# Patient Record
Sex: FEMALE | Race: OTHER | NOT HISPANIC OR LATINO
[De-identification: names, ages, dates, MRNs, and addresses within clinical notes are randomized per-mention and may not be internally consistent; named-entity substitution may affect disease eponyms.]

---

## 2017-01-13 ENCOUNTER — RX RENEWAL (OUTPATIENT)
Age: 42
End: 2017-01-13

## 2017-01-18 ENCOUNTER — TRANSCRIPTION ENCOUNTER (OUTPATIENT)
Age: 42
End: 2017-01-18

## 2017-01-19 ENCOUNTER — OTHER (OUTPATIENT)
Age: 42
End: 2017-01-19

## 2017-01-20 ENCOUNTER — OTHER (OUTPATIENT)
Age: 42
End: 2017-01-20

## 2017-01-23 ENCOUNTER — RX RENEWAL (OUTPATIENT)
Age: 42
End: 2017-01-23

## 2017-01-24 ENCOUNTER — APPOINTMENT (OUTPATIENT)
Dept: MAMMOGRAPHY | Facility: CLINIC | Age: 42
End: 2017-01-24

## 2017-02-21 ENCOUNTER — OUTPATIENT (OUTPATIENT)
Dept: OUTPATIENT SERVICES | Facility: HOSPITAL | Age: 42
LOS: 1 days | End: 2017-02-21
Payer: MEDICAID

## 2017-02-21 ENCOUNTER — APPOINTMENT (OUTPATIENT)
Dept: MAMMOGRAPHY | Facility: CLINIC | Age: 42
End: 2017-02-21

## 2017-02-21 DIAGNOSIS — Z00.8 ENCOUNTER FOR OTHER GENERAL EXAMINATION: ICD-10-CM

## 2017-02-21 PROCEDURE — 77067 SCR MAMMO BI INCL CAD: CPT

## 2017-02-21 PROCEDURE — 77063 BREAST TOMOSYNTHESIS BI: CPT

## 2017-03-27 ENCOUNTER — RX RENEWAL (OUTPATIENT)
Age: 42
End: 2017-03-27

## 2017-05-01 ENCOUNTER — TRANSCRIPTION ENCOUNTER (OUTPATIENT)
Age: 42
End: 2017-05-01

## 2017-05-15 ENCOUNTER — LABORATORY RESULT (OUTPATIENT)
Age: 42
End: 2017-05-15

## 2017-05-15 ENCOUNTER — APPOINTMENT (OUTPATIENT)
Dept: INTERNAL MEDICINE | Facility: CLINIC | Age: 42
End: 2017-05-15

## 2017-05-15 ENCOUNTER — NON-APPOINTMENT (OUTPATIENT)
Age: 42
End: 2017-05-15

## 2017-05-15 VITALS
WEIGHT: 290 LBS | SYSTOLIC BLOOD PRESSURE: 140 MMHG | BODY MASS INDEX: 51.38 KG/M2 | TEMPERATURE: 97.6 F | HEIGHT: 63 IN | OXYGEN SATURATION: 96 % | HEART RATE: 84 BPM | RESPIRATION RATE: 17 BRPM | DIASTOLIC BLOOD PRESSURE: 90 MMHG

## 2017-05-16 LAB
25(OH)D3 SERPL-MCNC: 22.1 NG/ML
ALBUMIN SERPL ELPH-MCNC: 4.2 G/DL
ALP BLD-CCNC: 77 U/L
ALT SERPL-CCNC: 27 U/L
ANION GAP SERPL CALC-SCNC: 15 MMOL/L
AST SERPL-CCNC: 22 U/L
BASOPHILS # BLD AUTO: 0.07 K/UL
BASOPHILS NFR BLD AUTO: 0.9 %
BILIRUB SERPL-MCNC: <0.2 MG/DL
BUN SERPL-MCNC: 13 MG/DL
CALCIUM SERPL-MCNC: 9.5 MG/DL
CHLORIDE SERPL-SCNC: 101 MMOL/L
CHOLEST SERPL-MCNC: 151 MG/DL
CHOLEST/HDLC SERPL: 2 RATIO
CO2 SERPL-SCNC: 26 MMOL/L
CREAT SERPL-MCNC: 0.58 MG/DL
EOSINOPHIL # BLD AUTO: 0.33 K/UL
EOSINOPHIL NFR BLD AUTO: 4.5 %
FERRITIN SERPL-MCNC: 6 NG/ML
GLUCOSE SERPL-MCNC: 90 MG/DL
HBA1C MFR BLD HPLC: 5.9 %
HCT VFR BLD CALC: 38.1 %
HDLC SERPL-MCNC: 87 MG/DL
HGB BLD-MCNC: 11.3 G/DL
HIV1+2 AB SPEC QL IA.RAPID: NONREACTIVE
IRON SATN MFR SERPL: 6 %
IRON SERPL-MCNC: 28 UG/DL
LDLC SERPL CALC-MCNC: 48 MG/DL
LYMPHOCYTES # BLD AUTO: 1.62 K/UL
LYMPHOCYTES NFR BLD AUTO: 22.3 %
MAN DIFF?: NORMAL
MCHC RBC-ENTMCNC: 21.8 PG
MCHC RBC-ENTMCNC: 29.7 GM/DL
MCV RBC AUTO: 73.4 FL
MONOCYTES # BLD AUTO: 0.2 K/UL
MONOCYTES NFR BLD AUTO: 2.7 %
NEUTROPHILS # BLD AUTO: 4.41 K/UL
NEUTROPHILS NFR BLD AUTO: 60.7 %
PLATELET # BLD AUTO: 337 K/UL
POTASSIUM SERPL-SCNC: 4.8 MMOL/L
PROT SERPL-MCNC: 7.6 G/DL
RBC # BLD: 5.19 M/UL
RBC # FLD: 16.8 %
SODIUM SERPL-SCNC: 142 MMOL/L
TIBC SERPL-MCNC: 459 UG/DL
TRIGL SERPL-MCNC: 80 MG/DL
TSH SERPL-ACNC: 3.54 UIU/ML
UIBC SERPL-MCNC: 431 UG/DL
WBC # FLD AUTO: 7.27 K/UL

## 2017-05-26 ENCOUNTER — RX RENEWAL (OUTPATIENT)
Age: 42
End: 2017-05-26

## 2017-05-30 ENCOUNTER — RX RENEWAL (OUTPATIENT)
Age: 42
End: 2017-05-30

## 2017-06-02 ENCOUNTER — RX RENEWAL (OUTPATIENT)
Age: 42
End: 2017-06-02

## 2017-06-07 ENCOUNTER — APPOINTMENT (OUTPATIENT)
Dept: PEDIATRIC ALLERGY IMMUNOLOGY | Facility: CLINIC | Age: 42
End: 2017-06-07

## 2017-06-07 ENCOUNTER — OUTPATIENT (OUTPATIENT)
Dept: OUTPATIENT SERVICES | Facility: HOSPITAL | Age: 42
LOS: 1 days | End: 2017-06-07
Payer: MEDICAID

## 2017-06-07 ENCOUNTER — NON-APPOINTMENT (OUTPATIENT)
Age: 42
End: 2017-06-07

## 2017-06-07 VITALS
DIASTOLIC BLOOD PRESSURE: 88 MMHG | HEART RATE: 81 BPM | BODY MASS INDEX: 50.5 KG/M2 | OXYGEN SATURATION: 92 % | SYSTOLIC BLOOD PRESSURE: 128 MMHG | HEIGHT: 63 IN | WEIGHT: 284.99 LBS

## 2017-06-07 PROCEDURE — 94010 BREATHING CAPACITY TEST: CPT

## 2017-06-07 PROCEDURE — G0463: CPT | Mod: 25

## 2017-06-08 ENCOUNTER — OTHER (OUTPATIENT)
Age: 42
End: 2017-06-08

## 2017-06-12 DIAGNOSIS — J45.901 UNSPECIFIED ASTHMA WITH (ACUTE) EXACERBATION: ICD-10-CM

## 2017-06-12 DIAGNOSIS — H10.13 ACUTE ATOPIC CONJUNCTIVITIS, BILATERAL: ICD-10-CM

## 2017-06-12 DIAGNOSIS — J30.89 OTHER ALLERGIC RHINITIS: ICD-10-CM

## 2017-09-06 ENCOUNTER — APPOINTMENT (OUTPATIENT)
Dept: PEDIATRIC ALLERGY IMMUNOLOGY | Facility: CLINIC | Age: 42
End: 2017-09-06

## 2017-10-19 ENCOUNTER — NON-APPOINTMENT (OUTPATIENT)
Age: 42
End: 2017-10-19

## 2017-10-19 ENCOUNTER — OUTPATIENT (OUTPATIENT)
Dept: OUTPATIENT SERVICES | Facility: HOSPITAL | Age: 42
LOS: 1 days | End: 2017-10-19
Payer: MEDICAID

## 2017-10-19 ENCOUNTER — APPOINTMENT (OUTPATIENT)
Dept: PEDIATRIC ALLERGY IMMUNOLOGY | Facility: CLINIC | Age: 42
End: 2017-10-19
Payer: MEDICAID

## 2017-10-19 VITALS
DIASTOLIC BLOOD PRESSURE: 89 MMHG | HEIGHT: 63 IN | HEART RATE: 75 BPM | WEIGHT: 293 LBS | SYSTOLIC BLOOD PRESSURE: 128 MMHG | BODY MASS INDEX: 51.91 KG/M2 | OXYGEN SATURATION: 95 %

## 2017-10-19 DIAGNOSIS — R05 COUGH: ICD-10-CM

## 2017-10-19 PROCEDURE — G0463: CPT | Mod: 25

## 2017-10-19 PROCEDURE — 94010 BREATHING CAPACITY TEST: CPT

## 2017-10-19 PROCEDURE — 95004 PERQ TESTS W/ALRGNC XTRCS: CPT

## 2017-10-19 PROCEDURE — 99214 OFFICE O/P EST MOD 30 MIN: CPT

## 2017-10-19 RX ORDER — BECLOMETHASONE DIPROPIONATE 40 UG/1
40 AEROSOL, METERED RESPIRATORY (INHALATION) TWICE DAILY
Qty: 1 | Refills: 3 | Status: DISCONTINUED | COMMUNITY
Start: 2017-06-07 | End: 2017-10-19

## 2017-10-24 DIAGNOSIS — Z01.89 ENCOUNTER FOR OTHER SPECIFIED SPECIAL EXAMINATIONS: ICD-10-CM

## 2017-10-24 DIAGNOSIS — J45.31 MILD PERSISTENT ASTHMA WITH (ACUTE) EXACERBATION: ICD-10-CM

## 2017-10-24 DIAGNOSIS — J30.89 OTHER ALLERGIC RHINITIS: ICD-10-CM

## 2017-10-24 DIAGNOSIS — R05 COUGH: ICD-10-CM

## 2017-10-24 DIAGNOSIS — H10.13 ACUTE ATOPIC CONJUNCTIVITIS, BILATERAL: ICD-10-CM

## 2017-10-24 DIAGNOSIS — J30.9 ALLERGIC RHINITIS, UNSPECIFIED: ICD-10-CM

## 2017-10-24 DIAGNOSIS — J30.1 ALLERGIC RHINITIS DUE TO POLLEN: ICD-10-CM

## 2017-10-29 ENCOUNTER — MOBILE ON CALL (OUTPATIENT)
Age: 42
End: 2017-10-29

## 2017-10-29 ENCOUNTER — OTHER (OUTPATIENT)
Age: 42
End: 2017-10-29

## 2017-10-30 ENCOUNTER — OTHER (OUTPATIENT)
Age: 42
End: 2017-10-30

## 2017-11-20 ENCOUNTER — RX RENEWAL (OUTPATIENT)
Age: 42
End: 2017-11-20

## 2017-11-27 ENCOUNTER — RX RENEWAL (OUTPATIENT)
Age: 42
End: 2017-11-27

## 2018-01-11 ENCOUNTER — OTHER (OUTPATIENT)
Age: 43
End: 2018-01-11

## 2018-01-12 ENCOUNTER — MOBILE ON CALL (OUTPATIENT)
Age: 43
End: 2018-01-12

## 2018-03-20 ENCOUNTER — RX RENEWAL (OUTPATIENT)
Age: 43
End: 2018-03-20

## 2018-03-20 ENCOUNTER — OTHER (OUTPATIENT)
Age: 43
End: 2018-03-20

## 2018-03-21 ENCOUNTER — APPOINTMENT (OUTPATIENT)
Dept: PEDIATRIC ALLERGY IMMUNOLOGY | Facility: CLINIC | Age: 43
End: 2018-03-21

## 2018-03-26 ENCOUNTER — FORM ENCOUNTER (OUTPATIENT)
Age: 43
End: 2018-03-26

## 2018-03-27 ENCOUNTER — APPOINTMENT (OUTPATIENT)
Dept: MAMMOGRAPHY | Facility: CLINIC | Age: 43
End: 2018-03-27
Payer: MEDICAID

## 2018-03-27 ENCOUNTER — OUTPATIENT (OUTPATIENT)
Dept: OUTPATIENT SERVICES | Facility: HOSPITAL | Age: 43
LOS: 1 days | End: 2018-03-27
Payer: MEDICAID

## 2018-03-27 DIAGNOSIS — Z00.8 ENCOUNTER FOR OTHER GENERAL EXAMINATION: ICD-10-CM

## 2018-03-27 DIAGNOSIS — Z00.00 ENCOUNTER FOR GENERAL ADULT MEDICAL EXAMINATION WITHOUT ABNORMAL FINDINGS: ICD-10-CM

## 2018-03-27 PROCEDURE — 77067 SCR MAMMO BI INCL CAD: CPT

## 2018-03-27 PROCEDURE — 77067 SCR MAMMO BI INCL CAD: CPT | Mod: 26

## 2018-03-27 PROCEDURE — 77063 BREAST TOMOSYNTHESIS BI: CPT | Mod: 26

## 2018-03-27 PROCEDURE — 77063 BREAST TOMOSYNTHESIS BI: CPT

## 2018-03-29 ENCOUNTER — APPOINTMENT (OUTPATIENT)
Dept: PEDIATRIC ALLERGY IMMUNOLOGY | Facility: CLINIC | Age: 43
End: 2018-03-29
Payer: MEDICAID

## 2018-03-29 ENCOUNTER — OUTPATIENT (OUTPATIENT)
Dept: OUTPATIENT SERVICES | Facility: HOSPITAL | Age: 43
LOS: 1 days | End: 2018-03-29
Payer: MEDICAID

## 2018-03-29 ENCOUNTER — NON-APPOINTMENT (OUTPATIENT)
Age: 43
End: 2018-03-29

## 2018-03-29 VITALS
DIASTOLIC BLOOD PRESSURE: 86 MMHG | SYSTOLIC BLOOD PRESSURE: 142 MMHG | BODY MASS INDEX: 51.91 KG/M2 | HEART RATE: 87 BPM | OXYGEN SATURATION: 88 % | WEIGHT: 293 LBS | HEIGHT: 63 IN

## 2018-03-29 PROCEDURE — 99214 OFFICE O/P EST MOD 30 MIN: CPT

## 2018-03-29 PROCEDURE — G0463: CPT | Mod: 25

## 2018-03-29 PROCEDURE — 94010 BREATHING CAPACITY TEST: CPT

## 2018-04-03 DIAGNOSIS — J45.909 UNSPECIFIED ASTHMA, UNCOMPLICATED: ICD-10-CM

## 2018-04-03 DIAGNOSIS — J30.89 OTHER ALLERGIC RHINITIS: ICD-10-CM

## 2018-04-03 DIAGNOSIS — J30.1 ALLERGIC RHINITIS DUE TO POLLEN: ICD-10-CM

## 2018-04-11 ENCOUNTER — APPOINTMENT (OUTPATIENT)
Dept: PEDIATRIC ALLERGY IMMUNOLOGY | Facility: CLINIC | Age: 43
End: 2018-04-11

## 2018-05-01 ENCOUNTER — LABORATORY RESULT (OUTPATIENT)
Age: 43
End: 2018-05-01

## 2018-05-01 ENCOUNTER — OUTPATIENT (OUTPATIENT)
Dept: OUTPATIENT SERVICES | Facility: HOSPITAL | Age: 43
LOS: 1 days | End: 2018-05-01
Payer: MEDICAID

## 2018-05-01 ENCOUNTER — APPOINTMENT (OUTPATIENT)
Dept: OBGYN | Facility: CLINIC | Age: 43
End: 2018-05-01
Payer: MEDICAID

## 2018-05-01 VITALS — SYSTOLIC BLOOD PRESSURE: 180 MMHG | BODY MASS INDEX: 52.79 KG/M2 | DIASTOLIC BLOOD PRESSURE: 104 MMHG | WEIGHT: 293 LBS

## 2018-05-01 DIAGNOSIS — Z80.3 FAMILY HISTORY OF MALIGNANT NEOPLASM OF BREAST: ICD-10-CM

## 2018-05-01 DIAGNOSIS — N76.0 ACUTE VAGINITIS: ICD-10-CM

## 2018-05-01 PROCEDURE — 99386 PREV VISIT NEW AGE 40-64: CPT | Mod: NC

## 2018-05-01 PROCEDURE — G0463: CPT

## 2018-05-01 PROCEDURE — 87624 HPV HI-RISK TYP POOLED RSLT: CPT

## 2018-05-02 ENCOUNTER — NON-APPOINTMENT (OUTPATIENT)
Age: 43
End: 2018-05-02

## 2018-05-02 ENCOUNTER — APPOINTMENT (OUTPATIENT)
Dept: INTERNAL MEDICINE | Facility: CLINIC | Age: 43
End: 2018-05-02
Payer: MEDICAID

## 2018-05-02 VITALS
RESPIRATION RATE: 17 BRPM | HEART RATE: 88 BPM | DIASTOLIC BLOOD PRESSURE: 84 MMHG | HEIGHT: 63 IN | TEMPERATURE: 98.2 F | OXYGEN SATURATION: 97 % | SYSTOLIC BLOOD PRESSURE: 188 MMHG | WEIGHT: 293 LBS | BODY MASS INDEX: 51.91 KG/M2

## 2018-05-02 DIAGNOSIS — J30.1 ALLERGIC RHINITIS DUE TO POLLEN: ICD-10-CM

## 2018-05-02 LAB
C TRACH RRNA SPEC QL NAA+PROBE: SIGNIFICANT CHANGE UP
HPV HIGH+LOW RISK DNA PNL CVX: SIGNIFICANT CHANGE UP
N GONORRHOEA RRNA SPEC QL NAA+PROBE: SIGNIFICANT CHANGE UP
SPECIMEN SOURCE: SIGNIFICANT CHANGE UP

## 2018-05-02 PROCEDURE — 93000 ELECTROCARDIOGRAM COMPLETE: CPT

## 2018-05-02 PROCEDURE — 99214 OFFICE O/P EST MOD 30 MIN: CPT

## 2018-05-03 LAB
ALBUMIN SERPL ELPH-MCNC: 4.1 G/DL
ALP BLD-CCNC: 71 U/L
ALT SERPL-CCNC: 26 U/L
ANION GAP SERPL CALC-SCNC: 10 MMOL/L
AST SERPL-CCNC: 25 U/L
BILIRUB SERPL-MCNC: 0.2 MG/DL
BUN SERPL-MCNC: 12 MG/DL
CALCIUM SERPL-MCNC: 9.5 MG/DL
CHLORIDE SERPL-SCNC: 100 MMOL/L
CO2 SERPL-SCNC: 28 MMOL/L
CREAT SERPL-MCNC: 0.44 MG/DL
POTASSIUM SERPL-SCNC: 4.2 MMOL/L
PROT SERPL-MCNC: 7.5 G/DL
SODIUM SERPL-SCNC: 138 MMOL/L

## 2018-05-05 LAB — CYTOLOGY SPEC DOC CYTO: SIGNIFICANT CHANGE UP

## 2018-05-08 DIAGNOSIS — I10 ESSENTIAL (PRIMARY) HYPERTENSION: ICD-10-CM

## 2018-05-08 DIAGNOSIS — Z01.419 ENCOUNTER FOR GYNECOLOGICAL EXAMINATION (GENERAL) (ROUTINE) WITHOUT ABNORMAL FINDINGS: ICD-10-CM

## 2018-05-15 ENCOUNTER — RX RENEWAL (OUTPATIENT)
Age: 43
End: 2018-05-15

## 2018-05-16 ENCOUNTER — APPOINTMENT (OUTPATIENT)
Dept: INTERNAL MEDICINE | Facility: CLINIC | Age: 43
End: 2018-05-16
Payer: MEDICAID

## 2018-05-16 VITALS
TEMPERATURE: 98.4 F | HEART RATE: 92 BPM | WEIGHT: 289 LBS | OXYGEN SATURATION: 97 % | RESPIRATION RATE: 17 BRPM | HEIGHT: 63 IN | SYSTOLIC BLOOD PRESSURE: 143 MMHG | BODY MASS INDEX: 51.21 KG/M2 | DIASTOLIC BLOOD PRESSURE: 84 MMHG

## 2018-05-16 DIAGNOSIS — J45.31 MILD PERSISTENT ASTHMA WITH (ACUTE) EXACERBATION: ICD-10-CM

## 2018-05-16 DIAGNOSIS — M79.673 PAIN IN UNSPECIFIED FOOT: ICD-10-CM

## 2018-05-16 DIAGNOSIS — Z86.2 PERSONAL HISTORY OF DISEASES OF THE BLOOD AND BLOOD-FORMING ORGANS AND CERTAIN DISORDERS INVOLVING THE IMMUNE MECHANISM: ICD-10-CM

## 2018-05-16 DIAGNOSIS — H10.13 ACUTE ATOPIC CONJUNCTIVITIS, BILATERAL: ICD-10-CM

## 2018-05-16 LAB
25(OH)D3 SERPL-MCNC: 34.3 NG/ML
ALBUMIN SERPL ELPH-MCNC: 4.4 G/DL
ALP BLD-CCNC: 75 U/L
ALT SERPL-CCNC: 31 U/L
ANION GAP SERPL CALC-SCNC: 16 MMOL/L
AST SERPL-CCNC: 25 U/L
BASOPHILS # BLD AUTO: 0.03 K/UL
BASOPHILS NFR BLD AUTO: 0.4 %
BILIRUB SERPL-MCNC: <0.2 MG/DL
BUN SERPL-MCNC: 15 MG/DL
CALCIUM SERPL-MCNC: 9.9 MG/DL
CHLORIDE SERPL-SCNC: 99 MMOL/L
CHOLEST SERPL-MCNC: 157 MG/DL
CHOLEST/HDLC SERPL: 1.6 RATIO
CO2 SERPL-SCNC: 26 MMOL/L
CREAT SERPL-MCNC: 0.69 MG/DL
EOSINOPHIL # BLD AUTO: 0.18 K/UL
EOSINOPHIL NFR BLD AUTO: 2.2 %
FERRITIN SERPL-MCNC: 10 NG/ML
GLUCOSE SERPL-MCNC: 88 MG/DL
HBA1C MFR BLD HPLC: 5.8 %
HCT VFR BLD CALC: 40.8 %
HDLC SERPL-MCNC: 99 MG/DL
HGB BLD-MCNC: 11.7 G/DL
IMM GRANULOCYTES NFR BLD AUTO: 0.5 %
IRON SATN MFR SERPL: 6 %
IRON SERPL-MCNC: 31 UG/DL
LDLC SERPL CALC-MCNC: 46 MG/DL
LYMPHOCYTES # BLD AUTO: 2.57 K/UL
LYMPHOCYTES NFR BLD AUTO: 31.3 %
MAN DIFF?: NORMAL
MCHC RBC-ENTMCNC: 22 PG
MCHC RBC-ENTMCNC: 28.7 GM/DL
MCV RBC AUTO: 76.5 FL
MONOCYTES # BLD AUTO: 0.48 K/UL
MONOCYTES NFR BLD AUTO: 5.8 %
NEUTROPHILS # BLD AUTO: 4.92 K/UL
NEUTROPHILS NFR BLD AUTO: 59.8 %
PLATELET # BLD AUTO: 358 K/UL
POTASSIUM SERPL-SCNC: 4.8 MMOL/L
PROT SERPL-MCNC: 8.2 G/DL
RBC # BLD: 5.33 M/UL
RBC # FLD: 18 %
SODIUM SERPL-SCNC: 141 MMOL/L
T4 FREE SERPL-MCNC: 1.4 NG/DL
TIBC SERPL-MCNC: 495 UG/DL
TRIGL SERPL-MCNC: 59 MG/DL
TSH SERPL-ACNC: 3.66 UIU/ML
UIBC SERPL-MCNC: 464 UG/DL
WBC # FLD AUTO: 8.22 K/UL

## 2018-05-16 PROCEDURE — 99396 PREV VISIT EST AGE 40-64: CPT

## 2018-05-16 RX ORDER — AZELASTINE HYDROCHLORIDE 0.5 MG/ML
0.05 SOLUTION/ DROPS OPHTHALMIC TWICE DAILY
Qty: 1 | Refills: 1 | Status: DISCONTINUED | COMMUNITY
Start: 2017-10-23 | End: 2018-05-16

## 2018-05-16 RX ORDER — INHALER,ASSIST DEVICE,LG MASK
SPACER (EA) MISCELLANEOUS
Qty: 1 | Refills: 1 | Status: DISCONTINUED | COMMUNITY
Start: 2018-03-29 | End: 2018-05-16

## 2018-05-23 ENCOUNTER — RX RENEWAL (OUTPATIENT)
Age: 43
End: 2018-05-23

## 2018-06-13 ENCOUNTER — EMERGENCY (EMERGENCY)
Facility: HOSPITAL | Age: 43
LOS: 1 days | Discharge: HOME | End: 2018-06-13
Attending: EMERGENCY MEDICINE | Admitting: EMERGENCY MEDICINE
Payer: MEDICAID

## 2018-06-13 VITALS
SYSTOLIC BLOOD PRESSURE: 129 MMHG | RESPIRATION RATE: 20 BRPM | HEART RATE: 97 BPM | TEMPERATURE: 99 F | DIASTOLIC BLOOD PRESSURE: 89 MMHG | OXYGEN SATURATION: 97 %

## 2018-06-13 VITALS
SYSTOLIC BLOOD PRESSURE: 152 MMHG | OXYGEN SATURATION: 100 % | RESPIRATION RATE: 18 BRPM | DIASTOLIC BLOOD PRESSURE: 98 MMHG | TEMPERATURE: 100 F | HEART RATE: 86 BPM

## 2018-06-13 LAB
APPEARANCE UR: CLEAR — SIGNIFICANT CHANGE UP
BACTERIA # UR AUTO: HIGH
BILIRUB UR-MCNC: NEGATIVE — SIGNIFICANT CHANGE UP
BLOOD UR QL VISUAL: HIGH
COLOR SPEC: YELLOW — SIGNIFICANT CHANGE UP
GLUCOSE UR-MCNC: NEGATIVE — SIGNIFICANT CHANGE UP
KETONES UR-MCNC: NEGATIVE — SIGNIFICANT CHANGE UP
LEUKOCYTE ESTERASE UR-ACNC: HIGH
MUCOUS THREADS # UR AUTO: SIGNIFICANT CHANGE UP
NITRITE UR-MCNC: NEGATIVE — SIGNIFICANT CHANGE UP
PH UR: 6.5 — SIGNIFICANT CHANGE UP (ref 4.6–8)
PROT UR-MCNC: 20 MG/DL — SIGNIFICANT CHANGE UP
RBC CASTS # UR COMP ASSIST: >50 — HIGH (ref 0–?)
SP GR SPEC: 1.02 — SIGNIFICANT CHANGE UP (ref 1–1.04)
SQUAMOUS # UR AUTO: SIGNIFICANT CHANGE UP
UROBILINOGEN FLD QL: NORMAL MG/DL — SIGNIFICANT CHANGE UP
WBC UR QL: SIGNIFICANT CHANGE UP (ref 0–?)

## 2018-06-13 PROCEDURE — 99283 EMERGENCY DEPT VISIT LOW MDM: CPT

## 2018-06-13 PROCEDURE — 76830 TRANSVAGINAL US NON-OB: CPT | Mod: 26

## 2018-06-13 PROCEDURE — 99285 EMERGENCY DEPT VISIT HI MDM: CPT

## 2018-06-13 RX ORDER — CEPHALEXIN 500 MG
500 CAPSULE ORAL ONCE
Qty: 0 | Refills: 0 | Status: COMPLETED | OUTPATIENT
Start: 2018-06-13 | End: 2018-06-13

## 2018-06-13 RX ADMIN — Medication 500 MILLIGRAM(S): at 23:34

## 2018-06-13 NOTE — ED ADULT TRIAGE NOTE - CHIEF COMPLAINT QUOTE
pt reports more painful periods than normal. reports no change in bleeding however today experiencing LLQ and LUQ abd pain tender to palpation. Denies current nausea or vomiting but states when pain comes in waves she is nauseated. reports painful urination.

## 2018-06-13 NOTE — ED PROVIDER NOTE - MEDICAL DECISION MAKING DETAILS
43 y/o female with h/o htn currently having menses with c/o abd cramping llq.  Ov cyst vs rupture vs doubtful torsion as only mild sx at this time vs uti vs fibroids vs pregnancy.  Obtain tvus, ua, ucx, pt declines pain or nausea meds at this time. 41 y/o female with h/o htn currently having menses with c/o abd cramping llq.  Ov cyst vs rupture vs torsion with spontaneous detorsion vs uti vs fibroids vs pregnancy.  Obtain tvus, ua, ucx, pt declines pain or nausea meds at this time.

## 2018-06-13 NOTE — ED PROVIDER NOTE - CARE PLAN
Principal Discharge DX:	Pelvic pain in female Principal Discharge DX:	Cyst of left ovary  Secondary Diagnosis:	UTI (urinary tract infection) Principal Discharge DX:	Cyst of left ovary  Assessment and plan of treatment:	Follow up with your Primary Medical Doctor / OBGYN within 2-3days. If results or reports were given to you, show copies of your reports given to you. Take all of your medications as previously prescribed. If worsening pain, nausea, vomiting, fevers, return to the ED immediately.  Take Keflex 2x/day for 7 days.  Secondary Diagnosis:	UTI (urinary tract infection)

## 2018-06-13 NOTE — ED PROVIDER NOTE - OBJECTIVE STATEMENT
41 y/o female h/o htn with c/o left lower abd cramping.  Pt states that she is currently menstruating, day 4.  Typically has low back pain but during this episode of menstruation developed lower abd cramping, primarily at the llq.  She had severe cramping today that lasted about an hour a/w nausea, now mild pain.  Denies f/c, ha, neck stiffness, cp, sob, cough, v/d, dysuria, rash. 41 y/o female h/o htn with c/o left lower abd cramping.  Pt states that she is currently menstruating, day 4.  Typically has low back pain but during this episode of menstruation developed lower abd cramping, primarily at the llq.  She had severe cramping today that lasted about an hour a/w nausea, now mild pain.  Sensation of "fullness" when urinating.  Denies f/c, ha, neck stiffness, cp, sob, cough, v/d, rash. 43 y/o female h/o htn with c/o left lower abd cramping.  Pt states that she is currently menstruating, day 4.  Typically has low back pain but during this episode of menstruation developed lower abd cramping, primarily at the llq.  She had severe cramping today that lasted about an hour, sudden onset at llq, pain 10/10,  a/w nausea, had sudden resolution and now only mild discomfort.  Sensation of "fullness" when urinating.  Denies f/c, ha, neck stiffness, cp, sob, cough, v/d, rash.

## 2018-06-13 NOTE — ED PROVIDER NOTE - PLAN OF CARE
Follow up with your Primary Medical Doctor / OBGYN within 2-3days. If results or reports were given to you, show copies of your reports given to you. Take all of your medications as previously prescribed. If worsening pain, nausea, vomiting, fevers, return to the ED immediately.  Take Keflex 2x/day for 7 days.

## 2018-06-13 NOTE — ED PROVIDER NOTE - PROGRESS NOTE DETAILS
MD CHO:  Pt reports feeling better.  Her ob/gyn is Dr. Aida Rubin.  U/S report reads as complex left ov cyst.  Given hx, concern for intermittent ov torsion.  Will consult ob, continue to monitor. CHONG Hand: Pt signed out to me by Dr. Ordonez. Pt evaluated by ob-gyn, no acute intervention, given strict return precautions and follow up with obgyn. Pt feeling much improved. Will treat for UTI.

## 2018-06-14 RX ORDER — CEPHALEXIN 500 MG
1 CAPSULE ORAL
Qty: 14 | Refills: 0
Start: 2018-06-14 | End: 2018-06-20

## 2018-06-14 NOTE — CONSULT NOTE ADULT - SUBJECTIVE AND OBJECTIVE BOX
PGY4 Gynecology Consult Note    42y yo  (LMP 6/10/18) presenting with strong left lower quadrant pelvic cramping at 1pm.  Pain up to 8/10, lasting one hour, resolved spontaneously.  Mild abdominal cramping since, up to 210.  Patient with known history of ovarian cysts states an OB told her first two years prior.  Notes vaginal bleeding, volume consistent with her normal menses.  No new sexual partners, mutually monogamous with , no abnormal discharge, no fevers/chills.  Normal bowel movements, no dysuria.  Tolerating PO without nausea emesis.  Last ate 4pm yesterday.  Patient states she receives care with Aida Rubin (PA at [place]), however not documented to be seen in EMR.    OB/GYN HISTORY: 4x  full term uncomplicated.  1x eTOP at age 28, 8wks gestational, by D&C.  PAST MEDICAL & SURGICAL HISTORY:  Hypertension  Asthma  D&C    MEDICATIONS  (STANDING):  Amlodipine  HCTZ    Allergies  No Known Allergies      Exam  Vital Signs Last 24 Hrs  T(C): 37 (2018 22:25), Max: 37.5 (2018 19:05)  T(F): 98.6 (2018 22:25), Max: 99.5 (2018 19:05)  HR: 97 (2018 22:25) (86 - 97)  BP: 129/89 (2018 22:25) (129/89 - 152/98)  RR: 20 (2018 22:25) (18 - 20)  SpO2: 97% (2018 22:25) (97% - 100%)  Gen: NAD, A&O  Abd: soft, nontender, nondistended  Pelvic: Mobile uterus approximately 12 wks gestational age, anteverted.  Cervix closed.  No abnormal discharge or blood on glove.  Left adnexal fullness, no focal tenderness, no adnexal tenderness, no CMT. Right adnexa wnl.  Ext: nontender bilaterally    LABS:  bHCG: negative    Urinalysis Basic - ( 2018 22:34 )    Color: YELLOW / Appearance: CLEAR / S.018 / pH: 6.5  Gluc: NEGATIVE / Ketone: NEGATIVE  / Bili: NEGATIVE / Urobili: NORMAL mg/dL   Blood: LARGE / Protein: 20 mg/dL / Nitrite: NEGATIVE   Leuk Esterase: MODERATE / RBC: >50 / WBC 10-25   Sq Epi: OCC / Non Sq Epi: x / Bacteria: MANY        EXAM:  US TRANSVAGINAL        PROCEDURE DATE:  2018       INTERPRETATION:  CLINICAL INFORMATION: Left-sided pelvic pain.  LMP: 6/10/2018    COMPARISON: None    TECHNIQUE:   Transabdominal and Endovaginal pelvic sonogram. Color and Spectral   Doppler was performed.    FINDINGS:    Uterus: 13.3 x 8.5 x 9.3 cm. I uterus with multiple fibroids.  A fibroid   in the anterior uterus measures 2.8 x 2.7 x 3.5 cm.  Myometrial fibroid   in the posterior uterus measures 4 x 3.9 x 3.9 cm.    Endometrium: Poorly visualized.    Right ovary: Not visualized on transabdominal or transvaginal imaging.    Left ovary: 6.9 x 4.5 x 6.9 cm, inclusive of a complex cyst measuring 6.1   x 4.3 x 5.8 cm.  A thin rim of peripheral ovarian tissue demonstrates   vascular flow on color Doppler imaging.    Fluid: None.    IMPRESSION:  Enlarged left ovary containing a 6.1 cm complex cyst.  A thin rim of   peripheral left ovarian tissue demonstrates vascular flow on color   Doppler imaging however this does not exclude the possibility of torsion.    Partial torsion and/or torsion detorsion should be excluded clinically.    If the patient is managed conservatively a follow-up pelvic ultrasound   can be obtained in six weeks to ensure resolution.    Right ovary not visualized on transabdominal or transvaginal imaging.    Heterogeneous uterus with multiple fibroids.  The endometrium is poorly   visualized on this examination.          LUKASZ PINO M.D., RADIOLOGY RESIDENT  This document has been electronically signed.  JUSTIN LORA M.D.,ATTENDING RADIOLOGIST  This document has been electronically signed. 2018 10:57PM

## 2018-06-14 NOTE — CONSULT NOTE ADULT - ASSESSMENT
42y yo  (LMP 6/10/18) presenting with strong left lower quadrant pelvic cramping, resolved, and noted to have 6.1 cm ovarian cyst.  Pain resolved spontaneously, no pain meds given in ED, no signs of torsion on physical exam.  Low suspicion for ovarian torsion, no signs of ruptured cyst, no acute gyn intervention indicated.  - Torsion precautions given.  If pain returns, for severe adnexal or abdominal pain patient to return to ED.  - Discharge home, to follow up in clinic as outpatient    Patient seen and examined with Dr Carolee Lemus PGY3  [id number]

## 2018-06-15 LAB
BACTERIA UR CULT: SIGNIFICANT CHANGE UP
SPECIMEN SOURCE: SIGNIFICANT CHANGE UP

## 2018-06-18 ENCOUNTER — OUTPATIENT (OUTPATIENT)
Dept: OUTPATIENT SERVICES | Facility: HOSPITAL | Age: 43
LOS: 1 days | End: 2018-06-18
Payer: MEDICAID

## 2018-06-18 ENCOUNTER — APPOINTMENT (OUTPATIENT)
Dept: OBGYN | Facility: CLINIC | Age: 43
End: 2018-06-18
Payer: MEDICAID

## 2018-06-18 VITALS
DIASTOLIC BLOOD PRESSURE: 90 MMHG | WEIGHT: 286 LBS | BODY MASS INDEX: 50.68 KG/M2 | HEIGHT: 63 IN | SYSTOLIC BLOOD PRESSURE: 138 MMHG

## 2018-06-18 DIAGNOSIS — N76.0 ACUTE VAGINITIS: ICD-10-CM

## 2018-06-18 PROCEDURE — 99214 OFFICE O/P EST MOD 30 MIN: CPT | Mod: NC

## 2018-06-18 PROCEDURE — G0463: CPT

## 2018-06-26 DIAGNOSIS — N83.202 UNSPECIFIED OVARIAN CYST, LEFT SIDE: ICD-10-CM

## 2018-06-26 DIAGNOSIS — N39.0 URINARY TRACT INFECTION, SITE NOT SPECIFIED: ICD-10-CM

## 2018-06-26 DIAGNOSIS — D25.9 LEIOMYOMA OF UTERUS, UNSPECIFIED: ICD-10-CM

## 2018-07-10 ENCOUNTER — OUTPATIENT (OUTPATIENT)
Dept: OUTPATIENT SERVICES | Facility: HOSPITAL | Age: 43
LOS: 1 days | End: 2018-07-10
Payer: MEDICAID

## 2018-07-10 ENCOUNTER — APPOINTMENT (OUTPATIENT)
Dept: OBGYN | Facility: CLINIC | Age: 43
End: 2018-07-10
Payer: MEDICAID

## 2018-07-10 VITALS
SYSTOLIC BLOOD PRESSURE: 130 MMHG | HEIGHT: 63 IN | WEIGHT: 284 LBS | BODY MASS INDEX: 50.32 KG/M2 | DIASTOLIC BLOOD PRESSURE: 90 MMHG

## 2018-07-10 DIAGNOSIS — N76.0 ACUTE VAGINITIS: ICD-10-CM

## 2018-07-10 PROCEDURE — 81003 URINALYSIS AUTO W/O SCOPE: CPT

## 2018-07-10 PROCEDURE — 81003 URINALYSIS AUTO W/O SCOPE: CPT | Mod: NC,QW

## 2018-07-10 PROCEDURE — 99213 OFFICE O/P EST LOW 20 MIN: CPT | Mod: NC

## 2018-07-10 PROCEDURE — G0463: CPT

## 2018-07-12 ENCOUNTER — FORM ENCOUNTER (OUTPATIENT)
Age: 43
End: 2018-07-12

## 2018-07-13 ENCOUNTER — APPOINTMENT (OUTPATIENT)
Dept: ULTRASOUND IMAGING | Facility: CLINIC | Age: 43
End: 2018-07-13
Payer: MEDICAID

## 2018-07-13 ENCOUNTER — OUTPATIENT (OUTPATIENT)
Dept: OUTPATIENT SERVICES | Facility: HOSPITAL | Age: 43
LOS: 1 days | End: 2018-07-13
Payer: MEDICAID

## 2018-07-13 DIAGNOSIS — N39.0 URINARY TRACT INFECTION, SITE NOT SPECIFIED: ICD-10-CM

## 2018-07-13 DIAGNOSIS — Z00.8 ENCOUNTER FOR OTHER GENERAL EXAMINATION: ICD-10-CM

## 2018-07-13 DIAGNOSIS — N83.202 UNSPECIFIED OVARIAN CYST, LEFT SIDE: ICD-10-CM

## 2018-07-13 PROCEDURE — 76856 US EXAM PELVIC COMPLETE: CPT | Mod: 26

## 2018-07-13 PROCEDURE — 76856 US EXAM PELVIC COMPLETE: CPT

## 2018-07-18 ENCOUNTER — OUTPATIENT (OUTPATIENT)
Dept: OUTPATIENT SERVICES | Facility: HOSPITAL | Age: 43
LOS: 1 days | End: 2018-07-18
Payer: MEDICAID

## 2018-07-18 ENCOUNTER — APPOINTMENT (OUTPATIENT)
Dept: OBGYN | Facility: CLINIC | Age: 43
End: 2018-07-18
Payer: MEDICAID

## 2018-07-18 ENCOUNTER — LABORATORY RESULT (OUTPATIENT)
Age: 43
End: 2018-07-18

## 2018-07-18 VITALS — SYSTOLIC BLOOD PRESSURE: 142 MMHG | BODY MASS INDEX: 51.73 KG/M2 | WEIGHT: 292 LBS | DIASTOLIC BLOOD PRESSURE: 90 MMHG

## 2018-07-18 DIAGNOSIS — N76.0 ACUTE VAGINITIS: ICD-10-CM

## 2018-07-18 PROCEDURE — 36415 COLL VENOUS BLD VENIPUNCTURE: CPT

## 2018-07-18 PROCEDURE — 99213 OFFICE O/P EST LOW 20 MIN: CPT | Mod: NC

## 2018-07-18 PROCEDURE — 36415 COLL VENOUS BLD VENIPUNCTURE: CPT | Mod: NC

## 2018-07-18 PROCEDURE — 86304 IMMUNOASSAY TUMOR CA 125: CPT

## 2018-07-18 PROCEDURE — G0463: CPT

## 2018-07-19 LAB — CANCER AG125 SERPL-ACNC: 124 U/ML — HIGH

## 2018-07-24 ENCOUNTER — APPOINTMENT (OUTPATIENT)
Dept: CT IMAGING | Facility: CLINIC | Age: 43
End: 2018-07-24

## 2018-07-25 ENCOUNTER — FORM ENCOUNTER (OUTPATIENT)
Age: 43
End: 2018-07-25

## 2018-07-26 ENCOUNTER — OUTPATIENT (OUTPATIENT)
Dept: OUTPATIENT SERVICES | Facility: HOSPITAL | Age: 43
LOS: 1 days | End: 2018-07-26
Payer: MEDICAID

## 2018-07-26 ENCOUNTER — APPOINTMENT (OUTPATIENT)
Dept: MRI IMAGING | Facility: CLINIC | Age: 43
End: 2018-07-26

## 2018-07-26 DIAGNOSIS — Z00.8 ENCOUNTER FOR OTHER GENERAL EXAMINATION: ICD-10-CM

## 2018-07-26 PROCEDURE — 82565 ASSAY OF CREATININE: CPT

## 2018-07-28 DIAGNOSIS — E66.9 OBESITY, UNSPECIFIED: ICD-10-CM

## 2018-07-28 DIAGNOSIS — D25.9 LEIOMYOMA OF UTERUS, UNSPECIFIED: ICD-10-CM

## 2018-07-28 DIAGNOSIS — I10 ESSENTIAL (PRIMARY) HYPERTENSION: ICD-10-CM

## 2018-07-28 DIAGNOSIS — N83.202 UNSPECIFIED OVARIAN CYST, LEFT SIDE: ICD-10-CM

## 2018-08-02 ENCOUNTER — APPOINTMENT (OUTPATIENT)
Dept: OBGYN | Facility: CLINIC | Age: 43
End: 2018-08-02
Payer: MEDICAID

## 2018-08-02 ENCOUNTER — OUTPATIENT (OUTPATIENT)
Dept: OUTPATIENT SERVICES | Facility: HOSPITAL | Age: 43
LOS: 1 days | End: 2018-08-02
Payer: MEDICAID

## 2018-08-02 VITALS
SYSTOLIC BLOOD PRESSURE: 140 MMHG | HEIGHT: 63 IN | DIASTOLIC BLOOD PRESSURE: 90 MMHG | WEIGHT: 292 LBS | BODY MASS INDEX: 51.74 KG/M2

## 2018-08-02 DIAGNOSIS — N76.0 ACUTE VAGINITIS: ICD-10-CM

## 2018-08-02 DIAGNOSIS — N83.202 UNSPECIFIED OVARIAN CYST, LEFT SIDE: ICD-10-CM

## 2018-08-02 DIAGNOSIS — D25.9 LEIOMYOMA OF UTERUS, UNSPECIFIED: ICD-10-CM

## 2018-08-02 PROCEDURE — 99213 OFFICE O/P EST LOW 20 MIN: CPT | Mod: GE

## 2018-08-02 PROCEDURE — G0463: CPT

## 2018-08-29 ENCOUNTER — OUTPATIENT (OUTPATIENT)
Dept: OUTPATIENT SERVICES | Facility: HOSPITAL | Age: 43
LOS: 1 days | End: 2018-08-29
Payer: MEDICAID

## 2018-08-29 VITALS
TEMPERATURE: 98 F | SYSTOLIC BLOOD PRESSURE: 127 MMHG | WEIGHT: 287.92 LBS | OXYGEN SATURATION: 97 % | HEIGHT: 64 IN | HEART RATE: 97 BPM | RESPIRATION RATE: 20 BRPM | DIASTOLIC BLOOD PRESSURE: 86 MMHG

## 2018-08-29 DIAGNOSIS — Z01.818 ENCOUNTER FOR OTHER PREPROCEDURAL EXAMINATION: ICD-10-CM

## 2018-08-29 DIAGNOSIS — J45.20 MILD INTERMITTENT ASTHMA, UNCOMPLICATED: ICD-10-CM

## 2018-08-29 DIAGNOSIS — D25.9 LEIOMYOMA OF UTERUS, UNSPECIFIED: ICD-10-CM

## 2018-08-29 DIAGNOSIS — I10 ESSENTIAL (PRIMARY) HYPERTENSION: ICD-10-CM

## 2018-08-29 DIAGNOSIS — Z98.51 TUBAL LIGATION STATUS: Chronic | ICD-10-CM

## 2018-08-29 DIAGNOSIS — M17.0 BILATERAL PRIMARY OSTEOARTHRITIS OF KNEE: Chronic | ICD-10-CM

## 2018-08-29 LAB
ANION GAP SERPL CALC-SCNC: 14 MMOL/L — SIGNIFICANT CHANGE UP (ref 5–17)
BLD GP AB SCN SERPL QL: NEGATIVE — SIGNIFICANT CHANGE UP
BUN SERPL-MCNC: 15 MG/DL — SIGNIFICANT CHANGE UP (ref 7–23)
CALCIUM SERPL-MCNC: 9.4 MG/DL — SIGNIFICANT CHANGE UP (ref 8.4–10.5)
CHLORIDE SERPL-SCNC: 101 MMOL/L — SIGNIFICANT CHANGE UP (ref 96–108)
CO2 SERPL-SCNC: 24 MMOL/L — SIGNIFICANT CHANGE UP (ref 22–31)
CREAT SERPL-MCNC: 0.54 MG/DL — SIGNIFICANT CHANGE UP (ref 0.5–1.3)
GLUCOSE SERPL-MCNC: 81 MG/DL — SIGNIFICANT CHANGE UP (ref 70–99)
HBA1C BLD-MCNC: 6 % — HIGH (ref 4–5.6)
HCT VFR BLD CALC: 37.8 % — SIGNIFICANT CHANGE UP (ref 34.5–45)
HGB BLD-MCNC: 11 G/DL — LOW (ref 11.5–15.5)
MCHC RBC-ENTMCNC: 21.9 PG — LOW (ref 27–34)
MCHC RBC-ENTMCNC: 29.1 GM/DL — LOW (ref 32–36)
MCV RBC AUTO: 75.1 FL — LOW (ref 80–100)
PLATELET # BLD AUTO: 362 K/UL — SIGNIFICANT CHANGE UP (ref 150–400)
POTASSIUM SERPL-MCNC: 4 MMOL/L — SIGNIFICANT CHANGE UP (ref 3.5–5.3)
POTASSIUM SERPL-SCNC: 4 MMOL/L — SIGNIFICANT CHANGE UP (ref 3.5–5.3)
RBC # BLD: 5.03 M/UL — SIGNIFICANT CHANGE UP (ref 3.8–5.2)
RBC # FLD: 16 % — HIGH (ref 10.3–14.5)
RH IG SCN BLD-IMP: POSITIVE — SIGNIFICANT CHANGE UP
SODIUM SERPL-SCNC: 139 MMOL/L — SIGNIFICANT CHANGE UP (ref 135–145)
WBC # BLD: 8.76 K/UL — SIGNIFICANT CHANGE UP (ref 3.8–10.5)
WBC # FLD AUTO: 8.76 K/UL — SIGNIFICANT CHANGE UP (ref 3.8–10.5)

## 2018-08-29 PROCEDURE — 86900 BLOOD TYPING SEROLOGIC ABO: CPT

## 2018-08-29 PROCEDURE — 83036 HEMOGLOBIN GLYCOSYLATED A1C: CPT

## 2018-08-29 PROCEDURE — 86850 RBC ANTIBODY SCREEN: CPT

## 2018-08-29 PROCEDURE — 80048 BASIC METABOLIC PNL TOTAL CA: CPT

## 2018-08-29 PROCEDURE — 87086 URINE CULTURE/COLONY COUNT: CPT

## 2018-08-29 PROCEDURE — 86901 BLOOD TYPING SEROLOGIC RH(D): CPT

## 2018-08-29 PROCEDURE — 85027 COMPLETE CBC AUTOMATED: CPT

## 2018-08-29 PROCEDURE — G0463: CPT

## 2018-08-29 RX ORDER — CEFOTETAN DISODIUM 1 G
2 VIAL (EA) INJECTION ONCE
Qty: 0 | Refills: 0 | Status: DISCONTINUED | OUTPATIENT
Start: 2018-09-05 | End: 2018-09-06

## 2018-08-29 NOTE — H&P PST ADULT - HISTORY OF PRESENT ILLNESS
44 y/o female  with left ovarian cyst/ uterine fibroid planned for laparoscopic vaginal hysterectomy, left salpingo oophorectomy, right salpingectomy, cystoscopy, possible exploratory laparotomy, possible total laparoscopic hysterectomy on 18.

## 2018-08-29 NOTE — H&P PST ADULT - PSH
No significant past surgical history History of bilateral tubal ligation  17 years ago  Normal vaginal delivery

## 2018-08-29 NOTE — H&P PST ADULT - PROBLEM SELECTOR PLAN 1
laparoscopic vaginal hysterectomy, left salpingo oophorectomy, right salpingectomy, cystoscopy, possible exploratory laparotomy, possible total laparoscopic hysterectomy on 9/5/18.   PST labs send  preprocedure surgical scrub instructions discussed

## 2018-08-29 NOTE — H&P PST ADULT - NSANTHOSAYNRD_GEN_A_CORE
No. SASHA screening performed.  STOP BANG Legend: 0-2 = LOW Risk; 3-4 = INTERMEDIATE Risk; 5-8 = HIGH Risk/criteria

## 2018-08-29 NOTE — H&P PST ADULT - PMH
Hypertension Asthma  controlled meds denies any asthma attack  Hypertension    Morbid obesity with BMI of 45.0-49.9, adult    Osteoarthritis (arthritis due to wear and tear of joints)  knees  Uterine leiomyoma, unspecified location

## 2018-08-29 NOTE — H&P PST ADULT - NEGATIVE FEMALE-SPECIFIC SYMPTOMS
no vaginal discharge/no dysmenorrhea/no irregular menses/no amenorrhea/no spotting/not applicable/no menorrhagia/no abnormal vaginal bleeding

## 2018-08-30 ENCOUNTER — APPOINTMENT (OUTPATIENT)
Dept: INTERNAL MEDICINE | Facility: CLINIC | Age: 43
End: 2018-08-30
Payer: MEDICAID

## 2018-08-30 VITALS
HEIGHT: 63 IN | HEART RATE: 92 BPM | RESPIRATION RATE: 17 BRPM | TEMPERATURE: 98.8 F | BODY MASS INDEX: 51.21 KG/M2 | WEIGHT: 289 LBS | SYSTOLIC BLOOD PRESSURE: 140 MMHG | OXYGEN SATURATION: 97 % | DIASTOLIC BLOOD PRESSURE: 95 MMHG

## 2018-08-30 PROBLEM — J45.909 UNSPECIFIED ASTHMA, UNCOMPLICATED: Chronic | Status: ACTIVE | Noted: 2018-08-29

## 2018-08-30 LAB
CULTURE RESULTS: SIGNIFICANT CHANGE UP
SPECIMEN SOURCE: SIGNIFICANT CHANGE UP

## 2018-08-30 PROCEDURE — 99213 OFFICE O/P EST LOW 20 MIN: CPT

## 2018-08-30 RX ORDER — KETOTIFEN FUMARATE 0.25 MG/ML
0.03 SOLUTION/ DROPS OPHTHALMIC
Qty: 10 | Refills: 0 | Status: DISCONTINUED | COMMUNITY
Start: 2018-05-15 | End: 2018-08-30

## 2018-09-04 ENCOUNTER — TRANSCRIPTION ENCOUNTER (OUTPATIENT)
Age: 43
End: 2018-09-04

## 2018-09-05 ENCOUNTER — APPOINTMENT (OUTPATIENT)
Dept: OBGYN | Facility: HOSPITAL | Age: 43
End: 2018-09-05

## 2018-09-05 ENCOUNTER — RESULT REVIEW (OUTPATIENT)
Age: 43
End: 2018-09-05

## 2018-09-05 ENCOUNTER — INPATIENT (INPATIENT)
Facility: HOSPITAL | Age: 43
LOS: 0 days | Discharge: HOME | DRG: 742 | End: 2018-09-06
Attending: OBSTETRICS & GYNECOLOGY | Admitting: OBSTETRICS & GYNECOLOGY
Payer: MEDICAID

## 2018-09-05 VITALS — TEMPERATURE: 98 F

## 2018-09-05 DIAGNOSIS — Z98.51 TUBAL LIGATION STATUS: Chronic | ICD-10-CM

## 2018-09-05 DIAGNOSIS — D25.9 LEIOMYOMA OF UTERUS, UNSPECIFIED: ICD-10-CM

## 2018-09-05 DIAGNOSIS — Z01.818 ENCOUNTER FOR OTHER PREPROCEDURAL EXAMINATION: ICD-10-CM

## 2018-09-05 LAB — RH IG SCN BLD-IMP: POSITIVE — SIGNIFICANT CHANGE UP

## 2018-09-05 PROCEDURE — 88302 TISSUE EXAM BY PATHOLOGIST: CPT | Mod: 26

## 2018-09-05 PROCEDURE — 88331 PATH CONSLTJ SURG 1 BLK 1SPC: CPT | Mod: 26

## 2018-09-05 PROCEDURE — 88307 TISSUE EXAM BY PATHOLOGIST: CPT | Mod: 26

## 2018-09-05 PROCEDURE — 58554 LAPARO-VAG HYST W/T/O COMPL: CPT

## 2018-09-05 PROCEDURE — 88305 TISSUE EXAM BY PATHOLOGIST: CPT | Mod: 26

## 2018-09-05 RX ORDER — CELECOXIB 200 MG/1
200 CAPSULE ORAL ONCE
Qty: 0 | Refills: 0 | Status: COMPLETED | OUTPATIENT
Start: 2018-09-05 | End: 2018-09-05

## 2018-09-05 RX ORDER — OXYCODONE HYDROCHLORIDE 5 MG/1
5 TABLET ORAL
Qty: 0 | Refills: 0 | Status: DISCONTINUED | OUTPATIENT
Start: 2018-09-05 | End: 2018-09-06

## 2018-09-05 RX ORDER — ACETAMINOPHEN 500 MG
975 TABLET ORAL ONCE
Qty: 0 | Refills: 0 | Status: COMPLETED | OUTPATIENT
Start: 2018-09-05 | End: 2018-09-05

## 2018-09-05 RX ORDER — HEPARIN SODIUM 5000 [USP'U]/ML
5000 INJECTION INTRAVENOUS; SUBCUTANEOUS EVERY 8 HOURS
Qty: 0 | Refills: 0 | Status: DISCONTINUED | OUTPATIENT
Start: 2018-09-05 | End: 2018-09-06

## 2018-09-05 RX ORDER — FAMOTIDINE 10 MG/ML
20 INJECTION INTRAVENOUS ONCE
Qty: 0 | Refills: 0 | Status: DISCONTINUED | OUTPATIENT
Start: 2018-09-05 | End: 2018-09-05

## 2018-09-05 RX ORDER — LIDOCAINE HCL 20 MG/ML
0.2 VIAL (ML) INJECTION ONCE
Qty: 0 | Refills: 0 | Status: DISCONTINUED | OUTPATIENT
Start: 2018-09-05 | End: 2018-09-05

## 2018-09-05 RX ORDER — ONDANSETRON 8 MG/1
4 TABLET, FILM COATED ORAL EVERY 8 HOURS
Qty: 0 | Refills: 0 | Status: DISCONTINUED | OUTPATIENT
Start: 2018-09-05 | End: 2018-09-06

## 2018-09-05 RX ORDER — FAMOTIDINE 10 MG/ML
20 INJECTION INTRAVENOUS ONCE
Qty: 0 | Refills: 0 | Status: COMPLETED | OUTPATIENT
Start: 2018-09-05 | End: 2018-09-05

## 2018-09-05 RX ORDER — ACETAMINOPHEN 500 MG
650 TABLET ORAL EVERY 6 HOURS
Qty: 0 | Refills: 0 | Status: DISCONTINUED | OUTPATIENT
Start: 2018-09-05 | End: 2018-09-06

## 2018-09-05 RX ORDER — IBUPROFEN 200 MG
600 TABLET ORAL EVERY 6 HOURS
Qty: 0 | Refills: 0 | Status: DISCONTINUED | OUTPATIENT
Start: 2018-09-05 | End: 2018-09-06

## 2018-09-05 RX ORDER — FLUTICASONE PROPIONATE 50 MCG
1 SPRAY, SUSPENSION NASAL
Qty: 0 | Refills: 0 | Status: DISCONTINUED | OUTPATIENT
Start: 2018-09-05 | End: 2018-09-06

## 2018-09-05 RX ORDER — HYDROMORPHONE HYDROCHLORIDE 2 MG/ML
0.5 INJECTION INTRAMUSCULAR; INTRAVENOUS; SUBCUTANEOUS
Qty: 0 | Refills: 0 | Status: DISCONTINUED | OUTPATIENT
Start: 2018-09-05 | End: 2018-09-06

## 2018-09-05 RX ORDER — SODIUM CHLORIDE 9 MG/ML
3 INJECTION INTRAMUSCULAR; INTRAVENOUS; SUBCUTANEOUS EVERY 8 HOURS
Qty: 0 | Refills: 0 | Status: DISCONTINUED | OUTPATIENT
Start: 2018-09-05 | End: 2018-09-05

## 2018-09-05 RX ORDER — SODIUM CHLORIDE 9 MG/ML
1000 INJECTION, SOLUTION INTRAVENOUS
Qty: 0 | Refills: 0 | Status: DISCONTINUED | OUTPATIENT
Start: 2018-09-05 | End: 2018-09-06

## 2018-09-05 RX ORDER — HYDROCHLOROTHIAZIDE 25 MG
25 TABLET ORAL DAILY
Qty: 0 | Refills: 0 | Status: DISCONTINUED | OUTPATIENT
Start: 2018-09-05 | End: 2018-09-06

## 2018-09-05 RX ORDER — CELECOXIB 200 MG/1
200 CAPSULE ORAL ONCE
Qty: 0 | Refills: 0 | Status: DISCONTINUED | OUTPATIENT
Start: 2018-09-05 | End: 2018-09-05

## 2018-09-05 RX ORDER — AMLODIPINE BESYLATE 2.5 MG/1
5 TABLET ORAL DAILY
Qty: 0 | Refills: 0 | Status: DISCONTINUED | OUTPATIENT
Start: 2018-09-05 | End: 2018-09-06

## 2018-09-05 RX ORDER — ALBUTEROL 90 UG/1
2 AEROSOL, METERED ORAL EVERY 6 HOURS
Qty: 0 | Refills: 0 | Status: DISCONTINUED | OUTPATIENT
Start: 2018-09-05 | End: 2018-09-06

## 2018-09-05 RX ADMIN — FAMOTIDINE 20 MILLIGRAM(S): 10 INJECTION INTRAVENOUS at 11:41

## 2018-09-05 RX ADMIN — CELECOXIB 200 MILLIGRAM(S): 200 CAPSULE ORAL at 11:41

## 2018-09-05 RX ADMIN — HEPARIN SODIUM 5000 UNIT(S): 5000 INJECTION INTRAVENOUS; SUBCUTANEOUS at 22:17

## 2018-09-05 RX ADMIN — Medication 975 MILLIGRAM(S): at 11:40

## 2018-09-05 NOTE — BRIEF OPERATIVE NOTE - PROCEDURE
<<-----Click on this checkbox to enter Procedure Diagnostic laparoscopy by gynecology  09/05/2018    Active  ZACHARY  Left oophorectomy  09/05/2018    Active  ZACHARY  Bilateral salpingectomy  09/05/2018    Active  ZACHARY  Total vaginal hysterectomy  09/05/2018    Active  ZACHARY

## 2018-09-05 NOTE — BRIEF OPERATIVE NOTE - POST-OP DX
Endometrioma of ovary  09/05/2018    Active  Molly Lemus  Intramural leiomyoma of uterus  09/05/2018    Active  Molly Lemus

## 2018-09-05 NOTE — BRIEF OPERATIVE NOTE - OPERATION/FINDINGS
Fibroid uterus with multiple intramural fibroids large anterior 3cm  and posterior 5cm intramural . Fibroid uterus with multiple intramural fibroids large anterior 3cm  and posterior 5cm intramural .  Bilateral fallopian tubes noted to be previously ligated.  Left ovary with large endometrioma, frozen sections benign.  Right ovary grossly normal.  Excellent hemostasis of all operative sites and layers.

## 2018-09-05 NOTE — BRIEF OPERATIVE NOTE - PRE-OP DX
Intramural leiomyoma of uterus  09/05/2018    Active  Molly Lemus  Left ovarian cyst  09/05/2018    Active  Molly Lemus

## 2018-09-06 ENCOUNTER — TRANSCRIPTION ENCOUNTER (OUTPATIENT)
Age: 43
End: 2018-09-06

## 2018-09-06 VITALS
HEART RATE: 89 BPM | OXYGEN SATURATION: 94 % | SYSTOLIC BLOOD PRESSURE: 108 MMHG | RESPIRATION RATE: 16 BRPM | TEMPERATURE: 98 F | DIASTOLIC BLOOD PRESSURE: 67 MMHG

## 2018-09-06 DIAGNOSIS — Z98.890 OTHER SPECIFIED POSTPROCEDURAL STATES: ICD-10-CM

## 2018-09-06 LAB
HCT VFR BLD CALC: 31.4 % — LOW (ref 34.5–45)
HGB BLD-MCNC: 9.8 G/DL — LOW (ref 11.5–15.5)
MCHC RBC-ENTMCNC: 22.7 PG — LOW (ref 27–34)
MCHC RBC-ENTMCNC: 31.2 GM/DL — LOW (ref 32–36)
MCV RBC AUTO: 72.9 FL — LOW (ref 80–100)
PLATELET # BLD AUTO: 289 K/UL — SIGNIFICANT CHANGE UP (ref 150–400)
RBC # BLD: 4.31 M/UL — SIGNIFICANT CHANGE UP (ref 3.8–5.2)
RBC # FLD: 15 % — HIGH (ref 10.3–14.5)
WBC # BLD: 12.4 K/UL — HIGH (ref 3.8–10.5)
WBC # FLD AUTO: 12.4 K/UL — HIGH (ref 3.8–10.5)

## 2018-09-06 PROCEDURE — 88331 PATH CONSLTJ SURG 1 BLK 1SPC: CPT

## 2018-09-06 PROCEDURE — 85027 COMPLETE CBC AUTOMATED: CPT

## 2018-09-06 PROCEDURE — 94640 AIRWAY INHALATION TREATMENT: CPT

## 2018-09-06 PROCEDURE — 82962 GLUCOSE BLOOD TEST: CPT

## 2018-09-06 PROCEDURE — 88302 TISSUE EXAM BY PATHOLOGIST: CPT

## 2018-09-06 PROCEDURE — 88305 TISSUE EXAM BY PATHOLOGIST: CPT

## 2018-09-06 PROCEDURE — 86900 BLOOD TYPING SEROLOGIC ABO: CPT

## 2018-09-06 PROCEDURE — 99238 HOSP IP/OBS DSCHRG MGMT 30/<: CPT | Mod: 24

## 2018-09-06 PROCEDURE — 88307 TISSUE EXAM BY PATHOLOGIST: CPT

## 2018-09-06 PROCEDURE — 86901 BLOOD TYPING SEROLOGIC RH(D): CPT

## 2018-09-06 RX ORDER — ACETAMINOPHEN 500 MG
2 TABLET ORAL
Qty: 0 | Refills: 0 | COMMUNITY
Start: 2018-09-06

## 2018-09-06 RX ORDER — IBUPROFEN 200 MG
1 TABLET ORAL
Qty: 0 | Refills: 0 | DISCHARGE
Start: 2018-09-06

## 2018-09-06 RX ORDER — OXYCODONE HYDROCHLORIDE 5 MG/1
1 TABLET ORAL
Qty: 20 | Refills: 0 | OUTPATIENT
Start: 2018-09-06 | End: 2018-09-10

## 2018-09-06 RX ADMIN — Medication 600 MILLIGRAM(S): at 06:15

## 2018-09-06 RX ADMIN — HEPARIN SODIUM 5000 UNIT(S): 5000 INJECTION INTRAVENOUS; SUBCUTANEOUS at 06:00

## 2018-09-06 RX ADMIN — Medication 25 MILLIGRAM(S): at 11:24

## 2018-09-06 RX ADMIN — SODIUM CHLORIDE 100 MILLILITER(S): 9 INJECTION, SOLUTION INTRAVENOUS at 07:22

## 2018-09-06 RX ADMIN — Medication 600 MILLIGRAM(S): at 11:38

## 2018-09-06 RX ADMIN — AMLODIPINE BESYLATE 5 MILLIGRAM(S): 2.5 TABLET ORAL at 05:57

## 2018-09-06 RX ADMIN — Medication 1 SPRAY(S): at 11:24

## 2018-09-06 RX ADMIN — Medication 600 MILLIGRAM(S): at 06:00

## 2018-09-06 NOTE — PROGRESS NOTE ADULT - SUBJECTIVE AND OBJECTIVE BOX
Patient seen and examined at bedside, recently post-op. No acute complaints at this time. Patient has not experienced any pain. She has not yet ambulated. She has tolerated ice chips without nausea and vomiting. She denies any vaginal bleeding at this time. Denies CP, SOB, N/V, fevers, and chills.    Vital Signs Last 24 Hours  T(C): 36 (09-05-18 @ 22:00), Max: 36.9 (09-05-18 @ 11:15)  HR: 91 (09-05-18 @ 23:30) (83 - 106)  BP: 147/78 (09-05-18 @ 23:30) (136/84 - 152/84)  RR: 16 (09-05-18 @ 23:30) (16 - 16)  SpO2: 97% (09-05-18 @ 23:30) (85% - 100%)    I&O's Summary    05 Sep 2018 07:01  -  06 Sep 2018 00:20  --------------------------------------------------------  IN: 200 mL / OUT: 175 mL / NET: 25 mL        Physical Exam:  General: NAD  CV: NR, RR, S1, S2, no M/R/G  Lungs: CTA-B  Abdomen: Soft, non-tender, non-distended, hypoactive BS  Incision: umbilical port and R sided port incision, CDI, dressing in place  Ext: No pain or swelling          MEDICATIONS  (STANDING):  amLODIPine   Tablet 5 milliGRAM(s) Oral daily  cefoTEtan  IVPB 2 Gram(s) IV Intermittent once  fluticasone propionate 50 MICROgram(s)/spray Nasal Spray 1 Spray(s) Both Nostrils two times a day  heparin  Injectable 5000 Unit(s) SubCutaneous every 8 hours  hydrochlorothiazide 25 milliGRAM(s) Oral daily  ibuprofen  Tablet. 600 milliGRAM(s) Oral every 6 hours  lactated ringers. 1000 milliLiter(s) (200 mL/Hr) IV Continuous <Continuous>  lactated ringers. 1000 milliLiter(s) (100 mL/Hr) IV Continuous <Continuous>    MEDICATIONS  (PRN):  acetaminophen   Tablet .. 650 milliGRAM(s) Oral every 6 hours PRN Moderate Pain (4 - 6)  ALBUTerol    90 MICROgram(s) HFA Inhaler 2 Puff(s) Inhalation every 6 hours PRN Shortness of Breath and/or Wheezing  HYDROmorphone  Injectable 0.5 milliGRAM(s) IV Push every 10 minutes PRN Moderate Pain (4 - 6)  ondansetron Injectable 4 milliGRAM(s) IV Push every 8 hours PRN Nausea and/or Vomiting  oxyCODONE    IR 5 milliGRAM(s) Oral every 3 hours PRN Severe Pain (7 - 10)

## 2018-09-06 NOTE — DISCHARGE NOTE ADULT - CARE PROVIDER_API CALL
Ronni Alvarez), Obstetrics and Gynecology  [address] [place] [address]  Phone: [phone]  Fax: [phone]

## 2018-09-06 NOTE — DISCHARGE NOTE ADULT - ADDITIONAL INSTRUCTIONS
Follow in two weeks nothing in vagina (sex, tampons, douching) no heavy lifting (>10 lbs) for 6 weeks. Please return to ER or call your doctors office if pain is worsening, you are unable to keep down food or drink, fever >101, heavy vaginal bleeding. You are able to take a shower regularly. Patient to follow up with Dr. Alvarez in 2 weeks.

## 2018-09-06 NOTE — DISCHARGE NOTE ADULT - HOSPITAL COURSE
Patient underwent an uncomplicated LSC vaginal hysterectomy, Left Salpingo-Oophorectomy, RS. , Hct: 37.8->31.4. Patient’s postoperative course was unremarkable and she remained hemodynamically stable and afebrile throughout. Upon discharge on POD1, the patient is ambulating and voiding spontaneously, tolerating oral intake, pain was well controlled with oral medication, and vital signs were stable. Patient underwent an uncomplicated LSC vaginal hysterectomy, Left Salpingo-Oophorectomy, RS. See operative note for details of procedure. , Hct: 37.8->31.4. Patient’s postoperative course was unremarkable and she remained hemodynamically stable and afebrile throughout. Upon discharge on POD1, the patient is ambulating and voiding spontaneously, tolerating oral intake, pain was well controlled with oral medication, and vital signs were stable.

## 2018-09-06 NOTE — DISCHARGE NOTE ADULT - MEDICATION SUMMARY - MEDICATIONS TO TAKE
I will START or STAY ON the medications listed below when I get home from the hospital:    acetaminophen 325 mg oral tablet  -- 2 tab(s) by mouth every 6 hours, As needed, Moderate Pain (4 - 6)  -- Indication: For Pain control    ibuprofen 600 mg oral tablet  -- 1 tab(s) by mouth every 6 hours  -- Indication: For Pain control    Oxaydo 5 mg oral tablet  -- 1 tab(s) by mouth every 6 hours MDD:4 tabs  -- Indication: For Pain control    Ventolin HFA 90 mcg/inh inhalation aerosol  -- 2 puff(s) inhaled 4 times a day, As Needed  -- Indication: For continue    amLODIPine 5 mg oral tablet  -- 1 tab(s) by mouth once a day  -- Indication: For continue    triamterene  -- orally once a day  -- Indication: For continue    Flovent Diskus 250 mcg/inh inhalation powder  -- 1 puff(s) inhaled 2 times a day  -- Indication: For continue I will START or STAY ON the medications listed below when I get home from the hospital:    ibuprofen 600 mg oral tablet  -- 1 tab(s) by mouth every 6 hours  -- Indication: For Pain control    Ventolin HFA 90 mcg/inh inhalation aerosol  -- 2 puff(s) inhaled 4 times a day, As Needed  -- Indication: For continue    amLODIPine 5 mg oral tablet  -- 1 tab(s) by mouth once a day  -- Indication: For continue    triamterene  -- orally once a day  -- Indication: For continue    Flovent Diskus 250 mcg/inh inhalation powder  -- 1 puff(s) inhaled 2 times a day  -- Indication: For continue

## 2018-09-06 NOTE — DISCHARGE NOTE ADULT - PATIENT PORTAL LINK FT
You can access the [facility], offered by [facility], by registering with the following website: [email or web address]

## 2018-09-06 NOTE — ASU DISCHARGE PLAN (ADULT/PEDIATRIC). - NOTIFY
Pain not relieved by Medications/Persistent Nausea and Vomiting/Fever greater than 101/Bleeding that does not stop/Unable to Urinate

## 2018-09-06 NOTE — DISCHARGE NOTE ADULT - CONTRAINDICATIONS & PRECAUTIONS (SELECT ALL THAT APPLY)
Patient/surrogate refused vaccine.../Moderate to severe acute illness with or without fever. Delay administration of vaccination until patient has been afebrile or illness resolved for 24hrs

## 2018-09-06 NOTE — PROGRESS NOTE ADULT - SUBJECTIVE AND OBJECTIVE BOX
Subjective  Patient evaluated at bedside. No events overnight.  Pain well controlled.  Tolerating clears, has not attempted solids, no nausea or emesis.  Not out of bed.  Calvo in place.  No flatus.  No vaginal spotting.  Denies fevers/chills/chest pain/sob.    acetaminophen   Tablet .. 650 milliGRAM(s) Oral every 6 hours PRN  ALBUTerol    90 MICROgram(s) HFA Inhaler 2 Puff(s) Inhalation every 6 hours PRN  amLODIPine   Tablet 5 milliGRAM(s) Oral daily  cefoTEtan  IVPB 2 Gram(s) IV Intermittent once  fluticasone propionate 50 MICROgram(s)/spray Nasal Spray 1 Spray(s) Both Nostrils two times a day  heparin  Injectable 5000 Unit(s) SubCutaneous every 8 hours  hydrochlorothiazide 25 milliGRAM(s) Oral daily  HYDROmorphone  Injectable 0.5 milliGRAM(s) IV Push every 10 minutes PRN  ibuprofen  Tablet. 600 milliGRAM(s) Oral every 6 hours  lactated ringers. 1000 milliLiter(s) IV Continuous <Continuous>  lactated ringers. 1000 milliLiter(s) IV Continuous <Continuous>  ondansetron Injectable 4 milliGRAM(s) IV Push every 8 hours PRN  oxyCODONE    IR 5 milliGRAM(s) Oral every 3 hours PRN      Objective  Physical exam  VS: T(C): 36.5 (09-06-18 @ 00:00), Max: 36.5 (09-06-18 @ 00:00)  HR: 70 (09-06-18 @ 04:00) (70 - 106)  BP: 145/78 (09-06-18 @ 04:00) (136/84 - 159/80)  RR: 16 (09-06-18 @ 04:00) (16 - 16)  SpO2: 100% (09-06-18 @ 04:00) (85% - 100%)  Gen: No acute distress, alert and oriented  CV: Regular rate and rhythm  Pulm: Clear to ascultation bilaterally  Abd: soft, corpulent, nontender non distended.  Umbilical port site and right lateral site with tegaderm and telfa c/d/i dressing in place.  Ext: nontender bilaterally, SCDs in place      09-05 @ 07:01  -  09-06 @ 06:52  --------------------------------------------------------  IN: 1400 mL / OUT: 1200 mL / NET: 200 mL                            9.8    12.4  )-----------( 289      ( 06 Sep 2018 05:38 )             31.4                         11.0   8.76  )-----------( 362      ( 29 Aug 2018 14:46 )             37.8         A/P  43yF POD#1 s/p Laparoscopic assisted total vaginal hysterectomy, bilateral salpingectomy, left oophorectomy and cystoscopy. EBL 750cc, no complications  Neuro: Pain controlled with PO meds, last received motrin this am with pain relief.  CV: Hemodynamically stable, am cbc appropriate for EBL.  BPs hypertensive (136/84 - 159/80), known cHTN, will continue patients home antihypertensives.   Pulm: Oxygenating well on room air, continue IS.  Patient oxygenating well on 2L [place] overnight, currently weaned to room air and oxygenating 99% during my exam on room air.  Continue home asthma medication.  GI: Tolerating clears, advance to regular diet.  : Adequate UOP, continue to monitor I's/O's.  Calvo catheter discontinued, due to void by 2 pm.  ID: afebrile, WBC appropriate.  Heme: DVT prophylaxis with HSQ, early ambulation and SCDs.  Dispo: will consider discharging home today after void pending patient condition and attending approval.      Molly Gao PGY4  [id number]

## 2018-09-06 NOTE — ASU DISCHARGE PLAN (ADULT/PEDIATRIC). - MEDICATION SUMMARY - MEDICATIONS TO TAKE
I will START or STAY ON the medications listed below when I get home from the hospital:    ibuprofen 600 mg oral tablet  -- 1 tab(s) by mouth every 6 hours  -- Indication: For Pain    Ventolin HFA 90 mcg/inh inhalation aerosol  -- 2 puff(s) inhaled 4 times a day, As Needed  -- Indication: For asthma    amLODIPine 5 mg oral tablet  -- 1 tab(s) by mouth once a day  -- Indication: For HTN    triamterene  -- orally once a day  -- Indication: For HTN    Flovent Diskus 250 mcg/inh inhalation powder  -- 1 puff(s) inhaled 2 times a day  -- Indication: For asthma

## 2018-09-06 NOTE — PROGRESS NOTE ADULT - PROBLEM SELECTOR PLAN 1
Neuro: tylenol and motrin PRN for pain management  CV: Hemodynamically stable, AM CBC  Pulm: Saturating well on room air, encourage incentive spirometry  GI: regular diet  : mccormack in situ  Heme: c/w HSQ and SCDs for DVT ppx  Dispo: Continue routine post-op care    BRIAN Fernandez pgy2

## 2018-09-06 NOTE — DISCHARGE NOTE ADULT - PLAN OF CARE
recovery gynrecov .d/c Patient underwent an uncomplicated LSC vaginal hysterectomy, Left Salpingo-Oophorectomy, RS. POD#1, advanced to a regular diet, mccormack was discontinued and patient voided spontaneously.  Patient was discharged on POD#1 in stable condition with adequate pain control, ambulating, tolerating PO and voiding spontaneously. Patient to follow up with Dr. Alvarez in 2 weeks.

## 2018-09-06 NOTE — PACU DISCHARGE NOTE - COMMENTS
Stable for D/C from PACU to floor with 2L O2 nasal cannula and continuous pulse ox monitoring. O2 sats % on nasal cannula O2.

## 2018-09-06 NOTE — DISCHARGE NOTE ADULT - CARE PLAN
Principal Discharge DX:	Postoperative state  Goal:	recovery  Assessment and plan of treatment:	jorge .d/c Principal Discharge DX:	Postoperative state  Goal:	recovery  Assessment and plan of treatment:	Patient underwent an uncomplicated LSC vaginal hysterectomy, Left Salpingo-Oophorectomy, RS. POD#1, advanced to a regular diet, mccormack was discontinued and patient voided spontaneously.  Patient was discharged on POD#1 in stable condition with adequate pain control, ambulating, tolerating PO and voiding spontaneously. Patient to follow up with Dr. Alvarez in 2 weeks.

## 2018-09-10 PROBLEM — M19.90 UNSPECIFIED OSTEOARTHRITIS, UNSPECIFIED SITE: Chronic | Status: ACTIVE | Noted: 2018-08-29

## 2018-09-19 ENCOUNTER — APPOINTMENT (OUTPATIENT)
Dept: OBGYN | Facility: CLINIC | Age: 43
End: 2018-09-19

## 2018-09-27 ENCOUNTER — OUTPATIENT (OUTPATIENT)
Dept: OUTPATIENT SERVICES | Facility: HOSPITAL | Age: 43
LOS: 1 days | End: 2018-09-27
Payer: MEDICAID

## 2018-09-27 ENCOUNTER — APPOINTMENT (OUTPATIENT)
Dept: OBGYN | Facility: CLINIC | Age: 43
End: 2018-09-27
Payer: MEDICAID

## 2018-09-27 VITALS — DIASTOLIC BLOOD PRESSURE: 100 MMHG | BODY MASS INDEX: 50.49 KG/M2 | WEIGHT: 285 LBS | SYSTOLIC BLOOD PRESSURE: 152 MMHG

## 2018-09-27 DIAGNOSIS — N76.0 ACUTE VAGINITIS: ICD-10-CM

## 2018-09-27 DIAGNOSIS — Z98.51 TUBAL LIGATION STATUS: Chronic | ICD-10-CM

## 2018-09-27 DIAGNOSIS — Z01.818 ENCOUNTER FOR OTHER PREPROCEDURAL EXAMINATION: ICD-10-CM

## 2018-09-27 DIAGNOSIS — Z98.890 OTHER SPECIFIED POSTPROCEDURAL STATES: ICD-10-CM

## 2018-09-27 DIAGNOSIS — Z86.018 PERSONAL HISTORY OF OTHER BENIGN NEOPLASM: ICD-10-CM

## 2018-09-27 DIAGNOSIS — N83.202 UNSPECIFIED OVARIAN CYST, LEFT SIDE: ICD-10-CM

## 2018-09-27 PROCEDURE — 99214 OFFICE O/P EST MOD 30 MIN: CPT | Mod: NC

## 2018-09-27 PROCEDURE — G0463: CPT

## 2018-10-12 ENCOUNTER — TRANSCRIPTION ENCOUNTER (OUTPATIENT)
Age: 43
End: 2018-10-12

## 2018-10-12 ENCOUNTER — OTHER (OUTPATIENT)
Age: 43
End: 2018-10-12

## 2018-12-11 NOTE — PACU DISCHARGE NOTE - NSCLINEINSERTRD_GEN_ALL_CORE
No Helical Rim Advancement Flap Text: The defect edges were debeveled with a #15 blade scalpel.  Given the location of the defect and the proximity to free margins (helical rim) a double helical rim advancement flap was deemed most appropriate.  Using a sterile surgical marker, the appropriate advancement flaps were drawn incorporating the defect and placing the expected incisions between the helical rim and antihelix where possible.  The area thus outlined was incised through and through with a #15 scalpel blade.  With a skin hook and iris scissors, the flaps were gently and sharply undermined and freed up.

## 2018-12-28 ENCOUNTER — RX CHANGE (OUTPATIENT)
Age: 43
End: 2018-12-28

## 2019-02-20 ENCOUNTER — OTHER (OUTPATIENT)
Age: 44
End: 2019-02-20

## 2019-03-14 ENCOUNTER — TRANSCRIPTION ENCOUNTER (OUTPATIENT)
Age: 44
End: 2019-03-14

## 2019-03-25 ENCOUNTER — RX RENEWAL (OUTPATIENT)
Age: 44
End: 2019-03-25

## 2019-06-07 ENCOUNTER — APPOINTMENT (OUTPATIENT)
Dept: INTERNAL MEDICINE | Facility: CLINIC | Age: 44
End: 2019-06-07
Payer: MEDICAID

## 2019-06-07 ENCOUNTER — NON-APPOINTMENT (OUTPATIENT)
Age: 44
End: 2019-06-07

## 2019-06-07 VITALS
OXYGEN SATURATION: 96 % | TEMPERATURE: 98.6 F | WEIGHT: 276 LBS | RESPIRATION RATE: 17 BRPM | SYSTOLIC BLOOD PRESSURE: 141 MMHG | BODY MASS INDEX: 48.9 KG/M2 | DIASTOLIC BLOOD PRESSURE: 87 MMHG | HEART RATE: 81 BPM | HEIGHT: 63 IN

## 2019-06-07 DIAGNOSIS — M72.2 PLANTAR FASCIAL FIBROMATOSIS: ICD-10-CM

## 2019-06-07 DIAGNOSIS — N94.6 DYSMENORRHEA, UNSPECIFIED: ICD-10-CM

## 2019-06-07 DIAGNOSIS — Z98.890 OTHER SPECIFIED POSTPROCEDURAL STATES: ICD-10-CM

## 2019-06-07 DIAGNOSIS — Z87.09 PERSONAL HISTORY OF OTHER DISEASES OF THE RESPIRATORY SYSTEM: ICD-10-CM

## 2019-06-07 DIAGNOSIS — Z87.440 PERSONAL HISTORY OF URINARY (TRACT) INFECTIONS: ICD-10-CM

## 2019-06-07 PROCEDURE — 93000 ELECTROCARDIOGRAM COMPLETE: CPT

## 2019-06-07 PROCEDURE — 99396 PREV VISIT EST AGE 40-64: CPT | Mod: 25

## 2019-06-07 RX ORDER — TRIAMCINOLONE ACETONIDE 55 UG/1
55 SPRAY, METERED NASAL
Qty: 1 | Refills: 3 | Status: DISCONTINUED | COMMUNITY
Start: 2018-12-28 | End: 2019-06-07

## 2019-06-07 RX ORDER — KETOTIFEN FUMARATE 0.25 MG/ML
0.03 SOLUTION OPHTHALMIC
Qty: 1 | Refills: 1 | Status: DISCONTINUED | COMMUNITY
Start: 2017-01-13 | End: 2019-06-07

## 2019-06-11 LAB
25(OH)D3 SERPL-MCNC: 43.4 NG/ML
ALBUMIN SERPL ELPH-MCNC: 4.4 G/DL
ALP BLD-CCNC: 71 U/L
ALT SERPL-CCNC: 22 U/L
ANION GAP SERPL CALC-SCNC: 10 MMOL/L
AST SERPL-CCNC: 21 U/L
BASOPHILS # BLD AUTO: 0.07 K/UL
BASOPHILS NFR BLD AUTO: 0.8 %
BILIRUB SERPL-MCNC: 0.2 MG/DL
BUN SERPL-MCNC: 14 MG/DL
CALCIUM SERPL-MCNC: 9.8 MG/DL
CHLORIDE SERPL-SCNC: 101 MMOL/L
CHOLEST SERPL-MCNC: 149 MG/DL
CHOLEST/HDLC SERPL: 1.8 RATIO
CO2 SERPL-SCNC: 28 MMOL/L
CREAT SERPL-MCNC: 0.54 MG/DL
CREAT SPEC-SCNC: 85 MG/DL
EOSINOPHIL # BLD AUTO: 0.17 K/UL
EOSINOPHIL NFR BLD AUTO: 2.1 %
ESTIMATED AVERAGE GLUCOSE: 123 MG/DL
GLUCOSE SERPL-MCNC: 92 MG/DL
HAV IGM SER QL: NONREACTIVE
HBA1C MFR BLD HPLC: 5.9 %
HBV CORE IGM SER QL: NONREACTIVE
HBV SURFACE AG SER QL: NONREACTIVE
HCT VFR BLD CALC: 44.7 %
HCV AB SER QL: NONREACTIVE
HCV S/CO RATIO: 0.11 S/CO
HDLC SERPL-MCNC: 84 MG/DL
HGB BLD-MCNC: 13.4 G/DL
IMM GRANULOCYTES NFR BLD AUTO: 0.4 %
LDLC SERPL CALC-MCNC: 52 MG/DL
LYMPHOCYTES # BLD AUTO: 2.67 K/UL
LYMPHOCYTES NFR BLD AUTO: 32.2 %
MAN DIFF?: NORMAL
MCHC RBC-ENTMCNC: 23.8 PG
MCHC RBC-ENTMCNC: 30 GM/DL
MCV RBC AUTO: 79.4 FL
MEV IGG FLD QL IA: 103 AU/ML
MEV IGG+IGM SER-IMP: POSITIVE
MICROALBUMIN 24H UR DL<=1MG/L-MCNC: <1.2 MG/DL
MICROALBUMIN/CREAT 24H UR-RTO: NORMAL MG/G
MONOCYTES # BLD AUTO: 0.47 K/UL
MONOCYTES NFR BLD AUTO: 5.7 %
MUV AB SER-ACNC: NEGATIVE
MUV IGG SER QL IA: <5 AU/ML
NEUTROPHILS # BLD AUTO: 4.88 K/UL
NEUTROPHILS NFR BLD AUTO: 58.8 %
PLATELET # BLD AUTO: 293 K/UL
POTASSIUM SERPL-SCNC: 4.6 MMOL/L
PROT SERPL-MCNC: 7.8 G/DL
RBC # BLD: 5.63 M/UL
RBC # FLD: 15.3 %
RUBV IGG FLD-ACNC: 1.2 INDEX
RUBV IGG SER-IMP: POSITIVE
SODIUM SERPL-SCNC: 139 MMOL/L
T4 FREE SERPL-MCNC: 1.4 NG/DL
TRIGL SERPL-MCNC: 66 MG/DL
TSH SERPL-ACNC: 2.55 UIU/ML
WBC # FLD AUTO: 8.29 K/UL

## 2019-06-18 ENCOUNTER — APPOINTMENT (OUTPATIENT)
Dept: INTERNAL MEDICINE | Facility: CLINIC | Age: 44
End: 2019-06-18

## 2019-06-24 ENCOUNTER — FORM ENCOUNTER (OUTPATIENT)
Age: 44
End: 2019-06-24

## 2019-06-25 ENCOUNTER — OUTPATIENT (OUTPATIENT)
Dept: OUTPATIENT SERVICES | Facility: HOSPITAL | Age: 44
LOS: 1 days | End: 2019-06-25
Payer: MEDICAID

## 2019-06-25 ENCOUNTER — APPOINTMENT (OUTPATIENT)
Dept: ULTRASOUND IMAGING | Facility: CLINIC | Age: 44
End: 2019-06-25
Payer: MEDICAID

## 2019-06-25 ENCOUNTER — APPOINTMENT (OUTPATIENT)
Dept: MAMMOGRAPHY | Facility: CLINIC | Age: 44
End: 2019-06-25
Payer: MEDICAID

## 2019-06-25 DIAGNOSIS — Z00.8 ENCOUNTER FOR OTHER GENERAL EXAMINATION: ICD-10-CM

## 2019-06-25 DIAGNOSIS — Z98.51 TUBAL LIGATION STATUS: Chronic | ICD-10-CM

## 2019-06-25 PROCEDURE — 76641 ULTRASOUND BREAST COMPLETE: CPT | Mod: 26,50

## 2019-06-25 PROCEDURE — 77063 BREAST TOMOSYNTHESIS BI: CPT | Mod: 26

## 2019-06-25 PROCEDURE — 77063 BREAST TOMOSYNTHESIS BI: CPT

## 2019-06-25 PROCEDURE — 77067 SCR MAMMO BI INCL CAD: CPT | Mod: 26

## 2019-06-25 PROCEDURE — 76641 ULTRASOUND BREAST COMPLETE: CPT

## 2019-06-25 PROCEDURE — 77067 SCR MAMMO BI INCL CAD: CPT

## 2019-06-28 ENCOUNTER — TRANSCRIPTION ENCOUNTER (OUTPATIENT)
Age: 44
End: 2019-06-28

## 2019-06-28 ENCOUNTER — MESSAGE (OUTPATIENT)
Age: 44
End: 2019-06-28

## 2019-07-05 ENCOUNTER — APPOINTMENT (OUTPATIENT)
Dept: INTERNAL MEDICINE | Facility: CLINIC | Age: 44
End: 2019-07-05
Payer: MEDICAID

## 2019-07-05 PROCEDURE — 90471 IMMUNIZATION ADMIN: CPT

## 2019-07-05 PROCEDURE — 90707 MMR VACCINE SC: CPT

## 2019-08-06 ENCOUNTER — OUTPATIENT (OUTPATIENT)
Dept: OUTPATIENT SERVICES | Facility: HOSPITAL | Age: 44
LOS: 1 days | End: 2019-08-06
Payer: MEDICAID

## 2019-08-06 ENCOUNTER — APPOINTMENT (OUTPATIENT)
Dept: OBGYN | Facility: CLINIC | Age: 44
End: 2019-08-06
Payer: MEDICAID

## 2019-08-06 VITALS
BODY MASS INDEX: 50.57 KG/M2 | WEIGHT: 285.5 LBS | DIASTOLIC BLOOD PRESSURE: 100 MMHG | SYSTOLIC BLOOD PRESSURE: 142 MMHG

## 2019-08-06 DIAGNOSIS — N76.0 ACUTE VAGINITIS: ICD-10-CM

## 2019-08-06 DIAGNOSIS — Z98.51 TUBAL LIGATION STATUS: Chronic | ICD-10-CM

## 2019-08-06 DIAGNOSIS — Z01.419 ENCOUNTER FOR GYNECOLOGICAL EXAMINATION (GENERAL) (ROUTINE) W/OUT ABNORMAL FINDINGS: ICD-10-CM

## 2019-08-06 PROCEDURE — 99214 OFFICE O/P EST MOD 30 MIN: CPT | Mod: NC

## 2019-08-06 PROCEDURE — G0463: CPT

## 2019-08-06 NOTE — PHYSICAL EXAM
[Awake] : awake [Alert] : alert [Goiter] : goiter [Soft] : soft [Oriented x3] : oriented to person, place, and time [No Lesions] : no genitalia lesions [Labia Majora] : labia major [Labia Minora] : labia minora [Normal] : clitoris [No Bleeding] : there was no active vaginal bleeding [Absent] : absent [Uterine Adnexae] : were not tender and not enlarged [No Tenderness] : no rectal tenderness [Nl Sphincter Tone] : normal sphincter tone [RRR, No Murmurs] : RRR, no murmurs [CTAB] : CTAB [Acute Distress] : no acute distress [LAD] : no lymphadenopathy [Mass] : no breast mass [Nipple Discharge] : no nipple discharge [Axillary LAD] : no axillary lymphadenopathy [Tender] : non tender [Tenderness] : nontender [Adnexa Tenderness] : were not tender

## 2019-08-06 NOTE — HISTORY OF PRESENT ILLNESS
[Sexually Active] : is sexually active [Monogamous] : is monogamous [Male ___] : [unfilled] male [Regular Cycle Intervals] : periods have been irregular

## 2019-08-12 DIAGNOSIS — I10 ESSENTIAL (PRIMARY) HYPERTENSION: ICD-10-CM

## 2019-08-12 DIAGNOSIS — E04.9 NONTOXIC GOITER, UNSPECIFIED: ICD-10-CM

## 2019-08-12 DIAGNOSIS — E66.01 MORBID (SEVERE) OBESITY DUE TO EXCESS CALORIES: ICD-10-CM

## 2019-08-21 ENCOUNTER — APPOINTMENT (OUTPATIENT)
Dept: ULTRASOUND IMAGING | Facility: CLINIC | Age: 44
End: 2019-08-21

## 2019-10-02 ENCOUNTER — OUTPATIENT (OUTPATIENT)
Dept: OUTPATIENT SERVICES | Facility: HOSPITAL | Age: 44
LOS: 1 days | End: 2019-10-02
Payer: MEDICAID

## 2019-10-02 ENCOUNTER — APPOINTMENT (OUTPATIENT)
Dept: PEDIATRIC ALLERGY IMMUNOLOGY | Facility: CLINIC | Age: 44
End: 2019-10-02
Payer: MEDICAID

## 2019-10-02 ENCOUNTER — NON-APPOINTMENT (OUTPATIENT)
Age: 44
End: 2019-10-02

## 2019-10-02 VITALS
HEIGHT: 64 IN | BODY MASS INDEX: 47.8 KG/M2 | OXYGEN SATURATION: 97 % | HEART RATE: 77 BPM | WEIGHT: 280 LBS | SYSTOLIC BLOOD PRESSURE: 148 MMHG | DIASTOLIC BLOOD PRESSURE: 94 MMHG

## 2019-10-02 DIAGNOSIS — Z98.51 TUBAL LIGATION STATUS: Chronic | ICD-10-CM

## 2019-10-02 PROCEDURE — G0463: CPT | Mod: 25

## 2019-10-02 PROCEDURE — 99213 OFFICE O/P EST LOW 20 MIN: CPT

## 2019-10-02 PROCEDURE — 94010 BREATHING CAPACITY TEST: CPT

## 2019-10-06 NOTE — IMPRESSION
[Spirometry] : Spirometry [Normal Spirometry] : spirometry normal [FreeTextEntry1] : FVC 88%, FEV1 92%, FEV/% today in office.

## 2019-10-06 NOTE — PHYSICAL EXAM
[Alert] : alert [Well Nourished] : well nourished [No Acute Distress] : no acute distress [Normal Pupil & Iris Size/Symmetry] : normal pupil and iris size and symmetry [Sclera Not Icteric] : sclera not icteric [Normal Nasal Mucosa] : the nasal mucosa was normal [No Neck Mass] : no neck mass was observed [No LAD] : no lymphadenopathy [Normal Rate and Effort] : normal respiratory rhythm and effort [Bilateral Audible Breath Sounds] : bilateral audible breath sounds [Normal Rate] : heart rate was normal  [Normal S1, S2] : normal S1 and S2 [Regular Rhythm] : with a regular rhythm [Soft] : abdomen soft [Normal Bowel Sounds] : normal bowel sounds [Not Distended] : not distended [Skin Intact] : skin intact  [Normal Outer Ear/Nose] : the ears and nose were normal in appearance [No Thrush] : no thrush [Pharyngeal erythema] : no pharyngeal erythema [Posterior Pharyngeal Cobblestoning] : no posterior pharyngeal cobblestoning [Clear Rhinorrhea] : no clear rhinorrhea was seen [Wheezing] : no wheezing was heard [No clubbing] : no clubbing [No Edema] : no edema [Normal Mood] : mood was normal [Normal Affect] : affect was normal [Alert, Awake, Oriented as Age-Appropriate] : alert, awake, oriented as age appropriate

## 2019-10-06 NOTE — REVIEW OF SYSTEMS
[Eye Itching] : itchy eyes [Rhinorrhea] : rhinorrhea [Post Nasal Drip] : post nasal drip [Nl] : Integumentary [Nasal Congestion] : nasal congestion [Sore Throat] : no sore throat [Joint Pains] : no arthralgias

## 2019-10-07 DIAGNOSIS — J30.89 OTHER ALLERGIC RHINITIS: ICD-10-CM

## 2019-10-07 DIAGNOSIS — J30.2 OTHER SEASONAL ALLERGIC RHINITIS: ICD-10-CM

## 2019-10-07 DIAGNOSIS — J45.909 UNSPECIFIED ASTHMA, UNCOMPLICATED: ICD-10-CM

## 2019-10-08 ENCOUNTER — TRANSCRIPTION ENCOUNTER (OUTPATIENT)
Age: 44
End: 2019-10-08

## 2019-10-08 ENCOUNTER — MESSAGE (OUTPATIENT)
Age: 44
End: 2019-10-08

## 2019-10-11 ENCOUNTER — OTHER (OUTPATIENT)
Age: 44
End: 2019-10-11

## 2019-10-14 ENCOUNTER — APPOINTMENT (OUTPATIENT)
Dept: ULTRASOUND IMAGING | Facility: CLINIC | Age: 44
End: 2019-10-14

## 2019-11-14 ENCOUNTER — APPOINTMENT (OUTPATIENT)
Dept: INTERNAL MEDICINE | Facility: CLINIC | Age: 44
End: 2019-11-14
Payer: MEDICAID

## 2019-11-14 VITALS
BODY MASS INDEX: 49.51 KG/M2 | DIASTOLIC BLOOD PRESSURE: 84 MMHG | OXYGEN SATURATION: 97 % | WEIGHT: 290 LBS | SYSTOLIC BLOOD PRESSURE: 131 MMHG | HEART RATE: 84 BPM | TEMPERATURE: 98.1 F | RESPIRATION RATE: 17 BRPM | HEIGHT: 64 IN

## 2019-11-14 VITALS — DIASTOLIC BLOOD PRESSURE: 82 MMHG | SYSTOLIC BLOOD PRESSURE: 130 MMHG

## 2019-11-14 DIAGNOSIS — R79.89 OTHER SPECIFIED ABNORMAL FINDINGS OF BLOOD CHEMISTRY: ICD-10-CM

## 2019-11-14 PROCEDURE — 99214 OFFICE O/P EST MOD 30 MIN: CPT

## 2019-11-19 LAB
ESTIMATED AVERAGE GLUCOSE: 120 MG/DL
HBA1C MFR BLD HPLC: 5.8 %

## 2019-11-20 ENCOUNTER — FORM ENCOUNTER (OUTPATIENT)
Age: 44
End: 2019-11-20

## 2019-11-21 ENCOUNTER — APPOINTMENT (OUTPATIENT)
Dept: ULTRASOUND IMAGING | Facility: CLINIC | Age: 44
End: 2019-11-21
Payer: MEDICAID

## 2019-11-21 ENCOUNTER — OUTPATIENT (OUTPATIENT)
Dept: OUTPATIENT SERVICES | Facility: HOSPITAL | Age: 44
LOS: 1 days | End: 2019-11-21
Payer: MEDICAID

## 2019-11-21 DIAGNOSIS — Z00.8 ENCOUNTER FOR OTHER GENERAL EXAMINATION: ICD-10-CM

## 2019-11-21 DIAGNOSIS — Z98.51 TUBAL LIGATION STATUS: Chronic | ICD-10-CM

## 2019-11-21 PROCEDURE — 76536 US EXAM OF HEAD AND NECK: CPT | Mod: 26

## 2019-11-21 PROCEDURE — 76536 US EXAM OF HEAD AND NECK: CPT

## 2020-02-18 ENCOUNTER — TRANSCRIPTION ENCOUNTER (OUTPATIENT)
Age: 45
End: 2020-02-18

## 2020-02-20 RX ORDER — FLUTICASONE PROPIONATE 110 UG/1
110 AEROSOL, METERED RESPIRATORY (INHALATION)
Qty: 1 | Refills: 2 | Status: DISCONTINUED | COMMUNITY
Start: 2017-06-08 | End: 2020-02-20

## 2020-02-21 ENCOUNTER — TRANSCRIPTION ENCOUNTER (OUTPATIENT)
Age: 45
End: 2020-02-21

## 2020-02-27 ENCOUNTER — APPOINTMENT (OUTPATIENT)
Dept: ENDOCRINOLOGY | Facility: HOSPITAL | Age: 45
End: 2020-02-27
Payer: MEDICAID

## 2020-02-27 ENCOUNTER — OUTPATIENT (OUTPATIENT)
Dept: OUTPATIENT SERVICES | Facility: HOSPITAL | Age: 45
LOS: 1 days | End: 2020-02-27
Payer: MEDICAID

## 2020-02-27 VITALS
WEIGHT: 285 LBS | HEIGHT: 64 IN | BODY MASS INDEX: 48.65 KG/M2 | DIASTOLIC BLOOD PRESSURE: 93 MMHG | SYSTOLIC BLOOD PRESSURE: 148 MMHG | HEART RATE: 88 BPM

## 2020-02-27 DIAGNOSIS — Z98.51 TUBAL LIGATION STATUS: Chronic | ICD-10-CM

## 2020-02-27 DIAGNOSIS — E34.9 ENDOCRINE DISORDER, UNSPECIFIED: ICD-10-CM

## 2020-02-27 LAB
T3 SERPL-MCNC: 136 NG/DL — SIGNIFICANT CHANGE UP (ref 80–200)
T4 FREE SERPL-MCNC: 1.6 NG/DL — SIGNIFICANT CHANGE UP (ref 0.9–1.8)
TSH SERPL-MCNC: 2.5 UIU/ML — SIGNIFICANT CHANGE UP (ref 0.27–4.2)

## 2020-02-27 PROCEDURE — 99204 OFFICE O/P NEW MOD 45 MIN: CPT | Mod: GC

## 2020-02-27 PROCEDURE — 84480 ASSAY TRIIODOTHYRONINE (T3): CPT

## 2020-02-27 PROCEDURE — G0463: CPT

## 2020-02-27 PROCEDURE — 84443 ASSAY THYROID STIM HORMONE: CPT

## 2020-02-27 PROCEDURE — 84439 ASSAY OF FREE THYROXINE: CPT

## 2020-02-28 DIAGNOSIS — E04.9 NONTOXIC GOITER, UNSPECIFIED: ICD-10-CM

## 2020-02-28 DIAGNOSIS — E66.01 MORBID (SEVERE) OBESITY DUE TO EXCESS CALORIES: ICD-10-CM

## 2020-02-28 DIAGNOSIS — I10 ESSENTIAL (PRIMARY) HYPERTENSION: ICD-10-CM

## 2020-02-28 DIAGNOSIS — E04.2 NONTOXIC MULTINODULAR GOITER: ICD-10-CM

## 2020-02-28 DIAGNOSIS — R73.09 OTHER ABNORMAL GLUCOSE: ICD-10-CM

## 2020-03-04 ENCOUNTER — RESULT REVIEW (OUTPATIENT)
Age: 45
End: 2020-03-04

## 2020-03-04 ENCOUNTER — OUTPATIENT (OUTPATIENT)
Dept: OUTPATIENT SERVICES | Facility: HOSPITAL | Age: 45
LOS: 1 days | End: 2020-03-04
Payer: MEDICAID

## 2020-03-04 ENCOUNTER — APPOINTMENT (OUTPATIENT)
Dept: ULTRASOUND IMAGING | Facility: IMAGING CENTER | Age: 45
End: 2020-03-04
Payer: MEDICAID

## 2020-03-04 DIAGNOSIS — Z98.51 TUBAL LIGATION STATUS: Chronic | ICD-10-CM

## 2020-03-04 DIAGNOSIS — E04.2 NONTOXIC MULTINODULAR GOITER: ICD-10-CM

## 2020-03-04 PROCEDURE — 10005 FNA BX W/US GDN 1ST LES: CPT | Mod: RT

## 2020-03-04 PROCEDURE — 88173 CYTOPATH EVAL FNA REPORT: CPT | Mod: 26

## 2020-03-04 PROCEDURE — 10006 FNA BX W/US GDN EA ADDL: CPT | Mod: LT

## 2020-03-04 PROCEDURE — 88172 CYTP DX EVAL FNA 1ST EA SITE: CPT

## 2020-03-04 PROCEDURE — 10005 FNA BX W/US GDN 1ST LES: CPT

## 2020-03-04 PROCEDURE — 88173 CYTOPATH EVAL FNA REPORT: CPT

## 2020-03-26 ENCOUNTER — APPOINTMENT (OUTPATIENT)
Dept: SURGERY | Facility: CLINIC | Age: 45
End: 2020-03-26
Payer: MEDICAID

## 2020-03-26 VITALS
DIASTOLIC BLOOD PRESSURE: 92 MMHG | HEIGHT: 64 IN | SYSTOLIC BLOOD PRESSURE: 138 MMHG | BODY MASS INDEX: 47.8 KG/M2 | HEART RATE: 100 BPM | WEIGHT: 280 LBS

## 2020-03-26 PROCEDURE — 99204 OFFICE O/P NEW MOD 45 MIN: CPT

## 2020-03-26 NOTE — REASON FOR VISIT
[Initial Consultation] : an initial consultation for [Family Member] : family member [FreeTextEntry2] : Thyroid carcinoma

## 2020-03-26 NOTE — CONSULT LETTER
[Dear  ___] : Dear  [unfilled], [Consult Letter:] : I had the pleasure of evaluating your patient, [unfilled]. [Please see my note below.] : Please see my note below. [Consult Closing:] : Thank you very much for allowing me to participate in the care of this patient.  If you have any questions, please do not hesitate to contact me. [Sincerely,] : Sincerely, [DrMarcia  ___] : Dr. BAILEY [FreeTextEntry2] : Dr. Estephania Scott, Dr. Neida England [FreeTextEntry3] : Matt Swanson MD, FACS\par System Director, Endocrine Surgery\par [facility]\par

## 2020-03-26 NOTE — ASSESSMENT
[FreeTextEntry1] : lengthy discussion regarding options for management including active surveillance within a formal study vs thyroid lobectomy with paratracheal node dissection, with or without frozen section vs total thyroidectomy with paratracheal node dissection.  risks, benefits and alternatives discussed at length.  I have discussed with the patient the anatomy of the area, the pathophysiology of the disease process and the rationale for surgery.  The attendant risks, possible complications and expected postoperative course have been discussed in detail.  I have given the patient the opportunity to ask questions, and all questions have been answered to the patient's satisfaction, and she wishes to proceed with total thyroidectomy. to be scheduled at [facility]  when suspension of elective surgeries due to Covid 19 has been rescinded.  to apply warm compresses to thyroid for treatment of tenderness, probably related to recent FNA\par \par

## 2020-03-26 NOTE — HISTORY OF PRESENT ILLNESS
[de-identified] : Pt c/o Thyroid nodules found on routine exam and some discomfort.  denies dysphagia, hoarseness, SOB or RT exposure\par sonogram:  Right 1.3 cm nodule, FNA positive for malignancy, left 3.3 cm nodule, FNA  benign, 1.2 cm and 1.1 cm nodules\par normal TFTs

## 2020-03-26 NOTE — PHYSICAL EXAM
[Laryngoscopy Performed] : laryngoscopy was performed, see procedure section for findings [Midline] : located in midline position [Normal] : orientation to person, place, and time: normal [de-identified] : 1.5 cm tender right thyroid nodule, left 3 cm nodule, well circumscribed and mobile [de-identified] : fiberoptic laryngoscopy shows normal vocal cord mobility bilaterally with no lesions noted

## 2020-05-21 ENCOUNTER — RX RENEWAL (OUTPATIENT)
Age: 45
End: 2020-05-21

## 2020-05-21 ENCOUNTER — TRANSCRIPTION ENCOUNTER (OUTPATIENT)
Age: 45
End: 2020-05-21

## 2020-05-22 ENCOUNTER — TRANSCRIPTION ENCOUNTER (OUTPATIENT)
Age: 45
End: 2020-05-22

## 2020-05-25 ENCOUNTER — TRANSCRIPTION ENCOUNTER (OUTPATIENT)
Age: 45
End: 2020-05-25

## 2020-06-30 ENCOUNTER — APPOINTMENT (OUTPATIENT)
Dept: MAMMOGRAPHY | Facility: CLINIC | Age: 45
End: 2020-06-30

## 2020-07-09 ENCOUNTER — NON-APPOINTMENT (OUTPATIENT)
Age: 45
End: 2020-07-09

## 2020-07-09 ENCOUNTER — APPOINTMENT (OUTPATIENT)
Dept: INTERNAL MEDICINE | Facility: CLINIC | Age: 45
End: 2020-07-09
Payer: COMMERCIAL

## 2020-07-09 VITALS
WEIGHT: 293 LBS | TEMPERATURE: 97.8 F | OXYGEN SATURATION: 95 % | SYSTOLIC BLOOD PRESSURE: 131 MMHG | RESPIRATION RATE: 16 BRPM | DIASTOLIC BLOOD PRESSURE: 87 MMHG | HEIGHT: 64 IN | HEART RATE: 73 BPM | BODY MASS INDEX: 50.02 KG/M2

## 2020-07-09 DIAGNOSIS — J30.2 OTHER SEASONAL ALLERGIC RHINITIS: ICD-10-CM

## 2020-07-09 DIAGNOSIS — Z11.59 ENCOUNTER FOR SCREENING FOR OTHER VIRAL DISEASES: ICD-10-CM

## 2020-07-09 DIAGNOSIS — Z01.419 ENCOUNTER FOR GYNECOLOGICAL EXAMINATION (GENERAL) (ROUTINE) W/OUT ABNORMAL FINDINGS: ICD-10-CM

## 2020-07-09 DIAGNOSIS — Z92.29 PERSONAL HISTORY OF OTHER DRUG THERAPY: ICD-10-CM

## 2020-07-09 PROCEDURE — 99396 PREV VISIT EST AGE 40-64: CPT | Mod: 25

## 2020-07-09 PROCEDURE — 93000 ELECTROCARDIOGRAM COMPLETE: CPT

## 2020-07-16 LAB
ALBUMIN SERPL ELPH-MCNC: 4.7 G/DL
ALP BLD-CCNC: 72 U/L
ALT SERPL-CCNC: 23 U/L
ANION GAP SERPL CALC-SCNC: 14 MMOL/L
AST SERPL-CCNC: 19 U/L
BASOPHILS # BLD AUTO: 0.06 K/UL
BASOPHILS NFR BLD AUTO: 0.8 %
BILIRUB SERPL-MCNC: 0.2 MG/DL
BUN SERPL-MCNC: 13 MG/DL
CALCIUM SERPL-MCNC: 9.7 MG/DL
CHLORIDE SERPL-SCNC: 97 MMOL/L
CHOLEST SERPL-MCNC: 165 MG/DL
CHOLEST/HDLC SERPL: 1.8 RATIO
CO2 SERPL-SCNC: 30 MMOL/L
CREAT SERPL-MCNC: 0.5 MG/DL
EOSINOPHIL # BLD AUTO: 0.11 K/UL
EOSINOPHIL NFR BLD AUTO: 1.5 %
ESTIMATED AVERAGE GLUCOSE: 120 MG/DL
GLUCOSE SERPL-MCNC: 84 MG/DL
HBA1C MFR BLD HPLC: 5.8 %
HCT VFR BLD CALC: 49.1 %
HCV AB SER QL: NONREACTIVE
HCV S/CO RATIO: 0.14 S/CO
HDLC SERPL-MCNC: 94 MG/DL
HGB BLD-MCNC: 14.6 G/DL
HIV1+2 AB SPEC QL IA.RAPID: NONREACTIVE
IMM GRANULOCYTES NFR BLD AUTO: 0.4 %
LDLC SERPL CALC-MCNC: 56 MG/DL
LYMPHOCYTES # BLD AUTO: 2.11 K/UL
LYMPHOCYTES NFR BLD AUTO: 28.1 %
MAN DIFF?: NORMAL
MCHC RBC-ENTMCNC: 25.9 PG
MCHC RBC-ENTMCNC: 29.7 GM/DL
MCV RBC AUTO: 87.2 FL
MONOCYTES # BLD AUTO: 0.37 K/UL
MONOCYTES NFR BLD AUTO: 4.9 %
NEUTROPHILS # BLD AUTO: 4.83 K/UL
NEUTROPHILS NFR BLD AUTO: 64.3 %
PLATELET # BLD AUTO: 260 K/UL
POTASSIUM SERPL-SCNC: 4.4 MMOL/L
PROT SERPL-MCNC: 7.5 G/DL
RBC # BLD: 5.63 M/UL
RBC # FLD: 14.2 %
SODIUM SERPL-SCNC: 141 MMOL/L
TRIGL SERPL-MCNC: 78 MG/DL
WBC # FLD AUTO: 7.51 K/UL

## 2020-07-29 ENCOUNTER — OUTPATIENT (OUTPATIENT)
Dept: OUTPATIENT SERVICES | Facility: HOSPITAL | Age: 45
LOS: 1 days | End: 2020-07-29

## 2020-07-29 VITALS
SYSTOLIC BLOOD PRESSURE: 146 MMHG | TEMPERATURE: 98 F | OXYGEN SATURATION: 97 % | RESPIRATION RATE: 14 BRPM | WEIGHT: 293 LBS | DIASTOLIC BLOOD PRESSURE: 68 MMHG | HEIGHT: 64 IN | HEART RATE: 74 BPM

## 2020-07-29 DIAGNOSIS — Z90.710 ACQUIRED ABSENCE OF BOTH CERVIX AND UTERUS: Chronic | ICD-10-CM

## 2020-07-29 DIAGNOSIS — Z98.51 TUBAL LIGATION STATUS: Chronic | ICD-10-CM

## 2020-07-29 DIAGNOSIS — C73 MALIGNANT NEOPLASM OF THYROID GLAND: ICD-10-CM

## 2020-07-29 NOTE — H&P PST ADULT - NEGATIVE FEMALE-SPECIFIC SYMPTOMS
no dysmenorrhea/no menorrhagia/no abnormal vaginal bleeding/no amenorrhea/no spotting/not applicable/no irregular menses/no vaginal discharge

## 2020-07-29 NOTE — H&P PST ADULT - ASSESSMENT
Problem: malignant neoplasm of thyroid gland     Assessment and Plan: Patient scheduled for surgery on 8/12/2020  Patient provided with verbal and written presurgical instructions; verbalized understanding  with teach back.    Patient provided with famotidine for GI prophylaxis; verbalized understanding.    Patient provided with Chlorhexidine wash, verbal and written instructions reviewed. Patient demonstrated understanding with teach back.   Patient provided with COVID reminder and instructions    STOP BANG score met, OR booking notified    Medical  evaluation requested by PST for elevated BP, in chart     Problem: Hypertension  Assessment and Plan: Patient instructed to take amlodipine on day of procedure, verbalized understanding.  Patient instructed to hold triamterene/ HTCZ on DOS, verbalized understanding

## 2020-07-29 NOTE — H&P PST ADULT - NSANTHOSAYNRD_GEN_A_CORE
criteria/No. SASHA screening performed.  STOP BANG Legend: 0-2 = LOW Risk; 3-4 = INTERMEDIATE Risk; 5-8 = HIGH Risk

## 2020-07-29 NOTE — H&P PST ADULT - NSICDXPASTSURGICALHX_GEN_ALL_CORE_FT
PAST SURGICAL HISTORY:  History of bilateral tubal ligation 17 years ago    Normal vaginal delivery     S/P hysterectomy 2018

## 2020-07-29 NOTE — H&P PST ADULT - NSICDXPASTMEDICALHX_GEN_ALL_CORE_FT
PAST MEDICAL HISTORY:  Asthma controlled meds denies any asthma attack    Hypertension     Morbid obesity with BMI of 45.0-49.9, adult     Osteoarthritis (arthritis due to wear and tear of joints) knees    Thyroid cancer     Uterine leiomyoma, unspecified location s/p hysterectomy

## 2020-07-29 NOTE — H&P PST ADULT - HISTORY OF PRESENT ILLNESS
44 y/o female with PMH HTN, asthma presents to PST with dx malignant neoplasm of thyroid gland now schedule for total thyroidectomy, paratracheal node dissection on 8/12/2020, patient reports nodules noted on routine PE, denies dysphagia or hoarseness, FNA right nodule indicated malignancy

## 2020-08-09 ENCOUNTER — APPOINTMENT (OUTPATIENT)
Dept: DISASTER EMERGENCY | Facility: CLINIC | Age: 45
End: 2020-08-09

## 2020-08-10 LAB — SARS-COV-2 N GENE NPH QL NAA+PROBE: NOT DETECTED

## 2020-08-11 ENCOUNTER — TRANSCRIPTION ENCOUNTER (OUTPATIENT)
Age: 45
End: 2020-08-11

## 2020-08-12 ENCOUNTER — APPOINTMENT (OUTPATIENT)
Dept: SURGERY | Facility: HOSPITAL | Age: 45
End: 2020-08-12

## 2020-08-12 ENCOUNTER — INPATIENT (INPATIENT)
Facility: HOSPITAL | Age: 45
LOS: 0 days | Discharge: HOME | End: 2020-08-13
Attending: SPECIALIST | Admitting: SPECIALIST
Payer: MEDICAID

## 2020-08-12 ENCOUNTER — RESULT REVIEW (OUTPATIENT)
Age: 45
End: 2020-08-12

## 2020-08-12 ENCOUNTER — TRANSCRIPTION ENCOUNTER (OUTPATIENT)
Age: 45
End: 2020-08-12

## 2020-08-12 VITALS
HEIGHT: 64 IN | WEIGHT: 293 LBS | SYSTOLIC BLOOD PRESSURE: 120 MMHG | TEMPERATURE: 98 F | HEART RATE: 88 BPM | DIASTOLIC BLOOD PRESSURE: 93 MMHG | RESPIRATION RATE: 17 BRPM | OXYGEN SATURATION: 96 %

## 2020-08-12 DIAGNOSIS — Z98.51 TUBAL LIGATION STATUS: Chronic | ICD-10-CM

## 2020-08-12 DIAGNOSIS — Z90.710 ACQUIRED ABSENCE OF BOTH CERVIX AND UTERUS: Chronic | ICD-10-CM

## 2020-08-12 PROBLEM — D25.9 LEIOMYOMA OF UTERUS, UNSPECIFIED: Chronic | Status: ACTIVE | Noted: 2018-08-29

## 2020-08-12 LAB
CALCIUM SERPL-MCNC: 9.8 MG/DL — SIGNIFICANT CHANGE UP (ref 8.4–10.5)
HCG UR QL: NEGATIVE — SIGNIFICANT CHANGE UP

## 2020-08-12 PROCEDURE — 88305 TISSUE EXAM BY PATHOLOGIST: CPT | Mod: 26

## 2020-08-12 PROCEDURE — 88307 TISSUE EXAM BY PATHOLOGIST: CPT | Mod: 26

## 2020-08-12 RX ORDER — CALCIUM CARBONATE 500(1250)
1 TABLET ORAL EVERY 6 HOURS
Refills: 0 | Status: DISCONTINUED | OUTPATIENT
Start: 2020-08-12 | End: 2020-08-13

## 2020-08-12 RX ORDER — OXYCODONE AND ACETAMINOPHEN 5; 325 MG/1; MG/1
1 TABLET ORAL EVERY 6 HOURS
Refills: 0 | Status: DISCONTINUED | OUTPATIENT
Start: 2020-08-12 | End: 2020-08-13

## 2020-08-12 RX ORDER — LORATADINE 10 MG/1
10 TABLET ORAL DAILY
Refills: 0 | Status: DISCONTINUED | OUTPATIENT
Start: 2020-08-12 | End: 2020-08-13

## 2020-08-12 RX ORDER — BENZOCAINE AND MENTHOL 5; 1 G/100ML; G/100ML
1 LIQUID ORAL
Refills: 0 | Status: DISCONTINUED | OUTPATIENT
Start: 2020-08-12 | End: 2020-08-13

## 2020-08-12 RX ORDER — FLUTICASONE PROPIONATE 50 MCG
1 SPRAY, SUSPENSION NASAL
Refills: 0 | Status: DISCONTINUED | OUTPATIENT
Start: 2020-08-12 | End: 2020-08-13

## 2020-08-12 RX ORDER — CALCIUM CARBONATE 500(1250)
1 TABLET ORAL
Qty: 0 | Refills: 0 | DISCHARGE
Start: 2020-08-12

## 2020-08-12 RX ORDER — FENTANYL CITRATE 50 UG/ML
50 INJECTION INTRAVENOUS
Refills: 0 | Status: DISCONTINUED | OUTPATIENT
Start: 2020-08-12 | End: 2020-08-12

## 2020-08-12 RX ORDER — CALCIUM GLUCONATE 100 MG/ML
1 VIAL (ML) INTRAVENOUS ONCE
Refills: 0 | Status: COMPLETED | OUTPATIENT
Start: 2020-08-12 | End: 2020-08-12

## 2020-08-12 RX ORDER — ALBUTEROL 90 UG/1
2 AEROSOL, METERED ORAL EVERY 6 HOURS
Refills: 0 | Status: DISCONTINUED | OUTPATIENT
Start: 2020-08-12 | End: 2020-08-13

## 2020-08-12 RX ORDER — SODIUM CHLORIDE 9 MG/ML
1000 INJECTION, SOLUTION INTRAVENOUS
Refills: 0 | Status: DISCONTINUED | OUTPATIENT
Start: 2020-08-12 | End: 2020-08-13

## 2020-08-12 RX ORDER — LEVOTHYROXINE SODIUM 125 MCG
112 TABLET ORAL DAILY
Refills: 0 | Status: DISCONTINUED | OUTPATIENT
Start: 2020-08-12 | End: 2020-08-13

## 2020-08-12 RX ORDER — AMLODIPINE BESYLATE 2.5 MG/1
5 TABLET ORAL DAILY
Refills: 0 | Status: DISCONTINUED | OUTPATIENT
Start: 2020-08-12 | End: 2020-08-13

## 2020-08-12 RX ORDER — FENTANYL CITRATE 50 UG/ML
25 INJECTION INTRAVENOUS
Refills: 0 | Status: DISCONTINUED | OUTPATIENT
Start: 2020-08-12 | End: 2020-08-12

## 2020-08-12 RX ORDER — ACETAMINOPHEN 500 MG
650 TABLET ORAL EVERY 6 HOURS
Refills: 0 | Status: DISCONTINUED | OUTPATIENT
Start: 2020-08-12 | End: 2020-08-13

## 2020-08-12 RX ORDER — TRIAMTERENE/HYDROCHLOROTHIAZID 75 MG-50MG
1 TABLET ORAL DAILY
Refills: 0 | Status: DISCONTINUED | OUTPATIENT
Start: 2020-08-12 | End: 2020-08-13

## 2020-08-12 RX ORDER — ACETAMINOPHEN 500 MG
2 TABLET ORAL
Qty: 0 | Refills: 0 | DISCHARGE
Start: 2020-08-12

## 2020-08-12 RX ORDER — HYDROMORPHONE HYDROCHLORIDE 2 MG/ML
0.5 INJECTION INTRAMUSCULAR; INTRAVENOUS; SUBCUTANEOUS
Refills: 0 | Status: DISCONTINUED | OUTPATIENT
Start: 2020-08-12 | End: 2020-08-13

## 2020-08-12 RX ORDER — KETOTIFEN FUMARATE 0.34 MG/ML
1 SOLUTION OPHTHALMIC EVERY 8 HOURS
Refills: 0 | Status: DISCONTINUED | OUTPATIENT
Start: 2020-08-12 | End: 2020-08-13

## 2020-08-12 RX ORDER — HYDROMORPHONE HYDROCHLORIDE 2 MG/ML
1 INJECTION INTRAMUSCULAR; INTRAVENOUS; SUBCUTANEOUS
Refills: 0 | Status: DISCONTINUED | OUTPATIENT
Start: 2020-08-12 | End: 2020-08-13

## 2020-08-12 RX ORDER — OXYCODONE HYDROCHLORIDE 5 MG/1
5 TABLET ORAL ONCE
Refills: 0 | Status: DISCONTINUED | OUTPATIENT
Start: 2020-08-12 | End: 2020-08-12

## 2020-08-12 RX ORDER — LEVOTHYROXINE SODIUM 125 MCG
1 TABLET ORAL
Qty: 30 | Refills: 2
Start: 2020-08-12 | End: 2020-11-09

## 2020-08-12 RX ORDER — ONDANSETRON 8 MG/1
4 TABLET, FILM COATED ORAL ONCE
Refills: 0 | Status: DISCONTINUED | OUTPATIENT
Start: 2020-08-12 | End: 2020-08-12

## 2020-08-12 RX ADMIN — FENTANYL CITRATE 50 MICROGRAM(S): 50 INJECTION INTRAVENOUS at 19:10

## 2020-08-12 RX ADMIN — Medication 100 GRAM(S): at 18:43

## 2020-08-12 RX ADMIN — HYDROMORPHONE HYDROCHLORIDE 1 MILLIGRAM(S): 2 INJECTION INTRAMUSCULAR; INTRAVENOUS; SUBCUTANEOUS at 21:30

## 2020-08-12 RX ADMIN — FENTANYL CITRATE 50 MICROGRAM(S): 50 INJECTION INTRAVENOUS at 19:44

## 2020-08-12 RX ADMIN — FENTANYL CITRATE 50 MICROGRAM(S): 50 INJECTION INTRAVENOUS at 19:00

## 2020-08-12 RX ADMIN — HYDROMORPHONE HYDROCHLORIDE 1 MILLIGRAM(S): 2 INJECTION INTRAMUSCULAR; INTRAVENOUS; SUBCUTANEOUS at 21:45

## 2020-08-12 RX ADMIN — FENTANYL CITRATE 50 MICROGRAM(S): 50 INJECTION INTRAVENOUS at 20:00

## 2020-08-12 RX ADMIN — SODIUM CHLORIDE 50 MILLILITER(S): 9 INJECTION, SOLUTION INTRAVENOUS at 20:15

## 2020-08-12 RX ADMIN — Medication 1 TABLET(S): at 23:41

## 2020-08-12 NOTE — DISCHARGE NOTE PROVIDER - NSDCCPCAREPLAN_GEN_ALL_CORE_FT
PRINCIPAL DISCHARGE DIAGNOSIS  Diagnosis: Thyroid neoplasm malignant  Assessment and Plan of Treatment:

## 2020-08-12 NOTE — BRIEF OPERATIVE NOTE - NSICDXBRIEFPROCEDURE_GEN_ALL_CORE_FT
PROCEDURES:  Excision, lymph nodes, paratracheal 12-Aug-2020 18:28:16  Jean Marie Nguyen  Total thyroidectomy 12-Aug-2020 18:28:02  Jean Marie Nguyen

## 2020-08-12 NOTE — CHART NOTE - NSCHARTNOTEFT_GEN_A_CORE
POST-OPERATIVE NOTE    Subjective: Pt is resting well in the PACU. Pt denies fever/chills, CP, SOB, nausea, and vomiting. Pt states she does not have trouble swallowing and only has mild incisional pain. Pt states she has not urinated yet but this is mainly because she has not had anything to drink.  Patient is s/p total thyroidectomy with paratracheal lymph node dissection and excision. Recovering appropriately.     Vital Signs Last 24 Hrs  T(C): 36.4 (12 Aug 2020 20:15), Max: 36.8 (12 Aug 2020 14:38)  T(F): 97.5 (12 Aug 2020 20:15), Max: 98.2 (12 Aug 2020 14:38)  HR: 100 (12 Aug 2020 21:00) (86 - 100)  BP: 156/96 (12 Aug 2020 21:00) (120/93 - 157/97)  BP(mean): 110 (12 Aug 2020 21:00) (92 - 112)  RR: 20 (12 Aug 2020 21:00) (16 - 20)  SpO2: 95% (12 Aug 2020 21:00) (95% - 97%)  I&O's Detail    12 Aug 2020 07:01  -  12 Aug 2020 22:04  --------------------------------------------------------  IN:    lactated ringers.: 100 mL    Oral Fluid: 200 mL  Total IN: 300 mL    OUT:  Total OUT: 0 mL    Total NET: 300 mL        amLODIPine   Tablet 5  triamterene 37.5 mG/hydrochlorothiazide 25 mG Tablet 1    PAST MEDICAL & SURGICAL HISTORY:  Thyroid cancer  Osteoarthritis (arthritis due to wear and tear of joints): knees  Uterine leiomyoma, unspecified location: s/p hysterectomy  Asthma: controlled meds denies any asthma attack  Morbid obesity with BMI of 45.0-49.9, adult  Hypertension  S/P hysterectomy: 2018  History of bilateral tubal ligation: 17 years ago  Normal vaginal delivery        Physical Exam:  General: NAD, resting comfortably in bed  Pulmonary: Nonlabored breathing, no respiratory distress  Cardiovascular: NSR  Abdominal: soft, NT/ND  HEENT: Dressings C/D/I. No hematoma present.      LABS:      Ca    9.8      12 Aug 2020 19:25        CAPILLARY BLOOD GLUCOSE          Radiology and Additional Studies:    Assessment:  The patient is a 45y Female who is now several hours post-op from a total thyroidectomy with paratracheal lymph node dissection and excision    Plan:  - Pain control as needed  - DVT ppx  - OOB and ambulating as tolerated  - Monitor Calcium levels    Vascular Surgery  [id number]

## 2020-08-12 NOTE — DISCHARGE NOTE PROVIDER - NSDCFUSCHEDAPPT_GEN_ALL_CORE_FT
ENEDINA GOETZ ; 08/25/2020 ; NPP [place] [facility]  ENEDINA GOETZ ; 08/27/2020 ; NPP Endocrin [address] ENEDINA Gaitan ; 09/18/2020 ; NPP [facility]  ENEDINA Huang ; 09/18/2020 ; NP Rad US  Adelfo Bailey ENEDINA GOETZ ; 08/25/2020 ; NPP [place] [address] [facility]  ENEDINA GOETZ ; 08/27/2020 ; NPP Endocrin [address] ENEDINA Gaitan ; 09/18/2020 ; NPP Brst Imag  ENEDINA Huang ; 09/18/2020 ; NP Rad US  Adelfo Bailey

## 2020-08-12 NOTE — DISCHARGE NOTE PROVIDER - CARE PROVIDER_API CALL
Matt Swanson  PLASTIC SURGERY  [address] [place] [address]  Phone: [phone]  Fax: [phone]  Follow Up Time:

## 2020-08-12 NOTE — PATIENT PROFILE ADULT - SURGICAL SITE DESCRIPTION
transverse neck Protocol For Photochemotherapy: Triamcinolone Ointment And Nbuvb: The patient received Photochemotherapy: Triamcinolone and NBUVB (triamcinolone ointment applied to all lesions prior to phototherapy).

## 2020-08-12 NOTE — DISCHARGE NOTE PROVIDER - NSDCCPTREATMENT_GEN_ALL_CORE_FT
PRINCIPAL PROCEDURE  Procedure: Total thyroidectomy with limited neck dissection  Findings and Treatment:

## 2020-08-12 NOTE — DISCHARGE NOTE PROVIDER - NSDCMRMEDTOKEN_GEN_ALL_CORE_FT
acetaminophen 325 mg oral tablet: 2 tab(s) orally every 6 hours, As needed, Moderate Pain (4 - 6)  amLODIPine 5 mg oral tablet: 1 tab(s) orally once a day  calcium carbonate 500 mg (200 mg elemental calcium) oral tablet, chewable: 1 tab(s) orally every 6 hours  Flonase 50 mcg/inh nasal spray: 1 spray(s) nasal once a day  ibuprofen 600 mg oral tablet: 1 tab(s) orally every 6 hours  ketotifen 0.025% ophthalmic solution: 1 drop(s) to each affected eye every 8 hours, As Needed  levothyroxine 112 mcg (0.112 mg) oral tablet: 1 tab(s) orally once a day   Multiple Vitamins oral capsule: 1 cap(s) orally once a day, last dose 8/5/2020  Omega-3 oral capsule: 1 tab(s) orally once a day LD 8/5/2020  Qvar Redihaler 80 mcg/inh inhalation aerosol: 1 puff(s) inhaled 2 times a day  triamterene-hydrochlorothiazide 37.5 mg-25 mg oral capsule: 1 cap(s) orally once a day  Ventolin HFA 90 mcg/inh inhalation aerosol: 2 puff(s) inhaled every 6 hours, As Needed  Vitamin C: 1 tab(s) orally once a day  Xyzal 5 mg oral tablet: 1 tab(s) orally once a day (in the evening)

## 2020-08-13 ENCOUNTER — TRANSCRIPTION ENCOUNTER (OUTPATIENT)
Age: 45
End: 2020-08-13

## 2020-08-13 VITALS
DIASTOLIC BLOOD PRESSURE: 91 MMHG | RESPIRATION RATE: 17 BRPM | SYSTOLIC BLOOD PRESSURE: 137 MMHG | OXYGEN SATURATION: 94 % | HEART RATE: 86 BPM | TEMPERATURE: 98 F

## 2020-08-13 DIAGNOSIS — R07.9 CHEST PAIN, UNSPECIFIED: ICD-10-CM

## 2020-08-13 LAB — CALCIUM SERPL-MCNC: 9.6 MG/DL — SIGNIFICANT CHANGE UP (ref 8.4–10.5)

## 2020-08-13 RX ADMIN — OXYCODONE AND ACETAMINOPHEN 1 TABLET(S): 5; 325 TABLET ORAL at 01:46

## 2020-08-13 RX ADMIN — Medication 1 TABLET(S): at 06:30

## 2020-08-13 RX ADMIN — Medication 112 MICROGRAM(S): at 05:17

## 2020-08-13 RX ADMIN — OXYCODONE AND ACETAMINOPHEN 1 TABLET(S): 5; 325 TABLET ORAL at 08:06

## 2020-08-13 RX ADMIN — SODIUM CHLORIDE 50 MILLILITER(S): 9 INJECTION, SOLUTION INTRAVENOUS at 06:30

## 2020-08-13 RX ADMIN — AMLODIPINE BESYLATE 5 MILLIGRAM(S): 2.5 TABLET ORAL at 06:29

## 2020-08-13 RX ADMIN — OXYCODONE AND ACETAMINOPHEN 1 TABLET(S): 5; 325 TABLET ORAL at 02:16

## 2020-08-13 NOTE — CHART NOTE - NSCHARTNOTEFT_GEN_A_CORE
CAPRINI SCORE [CLOT]    AGE RELATED RISK FACTORS                                                       MOBILITY RELATED FACTORS  [ x] Age 41-60 years                                            (1 Point)                  [ ] Bed rest                                                        (1 Point)  [ ] Age: 61-74 years                                           (2 Points)                 [ ] Plaster cast                                                   (2 Points)  [ ] Age= 75 years                                              (3 Points)                 [ ] Bed bound for more than 72 hours                 (2 Points)    DISEASE RELATED RISK FACTORS                                               GENDER SPECIFIC FACTORS  [ ] Edema in the lower extremities                       (1 Point)                  [ ] Pregnancy                                                     (1 Point)  [ ] Varicose veins                                               (1 Point)                  [ ] Post-partum < 6 weeks                                   (1 Point)             [ x] BMI > 25 Kg/m2                                            (1 Point)                  [ ] Hormonal therapy  or oral contraception          (1 Point)                 [ ] Sepsis (in the previous month)                        (1 Point)                  [ ] History of pregnancy complications                 (1 point)  [ ] Pneumonia or serious lung disease                                               [ ] Unexplained or recurrent                     (1 Point)           (in the previous month)                               (1 Point)  [ ] Abnormal pulmonary function test                     (1 Point)                 SURGERY RELATED RISK FACTORS  [ ] Acute myocardial infarction                              (1 Point)                 [ ]  Section                                             (1 Point)  [ ] Congestive heart failure (in the previous month)  (1 Point)               [ ] Minor surgery                                                  (1 Point)   [ ] Inflammatory bowel disease                             (1 Point)                 [ ] Arthroscopic surgery                                        (2 Points)  [ ] Central venous access                                      (2 Points)                [ ] General surgery lasting more than 45 minutes   (2 Points)       [ ] Stroke (in the previous month)                          (5 Points)               [ ] Elective arthroplasty                                         (5 Points)                                                                                                                                               HEMATOLOGY RELATED FACTORS                                                 TRAUMA RELATED RISK FACTORS  [ ] Prior episodes of VTE                                     (3 Points)                [ ] Fracture of the hip, pelvis, or leg                       (5 Points)  [ ] Positive family history for VTE                         (3 Points)                 [ ] Acute spinal cord injury (in the previous month)  (5 Points)  [ ] Prothrombin 07802 A                                     (3 Points)                 [ ] Paralysis  (less than 1 month)                             (5 Points)  [ ] Factor V Leiden                                             (3 Points)                  [ ] Multiple Trauma within 1 month                        (5 Points)  [ ] Lupus anticoagulants                                     (3 Points)                                                           [ ] Anticardiolipin antibodies                               (3 Points)                                                       [ ] High homocysteine in the blood                      (3 Points)                                             [ ] Other congenital or acquired thrombophilia      (3 Points)                                                [ ] Heparin induced thrombocytopenia                  (3 Points)                                      (x ) malignancy                                                    (2 points)    Total Score [    4      ]    Caprini Score 0 - 2:  Low Risk, No VTE Prophylaxis required for most patients, encourage ambulation  Caprini Score 3 - 6:  At Risk, pharmacologic VTE prophylaxis is indicated for most patients (in the absence of a contraindication)  Caprini Score Greater than or = 7:  High Risk, pharmacologic VTE prophylaxis is indicated for most patients (in the absence of a contraindication)

## 2020-08-13 NOTE — PROGRESS NOTE ADULT - SUBJECTIVE AND OBJECTIVE BOX
1 day s/p total thyroidectomy.  afebrile. no collections.  calcium level stable. negative chvostek's sign. now taking adequate po. NOAM removed. discharge home on synthroid and calcium.  f/u in office

## 2020-08-13 NOTE — DISCHARGE NOTE NURSING/CASE MANAGEMENT/SOCIAL WORK - PATIENT PORTAL LINK FT
You can access the FollowMyHealth Patient Portal offered by [facility] by registering at the following website: [email or web address]. By joining [employer]’s FollowMyHealth portal, you will also be able to view your health information using other applications (apps) compatible with our system.

## 2020-08-19 LAB — SURGICAL PATHOLOGY STUDY: SIGNIFICANT CHANGE UP

## 2020-08-25 ENCOUNTER — APPOINTMENT (OUTPATIENT)
Dept: SURGERY | Facility: CLINIC | Age: 45
End: 2020-08-25
Payer: MEDICAID

## 2020-08-25 PROCEDURE — 99024 POSTOP FOLLOW-UP VISIT: CPT

## 2020-08-25 NOTE — PHYSICAL EXAM
[Midline] : located in midline position [Normal] : orientation to person, place, and time: normal [de-identified] : well healed incision

## 2020-08-25 NOTE — ASSESSMENT
[FreeTextEntry1] : s/p Total thyroidectomy\par daily care\par Path reviewed\par bloods and KAHN per DR Scott\par f/u 6 weeks

## 2020-09-17 ENCOUNTER — APPOINTMENT (OUTPATIENT)
Dept: ENDOCRINOLOGY | Facility: HOSPITAL | Age: 45
End: 2020-09-17

## 2020-09-17 DIAGNOSIS — E66.9 OBESITY, UNSPECIFIED: ICD-10-CM

## 2020-09-18 ENCOUNTER — LABORATORY RESULT (OUTPATIENT)
Age: 45
End: 2020-09-18

## 2020-09-18 ENCOUNTER — APPOINTMENT (OUTPATIENT)
Dept: ULTRASOUND IMAGING | Facility: CLINIC | Age: 45
End: 2020-09-18
Payer: MEDICAID

## 2020-09-18 ENCOUNTER — RESULT REVIEW (OUTPATIENT)
Age: 45
End: 2020-09-18

## 2020-09-18 ENCOUNTER — OUTPATIENT (OUTPATIENT)
Dept: OUTPATIENT SERVICES | Facility: HOSPITAL | Age: 45
LOS: 1 days | End: 2020-09-18
Payer: MEDICAID

## 2020-09-18 ENCOUNTER — APPOINTMENT (OUTPATIENT)
Dept: MAMMOGRAPHY | Facility: CLINIC | Age: 45
End: 2020-09-18
Payer: MEDICAID

## 2020-09-18 DIAGNOSIS — Z98.51 TUBAL LIGATION STATUS: Chronic | ICD-10-CM

## 2020-09-18 DIAGNOSIS — Z90.710 ACQUIRED ABSENCE OF BOTH CERVIX AND UTERUS: Chronic | ICD-10-CM

## 2020-09-18 PROCEDURE — 77067 SCR MAMMO BI INCL CAD: CPT | Mod: 26

## 2020-09-18 PROCEDURE — 76641 ULTRASOUND BREAST COMPLETE: CPT

## 2020-09-18 PROCEDURE — 77067 SCR MAMMO BI INCL CAD: CPT

## 2020-09-18 PROCEDURE — 76641 ULTRASOUND BREAST COMPLETE: CPT | Mod: 26,50

## 2020-09-18 PROCEDURE — 77063 BREAST TOMOSYNTHESIS BI: CPT | Mod: 26

## 2020-09-18 PROCEDURE — 77063 BREAST TOMOSYNTHESIS BI: CPT

## 2020-09-28 NOTE — END OF VISIT
[] : Fellow [FreeTextEntry3] : Discussed case with Dr. Jimenes. Agree with plan as outlined above. Check TFTs and adjust dose of LT4 as needed. Will also proceed with KAHN.

## 2020-09-28 NOTE — DATA REVIEWED
[FreeTextEntry1] : EXAM:  US THYROID PARATHYROID   PROCEDURE DATE:  11/21/2019  \par FINDINGS:\par The thyroid gland is enlarged and diffusely heterogeneous. Multiple \par nodules are present.\par \par Right Lobe: 5.5 x 2.6 x 2.2 cm. There is a hypoechoic lobulated upper \par pole nodule measuring 1.3 x 0.8 x 0.8 cm. This nodule demonstrates an \par irregular contour and bulges the capsular surface. There is an adjacent \par solid midpole nodule measuring 0.6 x 0.6 x 0.5 cm.Occasional \par calcifications are present. Histologic sampling of these nodules is \par suggested to assure benignity\par \par Left Lobe: 5.5 x 2.5 x 3 cm. There is a well-defined homogeneous solid \par midpole nodule measuring 1.2 x 1.2 x 1.1 cm. There is a well-defined \par solid slightly hypoechoic midpole nodule measuring 1.1 x 0.9 x 0.6 cm. \par There is a dominant solid vascular lower pole nodule measuring 3.3 x 2.9 \par x 3.1 cm.\par \par Isthmus: 9 mm. There is a well-defined solid hypoechoic isthmic nodule \par measuring 1.4 x 0.8 x 1.1 cm.\par \par Cervical Lymph Nodes: No enlarged or abnormal morphology cervical nodes.\par There is a small nodular focus posterior to the upper pole of the right \par lobe of the thyroid gland measuring approximately 0.8 cm. Please \par correlate with serum calcium as an origin of parathyroid origin is not \par excluded.\par \par IMPRESSION: \par \par The thyroid gland is enlarged, heterogeneous and multinodular.\par There is a lobular heterogeneous nodule in the upper pole of the right \par lobe of thyroid gland with an adjacent additional solid component with \par occasional calcification. Histologic sampling is suggested to assure \par benignity.\par TI-RAD 4: Moderately suspicious (FNA if > 1.5 cm, Follow if > 1 cm)\par \par There is a dominant solid nodule of the left lobe with other nodular foci \par demonstrated.\par Suggest 6 month ultrasound follow-up of the thyroid gland.\par \par

## 2020-09-28 NOTE — REVIEW OF SYSTEMS
[All other systems negative] : All other systems negative [Fatigue] : no fatigue [Fever] : no fever [Chills] : no chills [Blurred Vision] : no blurred vision [Dysphagia] : no dysphagia [Neck Pain] : no neck pain [Dysphonia] : no dysphonia [Palpitations] : no palpitations [Nausea] : no nausea [Constipation] : no constipation [Vomiting] : no vomiting [Diarrhea] : no diarrhea

## 2020-09-28 NOTE — ASSESSMENT
[FreeTextEntry1] : 44 year old F with papillary thyroid cancer s/p total thyroidectomy \par \par Papillary thyroid cancer in setting of multinodular goiter:\par - Patient is clinically and biochemically eutthyroid\par - FNA of RUP nodule showed PTC classical variant (2.5cm) s/p total thyroidectomy 8/12/2020 ( Dr Swanson)\par - Patient currently on Synthroid 112mcg qam  started 8/12/2020\par - Patient at intermediate risk, TSH goal 0.1-0.5\par - Recommend obtain TSH, Free T4, TT3, TG, TGAb\par - Given N1a, will refer patient for KAHN treatment ( Discussed with Dr Gordon, treatment dose of 100mCi recommended)\par \par Prediabetes:\par - HbA1c 5.8% Nov 2019, continue with lifestyle changes. \par \par Obesity:\par - Counseled about diet, weight loss and exercise \par \par Essential HTN:\par - BP above goal <140/80, on anti-HTN meds, follow up with PCP\par \par RTC in 6 weeks \par Please obtain KAHN prior to next visit \par \par Discussed with Dr MORENO \par \par Jonathan Jimenes MD\par Endocrine Fellow

## 2020-09-28 NOTE — HISTORY OF PRESENT ILLNESS
[Home] : at home, [unfilled] , at the time of the visit. [Medical Office: ([facility])___] : at the medical office located in  [Verbal consent obtained from patient] : the patient, [unfilled] [FreeTextEntry1] : 45 year old woman called for follow up visit for papillary thyroid cancer. \par \par Patient was noted to have enlarged thyroid on routine examination at GYN office, was referred for thyroid US which showed multinodular goiter as below: \par Thyroid sono:\par RUP nodule: Hypoechoic lobulated measuring 1.3 x 0.8 x 0.8 cm, with irregular borders and bulges the capsular surface. RMP nodule solid 0.6 x 0.6 x 0.5 cm. Occasional calcifications are present. \par LMP solid nodule measuring 1.2 x 1.2 x 1.1 cm with smooth border. LMP solid slightly hypoechoic nodule measuring 1.1 x 0.9 x 0.6 cm. There is a dominant solid vascular lower pole nodule measuring 3.3 x 2.9 x 3.1 cm.\par Well-defined solid hypoechoic isthmic nodule measuring 1.4 x 0.8 x 1.1 cm.\par \par Patient with no prior history of any thyroid disorders. Does endorse mother with some thyroid disorder but not sure what type. No family history of thyroid cancer. No history of radiation to neck. No past use of amiodarone or lithium. No recent IV contrast studies. \par \par Since last visit in 2/2020, patient had FNA of left lower pole which was benign (category II) and RUP nodule which was positive for PTC.\par Patient underwent total thyroidectomy with Dr Swanson (8/12/2020). Report below.\par Patient currently on Synthroid 112mcg qam which she takes first this in the morning without other medication \par  \par Patient does not report dyspnea, dysphagia or dysphonia. Currently does not report any heat/ cold intolerance, no palpitation/ tremor, no constipation/ diarrhea, no hair loss / dry skin. She underwent hysterectomy 2 years back, prior to that menses were regular. Patient reports since starting Synthroid she feels more energetic. \par \par 2/2020 TSH 2.5, Free T4 1.6, TT3 136\par \par \par \par 1. Lymph node, paratracheal, excision\par - 1 lymph node positive for metastatic carcinoma with focal\par extranodal extension\par \par 2. Thyroid, left and right lobes, total thyroidectomy\par - Papillary thyroid carcinoma in right lobe and isthmus, see\par synoptic summary\par - Papillary thyroid microcarcinoma (0.3 cm) in isthmus\par - 1 lymph node positive for metastatic carcinoma with focal\par extranodal extension\par - Follicular adenoma (4.5 cm) in left lobe\par - Adenomatoid nodules\par  \par Procedure:  Total thyroidectomy\par Tumor Focality: Multifocal\par Tumor Site: Right lobe and isthmus\par Tumor Size: 2.5 x 1 x 0.5 cm\par Histologic Type:  Papillary thyroid carcinoma, classical variant\par Margins:  Uninvolved by carcinoma\par Distance of tumor from closest margin (millimeters): 0.5 mm from\par circumferential margin\par \par Angioinvasion (Vascular Invasion): Not identified\par Lymphatic Invasion:  Not identified\par Perineural Invasion:  Not identified\par Extrathyroidal Extension: Not identified\par \par Regional Lymph Nodes:\par Number of Lymph Nodes Involved: 2\par Level VI - pretracheal, paratracheal and prelaryngeal/[place],\par perithyroidal (central compartment dissection)\par Number of Lymph Nodes Examined: 2\par Level VI - pretracheal, paratracheal and prelaryngeal/[place]ph[place],\par perithyroidal (central compartment dissection)\par Size of Largest Metastatic Deposit (centimeters): 0.9 cm\par \par Extranodal Extension (NYA): Focally present\par \par Pathologic Staging (pTNM):\par TNM Descriptors: m\par Primary Tumor (pT): pT2\par Regional Lymph Nodes (pN): pN1a\par Distant Metastasis (pM): pMx\par Additional Pathologic Findings:  Papillary thyroid microcarcinoma\par (0.3 cm) in isthmus\par

## 2020-10-02 NOTE — H&P PST ADULT - SPO2 (%)
DIABETES        Tele-visit during coronavirus pandemic, patient unable to do video visit  Patient is at home and I am working out of my home  :[id number]  Reason for Visit:    AURELIANO SOLER is a(n) established patient here today for Diabetes, Dyslipidemia and Hypertension 72 y/o female with 29year hx of type 1 DM,on basal bolus insulin,orig see during admission for bronchitis in3/14 with RSV requiring steroids. Was getting vag yeast infections before. Never on oral agents.One month on diet alone.Min retin per dr Neves/ better.no neuropathy.Is on losartan and amlopidine.Simvastatin. No CAD.No FH of DM.Prob with low bg readings at various timesNo recent LOC. or needing help  History of Present Illness:      She reported: Frequent, small amounts of urine 1x. No polyuria.  History of diabetic hypoglycemia not severe.in a long time  A recent examination by an ophthalmologist Dr Neves- BDR andcataracts 12/19 Surgery  on hold,Bilat cataracts   home blood sugarr  Dropping slowly overnite- 147 avg 66% in range (>70-)GMI6.8 spikong pp L and dinner  urine protein was checked microalb/creat 13.6(<30).-5/19 and 25.9-8/20  Not feeling tired (fatigue) and no recent weight changes   blurry vision-cataracts  . No polydipsia.   No dizziness, no fainting, no tingling, no burning sensation in the distal extremities, and no numbness.  Cramps with walking at times,doesnt needto  stop -resolved  Cc with manfred on phone  Pt monitoring 6 times per day with finger stick glucose-omly occ with  semsor    BG readings:   Hemoglobin A1C (%)   Date Value   08/04/2020 7.4 (H)     Creatinine (mg/dL)   Date Value   08/04/2020 1.11 (H)     No results found for: MICALB  CHOLESTEROL (mg/dL)   Date Value   05/23/2019 111     HDL (mg/dL)   Date Value   05/23/2019 49 (L)     TRIGLYCERIDE (mg/dL)   Date Value   05/23/2019 83     CALCULATED LDL (mg/dL)   Date Value   05/23/2019 45     TSH (mcUnits/mL)   Date Value   02/18/2020 0.942     No components  found for: CMP  No components found for: BMP    Current medications:    Current Outpatient Medications   Medication Sig Last Dose   • losartan (COZAAR) 100 MG tablet TAKE 1 TABLET BY MOUTH DAILY    • amLODIPine (NORVASC) 5 MG tablet TAKE 1 TABLET BY MOUTH DAILY    • SPIRIVA RESPIMAT 2.5 MCG/ACT inhaler     • COMBIVENT RESPIMAT  MCG/ACT inhaler INHALE 1 PUFF BY MOUTH FOUR TIMES DAILY. MAXIMUM OF 6 PUFFS IN 24 HOURS    • FEROSUL 325 (65 Fe) MG tablet TAKE 1 TABLET BY MOUTH EVERY OTHER DAY WITH A SMALL GLASS OF ORANGE JUICE BEFORE BREAKFAST    • BHAVNA CONTOUR NEXT TEST test strip Test blood sugar 3 times daily as directed. E10.65 ON INSULIN    • insulin lispro (HUMALOG) 100 UNIT/ML injectable solution FILL PUMP  UNITS EVERY 3 DAYS    • busPIRone (BUSPAR) 10 MG tablet TAKE 1 TABLET BY MOUTH EVERY 12 HOURS    • atorvastatin (LIPITOR) 40 MG tablet TAKE 1 TABLET BY MOUTH AT BEDTIME    • DUREZOL 0.05 % ophthalmic emulsion INT 1 GTT KEITH BID. START 1 DAY BEFORE SURGERY    • moxifloxacin (VIGAMOX) 0.5 % ophthalmic solution START ONE DAY BEFORE PLACE 1 DROP INTO SURGICAL EYE TWICE DAILY    • ILEVRO 0.3 % ophthalmic suspension INT 1 GTT KEITH BID. START 1 DAY BEFORE SURGERY    • Continuous Blood Gluc  (DEXCOM G6 ) Device 1 Device continuous.    • Continuous Blood Gluc Sensor (DEXCOM G6 SENSOR) Misc 1 Device continuous. 1 new one every 3 months.    • Continuous Blood Gluc Transmit (DEXCOM G6 TRANSMITTER) Misc 1 each continuous.    • alendronate (FOSAMAX) 70 MG tablet Take 1 tablet by mouth every 7 days.    • montelukast (SINGULAIR) 10 MG tablet TAKE 1 TABLET BY MOUTH EVERY NIGHT    • traZODone (DESYREL) 50 MG tablet TAKE 1 TABLET BY MOUTH EVERY NIGHT AT BEDTIME    • Calcium Ascorbate 500 MG Tab     • fluticasone (FLONASE) 50 MCG/ACT nasal spray SW AND SPRAY 2 SPRAYS IEN QD    • albuterol-ipratropium 2.5 mg/0.5 mg (DUONEB) 0.5-2.5 (3) MG/3ML nebulizer solution U 3 ML VIA NEB Q 4 H PRF WHZ    • Vitamin D,  Ergocalciferol, 2000 units Cap     • aspirin 81 MG tablet Take 81 mg by mouth.      No current facility-administered medications for this visit.          Past Medical History:   Diagnosis Date   • Allergic rhinitis    • Anxiety    • COPD (chronic obstructive pulmonary disease) (CMS/[facility])    • Emphysema of lung (CMS/[facility])    • HLD (hyperlipidemia)    • HTN (hypertension)    • Insomnia    • Insulin dependent diabetes mellitus type IA (CMS/[facility])    • Lung nodules    • Vitamin D insufficiency        Past Surgical History:   Procedure Laterality Date   • Appendectomy     • Cath lab case     • Closed rx prox humerus fracture Right    • Tonsillectomy     • Tubal ligation     • [place] tooth extraction         No family history on file.    Social History     Tobacco Use   • Smoking status: Current Every Day Smoker     Packs/day: 0.50     Years: 50.00     Pack years: 25.00     Types: Cigarettes     Last attempt to quit: 2019     Years since quittin.2   • Smokeless tobacco: Never Used   • Tobacco comment: restarted 2020   Substance Use Topics   • Alcohol use: No     Frequency: Never           ALLERGIES:  Codeine and Sulfa antibiotics  No physical exam performed at this time  Review of Systems     Const: not feeling tired, no recent weight gain, no recent weight loss-  Allergy & Immunology: Normal.   Eyes:  blurred vision -occassionly- / cataracts  ENT: Normal.   CV: no chest pain, no lower extremity edema, no dyspnea on exertion   Resp: not expressed as feeling short of breath   Breast: Normal.   GI: no abdominal pain, no diarrhea, no constipation   : no change in urinary frequency   Endo: no polyuria, no polydipsia    Heme/Lymph: Normal.   Musc: no joint pain and Normal.   Neuro: no numbness, no tremors, not  feeling weak   Psych: Normal. -nodepression  Skin: Normal.     Vitals:   There were no vitals taken for this visit.   No physical examination performed at this time   Physical Exam      Constitutional: alert and in no acute distress.   Neck: no thyroid nodules and thyroid not enlarged.   Pulmonary: normal respiratory rate and effort. breath sounds clear to auscultation bilaterally.   Cardiovascular: normal rate, no murmurs were heard and regular rhythm. no right carotid bruit right dorsalis pedis 2+ no left carotid bruit left dorsalis pedis 2+ edema was not present in the lower extremities.   Abdomen: soft and nontender. no hepatomegaly and no splenomegaly.   Neurologic: Deep tendon reflexes: 0 right ankle jerk and 0 left ankle jerk.   Psychiatric: mood/affect were appropriate.   Skin, Hair, Nails: no foot ulcers and no skin ulcer was seen. no clubbing or cyanosis of the fingernails.   Diabetic Foot Exam:   Decreased sensation of both feet.   No ulcers observed.   Skin integrity intact.   No Calluses.   No Charcot.   No Onychomycosis           Diagnoses and all orders for this visit:    Essential hypertension    Vitamin D insufficiency    Hyperlipidemia, unspecified hyperlipidemia type    Type I (juvenile type) diabetes mellitus with renal manifestations, not stated as uncontrolled(250.41) (CMS/[facility])      No orders of the defined types were placed in this encounter.          Management Plan:           Last  A1c is 9.2 and 8.9-8.7 8.5 7.4 and 7.7, 7.5 and 7.4, and 7.1 and 7.3- 10/19 and 7.4 in 8/20  HDL 56 LDL 33-and HDL 49/LDL 45 -5/19continue atorvastatin 40 mgfollowup lipids  Microalbumin/creatinine ratio 2 2.9 now 13.6 (less than 30)-5/19 and microalb/cr]eat 25.9(<30)-8/20  BUN 20/creatinine 0.9,bun18/creat .94,bun35/creat 1.23.bun18/creat1.01 and BUN28/ Creat 1.17 and BUN24/ Creat 0.88 and WBC59Xhwqw6.11-8/20  Cc  7am dec to 1/7.5 ,and Dinner 5pm dec to 7.o  Vitamin D level high at 87-5/19 - to reduce dose of  VIT d to 2000 daily frpm 01801 onc e per week -follow-up vitamin D levels  TSH 0.9.2-2/20  Iron   on oral iron  Patient is using theDexcom G6 continuous glucose monitoring system  and 630-G insulin pump- pt was not aware sensor could not down load to pump  appt  3 months and 6 months  Per medicare  Pt has used  new pump insertion sites every  2 days  since starting insulin pump therapy with diminished insulin absorption by day # 3  Dec basal  overnite prior to cataract surgery            Steven Bettencourt MD     97

## 2020-10-06 ENCOUNTER — TRANSCRIPTION ENCOUNTER (OUTPATIENT)
Age: 45
End: 2020-10-06

## 2020-10-08 ENCOUNTER — APPOINTMENT (OUTPATIENT)
Dept: SURGERY | Facility: CLINIC | Age: 45
End: 2020-10-08
Payer: MEDICAID

## 2020-10-08 PROCEDURE — 99024 POSTOP FOLLOW-UP VISIT: CPT

## 2020-10-08 NOTE — ASSESSMENT
[FreeTextEntry1] : s/p Total thyroidectomy\par daily care\par blood work and scans per Endo\par f/u 4 months

## 2020-10-08 NOTE — HISTORY OF PRESENT ILLNESS
[de-identified] : Pt 2 months s/p Total thyroidectomy for malignancy doing well having KAHN next week feels well on Synthroid 125 mcg

## 2020-10-08 NOTE — PHYSICAL EXAM
[de-identified] : well healed scar [Midline] : located in midline position [Normal] : orientation to person, place, and time: normal

## 2020-10-19 ENCOUNTER — OUTPATIENT (OUTPATIENT)
Dept: OUTPATIENT SERVICES | Facility: HOSPITAL | Age: 45
LOS: 1 days | End: 2020-10-19
Payer: MEDICAID

## 2020-10-19 ENCOUNTER — APPOINTMENT (OUTPATIENT)
Dept: NUCLEAR MEDICINE | Facility: HOSPITAL | Age: 45
End: 2020-10-19
Payer: MEDICAID

## 2020-10-19 DIAGNOSIS — Z90.710 ACQUIRED ABSENCE OF BOTH CERVIX AND UTERUS: Chronic | ICD-10-CM

## 2020-10-19 DIAGNOSIS — E04.2 NONTOXIC MULTINODULAR GOITER: ICD-10-CM

## 2020-10-19 DIAGNOSIS — E66.01 MORBID (SEVERE) OBESITY DUE TO EXCESS CALORIES: ICD-10-CM

## 2020-10-19 DIAGNOSIS — Z98.51 TUBAL LIGATION STATUS: Chronic | ICD-10-CM

## 2020-10-19 DIAGNOSIS — E04.9 NONTOXIC GOITER, UNSPECIFIED: ICD-10-CM

## 2020-10-19 DIAGNOSIS — I10 ESSENTIAL (PRIMARY) HYPERTENSION: ICD-10-CM

## 2020-10-19 DIAGNOSIS — C73 MALIGNANT NEOPLASM OF THYROID GLAND: ICD-10-CM

## 2020-10-19 DIAGNOSIS — R73.09 OTHER ABNORMAL GLUCOSE: ICD-10-CM

## 2020-10-20 ENCOUNTER — APPOINTMENT (OUTPATIENT)
Dept: NUCLEAR MEDICINE | Facility: HOSPITAL | Age: 45
End: 2020-10-20

## 2020-10-21 ENCOUNTER — APPOINTMENT (OUTPATIENT)
Dept: NUCLEAR MEDICINE | Facility: HOSPITAL | Age: 45
End: 2020-10-21

## 2020-10-21 ENCOUNTER — RESULT REVIEW (OUTPATIENT)
Age: 45
End: 2020-10-21

## 2020-10-21 PROCEDURE — 79005 NUCLEAR RX ORAL ADMIN: CPT | Mod: 26

## 2020-10-27 ENCOUNTER — APPOINTMENT (OUTPATIENT)
Dept: NUCLEAR MEDICINE | Facility: HOSPITAL | Age: 45
End: 2020-10-27

## 2020-10-27 ENCOUNTER — RESULT REVIEW (OUTPATIENT)
Age: 45
End: 2020-10-27

## 2020-10-27 PROCEDURE — 78018 THYROID MET IMAGING BODY: CPT

## 2020-10-27 PROCEDURE — 78018 THYROID MET IMAGING BODY: CPT | Mod: 26

## 2020-10-27 PROCEDURE — 79005 NUCLEAR RX ORAL ADMIN: CPT

## 2020-10-27 PROCEDURE — 96372 THER/PROPH/DIAG INJ SC/IM: CPT

## 2020-10-27 PROCEDURE — 84702 CHORIONIC GONADOTROPIN TEST: CPT

## 2020-10-27 PROCEDURE — A9517: CPT

## 2020-11-25 ENCOUNTER — APPOINTMENT (OUTPATIENT)
Dept: PEDIATRIC ALLERGY IMMUNOLOGY | Facility: CLINIC | Age: 45
End: 2020-11-25
Payer: MEDICAID

## 2020-11-25 VITALS
BODY MASS INDEX: 49.51 KG/M2 | OXYGEN SATURATION: 97 % | SYSTOLIC BLOOD PRESSURE: 120 MMHG | DIASTOLIC BLOOD PRESSURE: 68 MMHG | WEIGHT: 290 LBS | HEART RATE: 77 BPM | TEMPERATURE: 36.3 F | HEIGHT: 64 IN

## 2020-11-25 PROCEDURE — 99072 ADDL SUPL MATRL&STAF TM PHE: CPT

## 2020-11-25 PROCEDURE — 99213 OFFICE O/P EST LOW 20 MIN: CPT

## 2020-11-30 NOTE — HISTORY OF PRESENT ILLNESS
[de-identified] : 45 year old female with asthma and allergic rhinitis who comes in for follow up.  The patient had been having to take Ventolin several times a day for the last three weeks, until the last week or so when it has been better. Now, for the last over two weeks, she has only used the rescue inhaler once a week or less.  This type of process seems to occur for Ms. Christianson in the spring and fall seasons for a couple of weeks, and then stop.  Winter is usually not a problem with respiratory symptoms. \par The patient was recently diagnosed with "thyroid cancer" had a resection and is now being treated with radioactive iodine, under the care of endocrinology. \par The patient does take allergy medications with no recent known symptoms except ocular pruritus.

## 2020-11-30 NOTE — PHYSICAL EXAM
[Alert] : alert [Well Nourished] : well nourished [Healthy Appearance] : healthy appearance [No Acute Distress] : no acute distress [Well Developed] : well developed [No Discharge] : no discharge [No Photophobia] : no photophobia [Sclera Not Icteric] : sclera not icteric [Normal Lips/Tongue] : the lips and tongue were normal [Normal Outer Ear/Nose] : the ears and nose were normal in appearance [Normal Tonsils] : normal tonsils [No Thrush] : no thrush [Boggy Nasal Turbinates] : boggy and/or pale nasal turbinates [Supple] : the neck was supple [Normal Rate and Effort] : normal respiratory rhythm and effort [No Crackles] : no crackles [No Retractions] : no retractions [Bilateral Audible Breath Sounds] : bilateral audible breath sounds [Normal Rate] : heart rate was normal  [Normal S1, S2] : normal S1 and S2 [No murmur] : no murmur [Regular Rhythm] : with a regular rhythm [Skin Intact] : skin intact  [No Rash] : no rash [No Skin Lesions] : no skin lesions [No clubbing] : no clubbing [No Edema] : no edema [No Cyanosis] : no cyanosis [Normal Mood] : mood was normal [Normal Affect] : affect was normal [Alert, Awake, Oriented as Age-Appropriate] : alert, awake, oriented as age appropriate [Wheezing] : no wheezing was heard [Eczematous Patches] : no eczematous patches [de-identified] : Obese [de-identified] : obese

## 2020-11-30 NOTE — REVIEW OF SYSTEMS
[Nl] : Psychiatric [Urticaria] : no urticaria [Swelling] : no swelling [FreeTextEntry3] : see HPI [FreeTextEntry4] : see HPI [FreeTextEntry6] : see HPI [de-identified] : see HPI

## 2020-12-03 ENCOUNTER — APPOINTMENT (OUTPATIENT)
Dept: ENDOCRINOLOGY | Facility: HOSPITAL | Age: 45
End: 2020-12-03
Payer: MEDICAID

## 2020-12-03 ENCOUNTER — OUTPATIENT (OUTPATIENT)
Dept: OUTPATIENT SERVICES | Facility: HOSPITAL | Age: 45
LOS: 1 days | End: 2020-12-03
Payer: MEDICAID

## 2020-12-03 VITALS
SYSTOLIC BLOOD PRESSURE: 135 MMHG | DIASTOLIC BLOOD PRESSURE: 80 MMHG | WEIGHT: 290 LBS | BODY MASS INDEX: 49.51 KG/M2 | HEART RATE: 93 BPM | TEMPERATURE: 96.9 F | HEIGHT: 64 IN

## 2020-12-03 DIAGNOSIS — Z90.710 ACQUIRED ABSENCE OF BOTH CERVIX AND UTERUS: Chronic | ICD-10-CM

## 2020-12-03 DIAGNOSIS — E06.9 THYROIDITIS, UNSPECIFIED: ICD-10-CM

## 2020-12-03 DIAGNOSIS — Z98.51 TUBAL LIGATION STATUS: Chronic | ICD-10-CM

## 2020-12-03 LAB
T4 FREE SERPL-MCNC: 1.2 NG/DL — SIGNIFICANT CHANGE UP (ref 0.9–1.8)
THYROGLOB AB FLD IA-ACNC: 4363 IU/ML — HIGH
THYROGLOB AB SERPL-ACNC: 4363 IU/ML — HIGH
THYROGLOB SERPL-MCNC: <0.2 NG/ML — LOW (ref 1.6–59.9)
TSH SERPL-MCNC: 42.8 UIU/ML — HIGH (ref 0.27–4.2)

## 2020-12-03 PROCEDURE — 84443 ASSAY THYROID STIM HORMONE: CPT

## 2020-12-03 PROCEDURE — 86800 THYROGLOBULIN ANTIBODY: CPT

## 2020-12-03 PROCEDURE — 84439 ASSAY OF FREE THYROXINE: CPT

## 2020-12-03 PROCEDURE — G0463: CPT

## 2020-12-03 PROCEDURE — 99214 OFFICE O/P EST MOD 30 MIN: CPT | Mod: GC

## 2020-12-03 PROCEDURE — 84432 ASSAY OF THYROGLOBULIN: CPT

## 2020-12-03 NOTE — REVIEW OF SYSTEMS
[All other systems negative] : All other systems negative [Lower Ext Edema] : lower extremity edema [Fatigue] : no fatigue [Fever] : no fever [Chills] : no chills [Blurred Vision] : no blurred vision [Dysphagia] : no dysphagia [Neck Pain] : no neck pain [Dysphonia] : no dysphonia [Chest Pain] : no chest pain [Palpitations] : no palpitations [Shortness Of Breath] : no shortness of breath [Nausea] : no nausea [Constipation] : no constipation [Vomiting] : no vomiting [Diarrhea] : no diarrhea [Tremors] : no tremors

## 2020-12-03 NOTE — PHYSICAL EXAM
[Alert] : alert [Healthy Appearance] : healthy appearance [Obese] : obese [EOMI] : extra ocular movement intact [Normal Hearing] : hearing was normal [No Neck Mass] : no neck mass was observed [Thyroid Not Enlarged] : the thyroid was not enlarged [Well Healed Scar] : well healed scar [No Respiratory Distress] : no respiratory distress [Normal S1, S2] : normal S1 and S2 [Regular Rhythm] : with a regular rhythm [No Edema] : no peripheral edema [Not Tender] : non-tender [Not Distended] : not distended [Soft] : abdomen soft [Normal Gait] : normal gait [No Motor Deficits] : the motor exam was normal [Normal Reflexes] : deep tendon reflexes were 2+ and symmetric [No Tremors] : no tremors [Oriented x3] : oriented to person, place, and time [Normal Affect] : the affect was normal [Normal Mood] : the mood was normal [de-identified] : nontender

## 2020-12-03 NOTE — HISTORY OF PRESENT ILLNESS
[Home] : at home, [unfilled] , at the time of the visit. [Medical Office: ([facility])___] : at the medical office located in  [Verbal consent obtained from patient] : the patient, [unfilled] [FreeTextEntry1] : 44 yo F presented for follow up visit for papillary thyroid cancer. \par \par Patient was noted to have enlarged thyroid on routine examination at GYN office, was referred for thyroid US which showed multinodular goiter as below: \par Thyroid sono:\par RUP nodule: Hypoechoic lobulated measuring 1.3 x 0.8 x 0.8 cm, with irregular borders and bulges the capsular surface. RMP nodule solid 0.6 x 0.6 x 0.5 cm. Occasional calcifications are present. \par LMP solid nodule measuring 1.2 x 1.2 x 1.1 cm with smooth border. LMP solid slightly hypoechoic nodule measuring 1.1 x 0.9 x 0.6 cm. There is a dominant solid vascular lower pole nodule measuring 3.3 x 2.9 x 3.1 cm.\par Well-defined solid hypoechoic isthmic nodule measuring 1.4 x 0.8 x 1.1 cm.\par \par Patient with no prior history of any thyroid disorders. Does endorse mother with some thyroid disorder but not sure what type. No family history of thyroid cancer. No history of radiation to neck. No past use of amiodarone or lithium. No recent IV contrast studies. \par \par 2/2020 TSH 2.5, Free T4 1.6, TT3 136\par 3/2020, FNA of left lower pole which was benign ([place] II) and RUP nodule which was positive for PTC ([place] VI).\par Patient underwent total thyroidectomy with Dr Swanson (8/12/2020). Pathology showed 2.5cm right PTC classical variant with 0.3cm microcarcinoma in isthmus, no ETE with 2 LN positive both LV VI, largest measuring 0.9cm. Report below.\par Patient was started on LT4 112mcg qam.\par 9/2020 TSH 32.9, Free T4 1.1, TT3 69. TG 0.2, TGAB 4598 Synthroid increased to 125mcg qam. \par She underwent KAHN on 10/20. Stimulated TSH 84.3, TG <0.2 and TG AB 5552\par  \par Patient does not report dyspnea, dysphagia or dysphonia. Currently does not report any heat/ cold intolerance, no palpitation/ tremor, no constipation/ diarrhea, no hair loss / dry skin. She underwent hysterectomy 2 years back, prior to that menses were regular. \par \par \par \par \par \par 1. Lymph node, paratracheal, excision\par - 1 lymph node positive for metastatic carcinoma with focal\par extranodal extension\par \par 2. Thyroid, left and right lobes, total thyroidectomy\par - Papillary thyroid carcinoma in right lobe and isthmus, see\par synoptic summary\par - Papillary thyroid microcarcinoma (0.3 cm) in isthmus\par - 1 lymph node positive for metastatic carcinoma with focal\par extranodal extension\par - Follicular adenoma (4.5 cm) in left lobe\par - Adenomatoid nodules\par  \par Procedure:  Total thyroidectomy\par Tumor Focality: Multifocal\par Tumor Site: Right lobe and isthmus\par Tumor Size: 2.5 x 1 x 0.5 cm\par Histologic Type:  Papillary thyroid carcinoma, classical variant\par Margins:  Uninvolved by carcinoma\par Distance of tumor from closest margin (millimeters): 0.5 mm from\par circumferential margin\par \par Angioinvasion (Vascular Invasion): Not identified\par Lymphatic Invasion:  Not identified\par Perineural Invasion:  Not identified\par Extrathyroidal Extension: Not identified\par \par Regional Lymph Nodes:\par Number of Lymph Nodes Involved: 2\par Level VI - pretracheal, paratracheal and prelaryngeal/Delphian,\par perithyroidal (central compartment dissection)\par Number of Lymph Nodes Examined: 2\par Level VI - pretracheal, paratracheal and prelaryngeal/[place]phian,\par perithyroidal (central compartment dissection)\par Size of Largest Metastatic Deposit (centimeters): 0.9 cm\par \par Extranodal Extension (NYA): Focally present\par \par Pathologic Staging (pTNM):\par TNM Descriptors: m\par Primary Tumor (pT): pT2\par Regional Lymph Nodes (pN): pN1a\par Distant Metastasis (pM): pMx\par Additional Pathologic Findings:  Papillary thyroid microcarcinoma\par (0.3 cm) in isthmus\par

## 2020-12-03 NOTE — ASSESSMENT
[FreeTextEntry1] : 45 year old F presented for followup of papillary thyroid cancer s/p total thyroidectomy \par \par Papillary thyroid cancer in setting of multinodular goiter:\par - Patient is clinically and biochemically euthyroid\par - s/p total thyroidectomy 8/12/2020 ([facility]). Pathology showed 2.5cm right PTC classical variant with 0.3cm microcarcinoma in isthmus, no ETE with 2 LN positive both LV VI, largest measuring 0.9cm.\par - s/p KAHN 10/20 showed undectable TG in presence of elevated TG AB\par - Patient currently on Synthroid 125mcg qam  \par - Patient at intermediate risk, TSH goal 0.1-0.5\par - Recommend obtain TSH, Free T4, TG, TGAb, and TG mass spec given severely elevated TGAB to assess for falsely lower TG\par - Please obtain thyroid sono \par \par Prediabetes:\par - HbA1c 5.8% 7/2020 , continue with lifestyle changes. \par \par Obesity:\par - Counseled about diet, weight loss and exercise \par \par Essential HTN:\par - BP above goal <140/80, on anti-HTN meds, follow up with PCP\par \par RTC in 3months \par Discussed with Dr Miles\par Patient will follow up in [address] [place] [address] Office, information given to patient to schedule appt  \par \par Jonathan Jimenes MD\par Endocrine Fellow

## 2020-12-03 NOTE — END OF VISIT
[] : Fellow [FreeTextEntry3] : Patient is a 45-year-old female, here to follow-up for papillary thyroid cancer.  Patient underwent total thyroidectomy with Dr. Swanson in 8/12/2020.  Pathology showing 2.5 cm right PTC, calcific ovarian with a 0.3 cm microcarcinoma in the isthmus.  No extrathyroidal extension with 2 lymph node positive.  Largest measuring lymph node was 0.9 cm.  Patient underwent radioactive iodine ablation in October 2020, stimulated TSH of 84.3, thyroglobulin <0.2 and thyroglobulin antibody 5552.\par Today we will repeat thyroglobulin level, thyroglobulin mass spectrometry, as well as TSH level.\par TSH goal of 0.1-0.5.  Secondary to intermediate risk.  We will adjust TSH goal level based on biochemical and structural response to treatment.\par To continue follow-up with primary care doctor regarding prediabetes and obesity.  Diet and exercise as discussed.

## 2020-12-04 DIAGNOSIS — C73 MALIGNANT NEOPLASM OF THYROID GLAND: ICD-10-CM

## 2020-12-04 DIAGNOSIS — R73.03 PREDIABETES: ICD-10-CM

## 2020-12-04 DIAGNOSIS — R73.09 OTHER ABNORMAL GLUCOSE: ICD-10-CM

## 2020-12-10 ENCOUNTER — RESULT REVIEW (OUTPATIENT)
Age: 45
End: 2020-12-10

## 2020-12-10 ENCOUNTER — APPOINTMENT (OUTPATIENT)
Dept: ULTRASOUND IMAGING | Facility: CLINIC | Age: 45
End: 2020-12-10
Payer: MEDICAID

## 2020-12-10 ENCOUNTER — OUTPATIENT (OUTPATIENT)
Dept: OUTPATIENT SERVICES | Facility: HOSPITAL | Age: 45
LOS: 1 days | End: 2020-12-10
Payer: MEDICAID

## 2020-12-10 DIAGNOSIS — Z98.51 TUBAL LIGATION STATUS: Chronic | ICD-10-CM

## 2020-12-10 DIAGNOSIS — C73 MALIGNANT NEOPLASM OF THYROID GLAND: ICD-10-CM

## 2020-12-10 DIAGNOSIS — Z90.710 ACQUIRED ABSENCE OF BOTH CERVIX AND UTERUS: Chronic | ICD-10-CM

## 2020-12-10 LAB
THYROGLOBULIN, MASS SPEC, SERUM INTERPRETATION: SIGNIFICANT CHANGE UP
THYROGLOBULIN, MASS SPEC, SERUM RESULT: 0.6 NG/ML — HIGH

## 2020-12-10 PROCEDURE — 76536 US EXAM OF HEAD AND NECK: CPT | Mod: 26

## 2020-12-10 PROCEDURE — 76536 US EXAM OF HEAD AND NECK: CPT

## 2020-12-11 ENCOUNTER — NON-APPOINTMENT (OUTPATIENT)
Age: 45
End: 2020-12-11

## 2020-12-21 ENCOUNTER — RX RENEWAL (OUTPATIENT)
Age: 45
End: 2020-12-21

## 2020-12-21 PROBLEM — Z01.419 ENCOUNTER FOR CERVICAL PAP SMEAR WITH PELVIC EXAM: Status: RESOLVED | Noted: 2018-05-01 | Resolved: 2020-12-21

## 2021-01-28 ENCOUNTER — OUTPATIENT (OUTPATIENT)
Dept: OUTPATIENT SERVICES | Facility: HOSPITAL | Age: 46
LOS: 1 days | End: 2021-01-28
Payer: MEDICAID

## 2021-01-28 ENCOUNTER — APPOINTMENT (OUTPATIENT)
Dept: OBGYN | Facility: CLINIC | Age: 46
End: 2021-01-28
Payer: MEDICAID

## 2021-01-28 VITALS — TEMPERATURE: 97.1 F

## 2021-01-28 VITALS — WEIGHT: 293 LBS | BODY MASS INDEX: 53.21 KG/M2 | SYSTOLIC BLOOD PRESSURE: 144 MMHG | DIASTOLIC BLOOD PRESSURE: 100 MMHG

## 2021-01-28 DIAGNOSIS — Z90.710 ACQUIRED ABSENCE OF BOTH CERVIX AND UTERUS: Chronic | ICD-10-CM

## 2021-01-28 DIAGNOSIS — Z01.419 ENCOUNTER FOR GYNECOLOGICAL EXAMINATION (GENERAL) (ROUTINE) WITHOUT ABNORMAL FINDINGS: ICD-10-CM

## 2021-01-28 DIAGNOSIS — Z98.51 TUBAL LIGATION STATUS: Chronic | ICD-10-CM

## 2021-01-28 DIAGNOSIS — N76.0 ACUTE VAGINITIS: ICD-10-CM

## 2021-01-28 PROCEDURE — 99396 PREV VISIT EST AGE 40-64: CPT | Mod: NC

## 2021-01-28 PROCEDURE — G0463: CPT

## 2021-01-29 NOTE — DISCUSSION/SUMMARY
[FreeTextEntry1] : 44 y/o  s/p total hysterectomy, here for annual well visit. \par \par #Annual\par - no pap needed, benign disease hysterectomy\par -mammo ordered  \par - GI referral for colonoscopy \par -counseled patient on importance of weight loss, considering bariatric surgery options \par -pt will f/u with endo based on TSH results \par \par MARIELA Perkins\par Pt seen with JEANETTE Farmer

## 2021-01-29 NOTE — PHYSICAL EXAM
[Alert] : alert [No Acute Distress] : no acute distress [Regular Rate Rhythm] : regular rate rhythm [Clear to Auscultation B/L] : clear to auscultation bilaterally [Soft] : soft [Non-tender] : non-tender [No Mass] : no mass [Oriented x3] : oriented x3 [Examination Of The Breasts] : a normal appearance [No Discharge] : no discharge [No Masses] : no breast masses were palpable [Labia Majora] : normal [Labia Minora] : normal [Normal] : normal [Pink Rugae] : pink rugae [Discharge] : a  ~M vaginal discharge was present [No Bleeding] : There was no active vaginal bleeding [Absent] : absent [No Tenderness] : no tenderness [Nl Sphincter Tone] : normal sphincter tone [FreeTextEntry3] : s/p total thyroidectomy

## 2021-02-23 ENCOUNTER — APPOINTMENT (OUTPATIENT)
Dept: SURGERY | Facility: CLINIC | Age: 46
End: 2021-02-23

## 2021-04-05 ENCOUNTER — APPOINTMENT (OUTPATIENT)
Dept: DISASTER EMERGENCY | Facility: OTHER | Age: 46
End: 2021-04-05
Payer: MEDICAID

## 2021-04-05 PROCEDURE — 0001A: CPT

## 2021-04-06 ENCOUNTER — APPOINTMENT (OUTPATIENT)
Dept: ENDOCRINOLOGY | Facility: CLINIC | Age: 46
End: 2021-04-06
Payer: MEDICAID

## 2021-04-06 VITALS
BODY MASS INDEX: 53.73 KG/M2 | HEART RATE: 82 BPM | WEIGHT: 293 LBS | DIASTOLIC BLOOD PRESSURE: 90 MMHG | TEMPERATURE: 97.9 F | SYSTOLIC BLOOD PRESSURE: 130 MMHG | OXYGEN SATURATION: 95 %

## 2021-04-06 PROCEDURE — 99204 OFFICE O/P NEW MOD 45 MIN: CPT

## 2021-04-06 PROCEDURE — 99072 ADDL SUPL MATRL&STAF TM PHE: CPT

## 2021-04-06 NOTE — ASSESSMENT
[FreeTextEntry1] : \par 45 year old F presented for followup of papillary thyroid cancer s/p total thyroidectomy \par \par 1. Papillary thyroid cancer in setting of multinodular goiter:\par - Patient is clinically and biochemically euthyroid\par - s/p total thyroidectomy 8/12/2020 ([facility]). Pathology showed 2.5cm right PTC classical variant with 0.3cm microcarcinoma in isthmus, no ETE with 2 LN positive both LV VI, largest measuring 0.9cm.\par - s/p KAHN 10/20 showed un-dectable TG in presence of elevated TG AB\par - TSH was 9.56 in Jan 28th, 2021, free T4 1.6\par - Patient at intermediate risk, TSH goal 0.1-0.5\par - Recommend obtain TSH, Free T4, TG, TGAb, and TG mass spec given severely elevated TGAB to assess for falsely lower TG\par - US in Dec 2020 showing: \par Multiple abnormal lymph nodes are seen involving the right neck. Representative lymph node right level 3 position measures 1.5 x 1.7 x 1.0 cm in size with an adjacent lymph node. Small echogenic foci seen suggesting microcalcifications. \par On left neck there is a small 1.0cm lymph node with questionable significance.  \par -We will obtain thyroglobulin mass back, TSH level.  Consider fine-needle aspiration of the right level 3 lymph node that is measuring 1.5 x 1.7 x 1.0 with small echogenic foci.\par  \par 2. Prediabetes:\par - HbA1c 5.8% 7/2020 , continue with lifestyle changes. \par - Will continue to work on diet and exercise.\par \par 3. Obesity:\par - Counseled about diet, weight loss and exercise \par -Patient is considering bariatric surgical intervention.  We will continue this discussion.\par \par 4. Essential HTN:\par - BP today 130/90\par - Continue with current regimen \par - Continue with Triamterene -HCTZ 37.5-25\par - Amlodipine 5mg once daily.

## 2021-04-06 NOTE — DATA REVIEWED
[FreeTextEntry1] :  EXAM:  US THYROID ONLY\par \par PROCEDURE DATE:  12/10/2020\par \par INTERPRETATION:  CLINICAL INFORMATION: History of total thyroidectomy with recent nuclear medicine scan demonstrating avid tissue uptake anterior neck\par \par COMPARISON: Nuclear medicine exam dated 10/20\par \par TECHNIQUE: Sonography of the thyroid.\par \par FINDINGS:\par Pre and post thyroidectomy beds are intact.\par \par Cervical Lymph Nodes: Multiple abnormal lymph nodes are seen involving the right neck. Representative lymph node right level 3 position measures 1.5 x 1.7 x 1.0 cm in size with an adjacent lymph node. Small echogenic foci seen suggesting microcalcifications.\par \par The left neck and post thyroidectomy shows a small 1.0 cm lymph node questionable significance\par \par IMPRESSION:\par \par Abnormal right level 3 lymph nodes . If needed this is amenable to ultrasound-guided biopsy

## 2021-04-06 NOTE — HISTORY OF PRESENT ILLNESS
[FreeTextEntry1] : Patient is a 45-year-old woman here to establish care for papillary thyroid cancer, obesity, prediabetes.\par \par Patient was noted to have enlarged thyroid on routine examination at GYN office, was referred for thyroid US which showed multinodular goiter as below: \par Thyroid sono:\par RUP nodule: Hypoechoic lobulated measuring 1.3 x 0.8 x 0.8 cm, with irregular borders and bulges the capsular surface. RMP nodule solid 0.6 x 0.6 x 0.5 cm. Occasional calcifications are present. \par LMP solid nodule measuring 1.2 x 1.2 x 1.1 cm with smooth border. LMP solid slightly hypoechoic nodule measuring 1.1 x 0.9 x 0.6 cm. There is a dominant solid vascular lower pole nodule measuring 3.3 x 2.9 x 3.1 cm.\par Well-defined solid hypoechoic isthmic nodule measuring 1.4 x 0.8 x 1.1 cm.\par \par Patient with no prior history of any thyroid disorders. Does endorse mother with some thyroid disorder but not sure what type. No family history of thyroid cancer. No history of radiation to neck. No past use of amiodarone or lithium. No recent IV contrast studies. \par \par 2/2020 TSH 2.5, Free T4 1.6, TT3 136\par \par 3/2020, FNA of left lower pole which was benign ([place] II) and RUP nodule which was positive for PTC ([place] VI).\par \par Patient underwent total thyroidectomy with Dr Swanson (8/12/2020). Pathology showed 2.5cm right PTC classical variant with 0.3cm microcarcinoma in isthmus, no ETE with 2 LN positive both LV VI, largest measuring 0.9cm.\par 9/2020 TSH 32.9, Free T4 1.1, TT3 69. TG 0.2, TGAB 4598\par Patient underwent radioactive iodine treatment on October 2020, stimulated thyroglobulin <0.2, with positive TG antibody.  TSH was 84.3.\par \par She had a thyroid ultrasound in December 10, 2020.\par It was noted that there were multiple abnormal lymph nodes are seen involving the right neck.  Representative lymph node right level 3 position measuring 1.5 x 1.7 x 1.0 cm in size with an adjacent lymph node.  Small echogenic foci seen suggesting microcalcifications.  On the left neck, there is a small 1.0 cm lymph node with questionable significance.\par \par December 2020: Patient was advised to increase her levothyroxine dosage to 175 mcg once daily\par She had repeat thyroid function done in January 2021,TSH was found to be 9.56, free T4 1.6.\par \par Regarding obesity, patient states that she has been working on diet.  She is a mother of 4 children.  Oldest is 27 years old.\par \par Regarding prediabetes, on July 16, 2020, A1c was 5.8%, he has been averaging between 5.8% to 6.3% since February 2015.  Counseled patient on diet and exercise extensively.\par \par

## 2021-04-06 NOTE — PHYSICAL EXAM
[Alert] : alert [Well Nourished] : well nourished [No Acute Distress] : no acute distress [Well Developed] : well developed [Normal Sclera/Conjunctiva] : normal sclera/conjunctiva [EOMI] : extra ocular movement intact [No Proptosis] : no proptosis [Normal Oropharynx] : the oropharynx was normal [Thyroid Not Enlarged] : the thyroid was not enlarged [No Thyroid Nodules] : no palpable thyroid nodules [No Respiratory Distress] : no respiratory distress [No Accessory Muscle Use] : no accessory muscle use [Clear to Auscultation] : lungs were clear to auscultation bilaterally [Normal S1, S2] : normal S1 and S2 [Normal Rate] : heart rate was normal [Regular Rhythm] : with a regular rhythm [No Edema] : no peripheral edema [Pedal Pulses Normal] : the pedal pulses are present [Normal Bowel Sounds] : normal bowel sounds [Not Tender] : non-tender [Not Distended] : not distended [Soft] : abdomen soft [Normal Anterior Cervical Nodes] : no anterior cervical lymphadenopathy [Normal Posterior Cervical Nodes] : no posterior cervical lymphadenopathy [No Spinal Tenderness] : no spinal tenderness [Spine Straight] : spine straight [No Stigmata of Cushings Syndrome] : no stigmata of Cushings Syndrome [Normal Gait] : normal gait [Normal Strength/Tone] : muscle strength and tone were normal [No Rash] : no rash [Acanthosis Nigricans] : no acanthosis nigricans [Normal Reflexes] : deep tendon reflexes were 2+ and symmetric [No Tremors] : no tremors [Oriented x3] : oriented to person, place, and time [de-identified] : Obese woman, with BMI of 53.73

## 2021-04-09 LAB
ESTIMATED AVERAGE GLUCOSE: 126 MG/DL
HBA1C MFR BLD HPLC: 6 %
T4 FREE SERPL-MCNC: 1.6 NG/DL
THYROGLOB AB SERPL-ACNC: 2758 IU/ML
THYROGLOB SERPL-MCNC: <0.2 NG/ML
TSH SERPL-ACNC: 8.16 UIU/ML

## 2021-04-12 LAB
CLINICAL BIOCHEMIST REVIEW: NORMAL
THYROGLOB SERPL-MCNC: <0.2 NG/ML

## 2021-04-16 ENCOUNTER — TRANSCRIPTION ENCOUNTER (OUTPATIENT)
Age: 46
End: 2021-04-16

## 2021-04-22 ENCOUNTER — RX RENEWAL (OUTPATIENT)
Age: 46
End: 2021-04-22

## 2021-04-26 ENCOUNTER — APPOINTMENT (OUTPATIENT)
Dept: DISASTER EMERGENCY | Facility: OTHER | Age: 46
End: 2021-04-26
Payer: MEDICAID

## 2021-04-26 ENCOUNTER — NON-APPOINTMENT (OUTPATIENT)
Age: 46
End: 2021-04-26

## 2021-04-26 PROCEDURE — 0002A: CPT

## 2021-04-28 ENCOUNTER — APPOINTMENT (OUTPATIENT)
Dept: INTERNAL MEDICINE | Facility: CLINIC | Age: 46
End: 2021-04-28
Payer: MEDICAID

## 2021-04-28 VITALS
BODY MASS INDEX: 50.02 KG/M2 | DIASTOLIC BLOOD PRESSURE: 97 MMHG | WEIGHT: 293 LBS | RESPIRATION RATE: 16 BRPM | SYSTOLIC BLOOD PRESSURE: 160 MMHG | HEIGHT: 64 IN | HEART RATE: 81 BPM | TEMPERATURE: 97.9 F | OXYGEN SATURATION: 95 %

## 2021-04-28 PROCEDURE — 99072 ADDL SUPL MATRL&STAF TM PHE: CPT

## 2021-04-28 PROCEDURE — 99214 OFFICE O/P EST MOD 30 MIN: CPT

## 2021-04-28 RX ORDER — KETOTIFEN FUMARATE 0.25 MG/ML
0.03 SOLUTION OPHTHALMIC
Qty: 1 | Refills: 1 | Status: DISCONTINUED | COMMUNITY
Start: 2019-06-28 | End: 2021-04-28

## 2021-05-10 ENCOUNTER — RX RENEWAL (OUTPATIENT)
Age: 46
End: 2021-05-10

## 2021-05-18 ENCOUNTER — OUTPATIENT (OUTPATIENT)
Dept: OUTPATIENT SERVICES | Facility: HOSPITAL | Age: 46
LOS: 1 days | End: 2021-05-18
Payer: MEDICAID

## 2021-05-18 ENCOUNTER — APPOINTMENT (OUTPATIENT)
Dept: ULTRASOUND IMAGING | Facility: CLINIC | Age: 46
End: 2021-05-18
Payer: MEDICAID

## 2021-05-18 DIAGNOSIS — Z90.710 ACQUIRED ABSENCE OF BOTH CERVIX AND UTERUS: Chronic | ICD-10-CM

## 2021-05-18 DIAGNOSIS — C73 MALIGNANT NEOPLASM OF THYROID GLAND: ICD-10-CM

## 2021-05-18 DIAGNOSIS — Z98.51 TUBAL LIGATION STATUS: Chronic | ICD-10-CM

## 2021-05-18 PROCEDURE — 76536 US EXAM OF HEAD AND NECK: CPT | Mod: 26

## 2021-05-18 PROCEDURE — 76536 US EXAM OF HEAD AND NECK: CPT

## 2021-05-26 ENCOUNTER — NON-APPOINTMENT (OUTPATIENT)
Age: 46
End: 2021-05-26

## 2021-05-27 ENCOUNTER — NON-APPOINTMENT (OUTPATIENT)
Age: 46
End: 2021-05-27

## 2021-05-27 ENCOUNTER — APPOINTMENT (OUTPATIENT)
Dept: VASCULAR SURGERY | Facility: CLINIC | Age: 46
End: 2021-05-27
Payer: MEDICAID

## 2021-05-27 VITALS
WEIGHT: 293 LBS | SYSTOLIC BLOOD PRESSURE: 119 MMHG | HEART RATE: 103 BPM | DIASTOLIC BLOOD PRESSURE: 82 MMHG | BODY MASS INDEX: 50.02 KG/M2 | HEIGHT: 64 IN

## 2021-05-27 DIAGNOSIS — I87.2 VENOUS INSUFFICIENCY (CHRONIC) (PERIPHERAL): ICD-10-CM

## 2021-05-27 PROCEDURE — 93970 EXTREMITY STUDY: CPT

## 2021-05-27 PROCEDURE — 99203 OFFICE O/P NEW LOW 30 MIN: CPT

## 2021-05-27 NOTE — REVIEW OF SYSTEMS
[As Noted in HPI] : as noted in HPI [Joint Swelling] : joint swelling [Limb Swelling] : limb swelling [Negative] : Psychiatric

## 2021-05-27 NOTE — ASSESSMENT
[Other: _____] : [unfilled] [FreeTextEntry1] : ENEDINA GOETZ is a 45 year old female presents for evaluation of bilateral lower extremity swelling.\par \par > Venous insufficiency, CEAP C3\par –Reviewed results of duplex with patient.  There is evidence of bilateral lower extremity venous insufficiency.\par –Recommend compression stockings (20 to 30 mmHg), leg elevation, and continued ambulation.  If patient has no symptom resolution, will discuss operative management.\par –Follow-up in 3 months with repeat duplex.

## 2021-05-27 NOTE — CONSULT LETTER
[Dear  ___] : Dear  [unfilled], [Consult Letter:] : I had the pleasure of evaluating your patient, [unfilled]. [Please see my note below.] : Please see my note below. [Consult Closing:] : Thank you very much for allowing me to participate in the care of this patient.  If you have any questions, please do not hesitate to contact me. [Sincerely,] : Sincerely, [FreeTextEntry3] : \par \par \par \par Emma Shaw MD, RPVI\par Division of Vascular & Endovascular Surgery\par [facility], Department of Surgery  [FreeTextEntry1] : She presented to the office for evaluation of bilateral lower extremity swelling.  On exam, she had bilateral lower extremity swelling, slightly worse on the left side.  I performed a venous duplex in the office.  This was negative for DVT or SVT bilaterally.  It was positive for bilateral GSV reflux.  At this time, I am recommending conservative therapy with the use of compression stockings, leg elevation, and ambulation.  I plan to see her again in 3 months.

## 2021-05-27 NOTE — HISTORY OF PRESENT ILLNESS
[FreeTextEntry1] : ENEDINA GOETZ is a 45 year old female who presents for evaluation of bilateral lower extremity swelling\par \par Referred by Dr Neida England.\par \par Patient states since the beginning of April she noticed bilateral lower extremity swelling. It is not affected by activity or time of day. She wakes up with it in the morning. She denies any trauma or injury. Denies any recent travel, long car/plane rides. She saw her PCP and as per pt has a thyroid ultrasound and Synthroid was increased and was started on HCTZ which she did not notice any improvement.  No prior use of compression stockings.  Today her left foot is more swollen than the right. Denies history of DVT.  She also reports increased weight gain of approximately 15 to 20 pounds in the past few months.\par \par + PMH: Hypothyroid, HTN, Asthma, Obesity\par + PSH: Thyroidectomy, partial hysterectomy\par + FH: Mom-HTN, Dad-DM\par + SH: Never smoker, no alcohol use\par + Aller: NKDA\par \par PCP is Dr. Neida England\par

## 2021-05-27 NOTE — PHYSICAL EXAM
[2+] : right 2+ [Ankle Swelling (On Exam)] : present [Ankle Swelling Bilaterally] : bilaterally  [Alert] : alert [Oriented to Person] : oriented to person [Oriented to Place] : oriented to place [Oriented to Time] : oriented to time [Calm] : calm [Normal Breath Sounds] : Normal breath sounds [Respiratory Effort] : normal respiratory effort [Normal Heart Sounds] : normal heart sounds [Normal Rate and Rhythm] : normal rate and rhythm [de-identified] : well  [FreeTextEntry1] : Difficult to appreciate distal pulses d/t edema. Bilateral lower extremities w/ warm intact skin. No open wounds. Scattered spider veins. Left foot edema worse than right.  [de-identified] : obese  [de-identified] : LYDIAL  [de-identified] : Cooperative

## 2021-06-25 ENCOUNTER — APPOINTMENT (OUTPATIENT)
Dept: CARDIOLOGY | Facility: CLINIC | Age: 46
End: 2021-06-25

## 2021-08-04 ENCOUNTER — NON-APPOINTMENT (OUTPATIENT)
Age: 46
End: 2021-08-04

## 2021-08-04 ENCOUNTER — APPOINTMENT (OUTPATIENT)
Dept: INTERNAL MEDICINE | Facility: CLINIC | Age: 46
End: 2021-08-04
Payer: MEDICAID

## 2021-08-04 VITALS
OXYGEN SATURATION: 91 % | RESPIRATION RATE: 16 BRPM | DIASTOLIC BLOOD PRESSURE: 87 MMHG | SYSTOLIC BLOOD PRESSURE: 145 MMHG | WEIGHT: 293 LBS | TEMPERATURE: 98 F | HEIGHT: 64 IN | HEART RATE: 72 BPM | BODY MASS INDEX: 50.02 KG/M2

## 2021-08-04 DIAGNOSIS — Z12.39 ENCOUNTER FOR OTHER SCREENING FOR MALIGNANT NEOPLASM OF BREAST: ICD-10-CM

## 2021-08-04 PROCEDURE — 93000 ELECTROCARDIOGRAM COMPLETE: CPT

## 2021-08-04 PROCEDURE — 99396 PREV VISIT EST AGE 40-64: CPT | Mod: 25

## 2021-08-04 NOTE — PHYSICAL EXAM
[No Acute Distress] : no acute distress [Well Nourished] : well nourished [Well Developed] : well developed [Well-Appearing] : well-appearing [Normal Sclera/Conjunctiva] : normal sclera/conjunctiva [PERRL] : pupils equal round and reactive to light [EOMI] : extraocular movements intact [Normal Outer Ear/Nose] : the outer ears and nose were normal in appearance [No JVD] : no jugular venous distention [Supple] : supple [No Respiratory Distress] : no respiratory distress  [Clear to Auscultation] : lungs were clear to auscultation bilaterally [Normal Rate] : normal rate  [Regular Rhythm] : with a regular rhythm [No Edema] : there was no peripheral edema [Declined Breast Exam] : declined breast exam  [Soft] : abdomen soft [Non Tender] : non-tender [Non-distended] : non-distended [Normal Bowel Sounds] : normal bowel sounds [Normal Posterior Cervical Nodes] : no posterior cervical lymphadenopathy [Normal Anterior Cervical Nodes] : no anterior cervical lymphadenopathy [No CVA Tenderness] : no CVA  tenderness [No Spinal Tenderness] : no spinal tenderness [No Joint Swelling] : no joint swelling [Grossly Normal Strength/Tone] : grossly normal strength/tone [No Rash] : no rash [Coordination Grossly Intact] : coordination grossly intact [Normal Gait] : normal gait [Normal Affect] : the affect was normal [Normal Insight/Judgement] : insight and judgment were intact [de-identified] : Obese  [de-identified] : Mild edema noted laterly right and calf tenderness noted

## 2021-08-04 NOTE — PLAN
[FreeTextEntry1] : 45 y/o F presents for CPE\par HTN uncontrolled today but she did not take her medication\par EKG reviewed especially as she had continued Edema despite diuretic \par Will refer her to cardiologist for consultation and risk stratification\par High Hb A1c\par Continue aggressive lifestyle modifications  \par \par HCM: \par Referral for MMG-GYN given\par Continue with aggressive health and lifestyle modifications.\par Patient will continue active healthy lifestyle including diet exercise weight control, seatbelt safety, carbon monoxide detector use, accident prevention, skin cancer prevention, fall precautions.\par Patient verbalizes understanding and is in agreement with the plan

## 2021-08-04 NOTE — HEALTH RISK ASSESSMENT
[Good] : ~his/her~  mood as  good [No] : In the past 12 months have you used drugs other than those required for medical reasons? No [No falls in past year] : Patient reported no falls in the past year [HIV test declined] : HIV test declined [Hepatitis C test declined] : Hepatitis C test declined [None] : None [Fully functional (bathing, dressing, toileting, transferring, walking, feeding)] : Fully functional (bathing, dressing, toileting, transferring, walking, feeding) [Fully functional (using the telephone, shopping, preparing meals, housekeeping, doing laundry, using] : Fully functional and needs no help or supervision to perform IADLs (using the telephone, shopping, preparing meals, housekeeping, doing laundry, using transportation, managing medications and managing finances) [] : No [Change in mental status noted] : No change in mental status noted

## 2021-08-04 NOTE — ADDENDUM
[FreeTextEntry1] : Documented by Madalyn Dupont acting as a scribe for Dr. Neida England.  08/04/2021 \par \par All medical record entries made by the Scribe were at my, Dr. Neida England, direction and personally dictated by me on 08/04/2021 .  I have reviewed the chart and agree that the record accurately reflects my personal performance of the history, physical exam, assessment and plan.  I have also personally directed, reviewed, and agreed with the chart.

## 2021-08-04 NOTE — HISTORY OF PRESENT ILLNESS
[de-identified] : 45 y/o F presents for fasting CPE. \par Since last visit  she did take the water pill w/o resolution of the swelling of her legs\par She states she is trying to watch a healthy diet and not exercise \par No chest pain, pressure, sometimes winded as walking\par Did receive COVID-19 vaccine \par Referral needed for GYN-MMG\par Otherwise feeling well and offers no complaints \par

## 2021-08-06 LAB
25(OH)D3 SERPL-MCNC: 43 NG/ML
ALBUMIN SERPL ELPH-MCNC: 4.6 G/DL
ALP BLD-CCNC: 81 U/L
ALT SERPL-CCNC: 22 U/L
ANION GAP SERPL CALC-SCNC: 12 MMOL/L
AST SERPL-CCNC: 19 U/L
BASOPHILS # BLD AUTO: 0.04 K/UL
BASOPHILS NFR BLD AUTO: 0.5 %
BILIRUB SERPL-MCNC: 0.2 MG/DL
BUN SERPL-MCNC: 15 MG/DL
CALCIUM SERPL-MCNC: 10.2 MG/DL
CHLORIDE SERPL-SCNC: 97 MMOL/L
CHOLEST SERPL-MCNC: 170 MG/DL
CO2 SERPL-SCNC: 33 MMOL/L
CREAT SERPL-MCNC: 0.53 MG/DL
EOSINOPHIL # BLD AUTO: 0.09 K/UL
EOSINOPHIL NFR BLD AUTO: 1.2 %
ESTIMATED AVERAGE GLUCOSE: 126 MG/DL
GLUCOSE SERPL-MCNC: 86 MG/DL
HBA1C MFR BLD HPLC: 6 %
HCT VFR BLD CALC: 47 %
HDLC SERPL-MCNC: 96 MG/DL
HGB BLD-MCNC: 14.4 G/DL
IMM GRANULOCYTES NFR BLD AUTO: 0.5 %
LDLC SERPL CALC-MCNC: 57 MG/DL
LYMPHOCYTES # BLD AUTO: 1.76 K/UL
LYMPHOCYTES NFR BLD AUTO: 23.9 %
MAN DIFF?: NORMAL
MCHC RBC-ENTMCNC: 25.9 PG
MCHC RBC-ENTMCNC: 30.6 GM/DL
MCV RBC AUTO: 84.4 FL
MONOCYTES # BLD AUTO: 0.39 K/UL
MONOCYTES NFR BLD AUTO: 5.3 %
NEUTROPHILS # BLD AUTO: 5.05 K/UL
NEUTROPHILS NFR BLD AUTO: 68.6 %
NONHDLC SERPL-MCNC: 74 MG/DL
PLATELET # BLD AUTO: 275 K/UL
POTASSIUM SERPL-SCNC: 5 MMOL/L
PROT SERPL-MCNC: 7.7 G/DL
RBC # BLD: 5.57 M/UL
RBC # FLD: 14.8 %
SODIUM SERPL-SCNC: 141 MMOL/L
T4 FREE SERPL-MCNC: 1.8 NG/DL
TRIGL SERPL-MCNC: 82 MG/DL
TSH SERPL-ACNC: 6.61 UIU/ML
WBC # FLD AUTO: 7.37 K/UL

## 2021-08-11 ENCOUNTER — TRANSCRIPTION ENCOUNTER (OUTPATIENT)
Age: 46
End: 2021-08-11

## 2021-08-11 ENCOUNTER — APPOINTMENT (OUTPATIENT)
Dept: ULTRASOUND IMAGING | Facility: IMAGING CENTER | Age: 46
End: 2021-08-11
Payer: MEDICAID

## 2021-08-11 ENCOUNTER — OUTPATIENT (OUTPATIENT)
Dept: OUTPATIENT SERVICES | Facility: HOSPITAL | Age: 46
LOS: 1 days | End: 2021-08-11
Payer: MEDICAID

## 2021-08-11 ENCOUNTER — RX RENEWAL (OUTPATIENT)
Age: 46
End: 2021-08-11

## 2021-08-11 ENCOUNTER — RESULT REVIEW (OUTPATIENT)
Age: 46
End: 2021-08-11

## 2021-08-11 DIAGNOSIS — C73 MALIGNANT NEOPLASM OF THYROID GLAND: ICD-10-CM

## 2021-08-11 DIAGNOSIS — Z90.710 ACQUIRED ABSENCE OF BOTH CERVIX AND UTERUS: Chronic | ICD-10-CM

## 2021-08-11 DIAGNOSIS — Z98.51 TUBAL LIGATION STATUS: Chronic | ICD-10-CM

## 2021-08-11 PROCEDURE — 10005 FNA BX W/US GDN 1ST LES: CPT

## 2021-08-11 PROCEDURE — 88184 FLOWCYTOMETRY/ TC 1 MARKER: CPT

## 2021-08-11 PROCEDURE — 87205 SMEAR GRAM STAIN: CPT

## 2021-08-11 PROCEDURE — 88305 TISSUE EXAM BY PATHOLOGIST: CPT

## 2021-08-11 PROCEDURE — 88185 FLOWCYTOMETRY/TC ADD-ON: CPT

## 2021-08-11 PROCEDURE — 88172 CYTP DX EVAL FNA 1ST EA SITE: CPT

## 2021-08-11 PROCEDURE — 88173 CYTOPATH EVAL FNA REPORT: CPT

## 2021-08-11 PROCEDURE — 88189 FLOWCYTOMETRY/READ 16 & >: CPT

## 2021-08-11 PROCEDURE — 88173 CYTOPATH EVAL FNA REPORT: CPT | Mod: 26

## 2021-08-11 PROCEDURE — 88305 TISSUE EXAM BY PATHOLOGIST: CPT | Mod: 26

## 2021-08-12 LAB — NON-GYNECOLOGICAL CYTOLOGY STUDY: SIGNIFICANT CHANGE UP

## 2021-08-17 ENCOUNTER — TRANSCRIPTION ENCOUNTER (OUTPATIENT)
Age: 46
End: 2021-08-17

## 2021-08-17 LAB — TM INTERPRETATION: SIGNIFICANT CHANGE UP

## 2021-08-24 ENCOUNTER — APPOINTMENT (OUTPATIENT)
Dept: ENDOCRINOLOGY | Facility: CLINIC | Age: 46
End: 2021-08-24
Payer: MEDICAID

## 2021-08-24 VITALS
DIASTOLIC BLOOD PRESSURE: 80 MMHG | HEIGHT: 64 IN | TEMPERATURE: 98.2 F | WEIGHT: 209 LBS | SYSTOLIC BLOOD PRESSURE: 132 MMHG | BODY MASS INDEX: 35.68 KG/M2 | HEART RATE: 74 BPM | OXYGEN SATURATION: 98 %

## 2021-08-24 PROCEDURE — 99214 OFFICE O/P EST MOD 30 MIN: CPT

## 2021-08-26 NOTE — HISTORY OF PRESENT ILLNESS
[FreeTextEntry1] : Patient is a 45-year-old woman here to establish care for papillary thyroid cancer, obesity, prediabetes.\par \par Patient was noted to have enlarged thyroid on routine examination at GYN office, was referred for thyroid US which showed multinodular goiter as below: \par Thyroid sono:\par RUP nodule: Hypoechoic lobulated measuring 1.3 x 0.8 x 0.8 cm, with irregular borders and bulges the capsular surface. RMP nodule solid 0.6 x 0.6 x 0.5 cm. Occasional calcifications are present. \par LMP solid nodule measuring 1.2 x 1.2 x 1.1 cm with smooth border. LMP solid slightly hypoechoic nodule measuring 1.1 x 0.9 x 0.6 cm. There is a dominant solid vascular lower pole nodule measuring 3.3 x 2.9 x 3.1 cm.\par Well-defined solid hypoechoic isthmic nodule measuring 1.4 x 0.8 x 1.1 cm.\par \par Patient with no prior history of any thyroid disorders. Does endorse mother with some thyroid disorder but not sure what type. No family history of thyroid cancer. No history of radiation to neck. No past use of amiodarone or lithium. No recent IV contrast studies. \par \par 2/2020 TSH 2.5, Free T4 1.6, TT3 136\par \par 3/2020, FNA of left lower pole which was benign ([place] II) and RUP nodule which was positive for PTC ([place] VI).\par \par Patient underwent total thyroidectomy with Dr Swanson (8/12/2020). Pathology showed 2.5cm right PTC classical variant with 0.3cm microcarcinoma in isthmus, no ETE with 2 LN positive both LV VI, largest measuring 0.9cm.\par 9/2020 TSH 32.9, Free T4 1.1, TT3 69. TG 0.2, TGAB 4598\par Patient underwent radioactive iodine treatment on October 2020, stimulated thyroglobulin <0.2, with positive TG antibody.  TSH was 84.3.\par \par She had a thyroid ultrasound in December 10, 2020.\par It was noted that there were multiple abnormal lymph nodes are seen involving the right neck.  Representative lymph node right level 3 position measuring 1.5 x 1.7 x 1.0 cm in size with an adjacent lymph node.  Small echogenic foci seen suggesting microcalcifications.  On the left neck, there is a small 1.0 cm lymph node with questionable significance.\par \par The level 3 lymph node was s/p FNA 8/11/2021, which turned out to be positive for malignant cells. \par \par Regarding obesity, patient states that she has been working on diet.  She is a mother of 4 children.  Oldest is 27 years old.\par \par Regarding prediabetes, on July 16, 2020, A1c was 5.8%, he has been averaging between 5.8% to 6.3% since February 2015.  Counseled patient on diet and exercise extensively.\par \par

## 2021-08-26 NOTE — ASSESSMENT
[FreeTextEntry1] : 46 year old F presented for followup of papillary thyroid cancer s/p total thyroidectomy \par \par 1. Papillary thyroid cancer in setting of multinodular goiter:\par - Patient is clinically and biochemically euthyroid\par - s/p total thyroidectomy 8/12/2020 with Dr Swanson. Pathology showed 2.5cm right PTC classical variant with 0.3cm microcarcinoma in isthmus, no ETE with 2 LN positive both LV VI, largest measuring 0.9cm.\par - s/p KAHN 10/20 98.3 mCi 131, post therapy scan showed un-dectable TG in presence of elevated TG AB\par - US in Dec 2020 showing: \par Multiple abnormal lymph nodes are seen involving the right neck. Representative lymph node right level 3 position measures 1.5 x 1.7 x 1.0 cm in size with an adjacent lymph node. Small echogenic foci seen suggesting microcalcifications. \par On left neck there is a small 1.0cm lymph node with questionable significance. \par -FNA done for RIGHT level 3 lymph node measuring 1.7cm containing calcification, positive for maligiant cells, metastatic papillary thyroid carcinoma. \par -Discuss surgery followed by KAHN vs. KAHN treatment, patient would like to consider surgery first followed by second KAHN treatment if there's uptake.  \par \par 2. Prediabetes:\par - HbA1c 6.0% Aug 2020 , continue with lifestyle changes. \par - Will continue to work on diet and exercise.\par \par 3. Obesity:\par - Counseled about diet, weight loss and exercise \par -Patient is considering bariatric surgical intervention. We will continue this discussion.\par \par 4. Essential HTN:\par - BP today 132/80\par - Continue with current regimen \par - Continue with Triamterene -HCTZ 37.5-25\par - Amlodipine 5mg once daily. \par

## 2021-08-26 NOTE — PHYSICAL EXAM
[Alert] : alert [Well Nourished] : well nourished [No Acute Distress] : no acute distress [Well Developed] : well developed [Normal Sclera/Conjunctiva] : normal sclera/conjunctiva [EOMI] : extra ocular movement intact [No Proptosis] : no proptosis [Normal Oropharynx] : the oropharynx was normal [Thyroid Not Enlarged] : the thyroid was not enlarged [No Thyroid Nodules] : no palpable thyroid nodules [No Respiratory Distress] : no respiratory distress [No Accessory Muscle Use] : no accessory muscle use [Clear to Auscultation] : lungs were clear to auscultation bilaterally [Normal S1, S2] : normal S1 and S2 [Normal Rate] : heart rate was normal [Regular Rhythm] : with a regular rhythm [No Edema] : no peripheral edema [Pedal Pulses Normal] : the pedal pulses are present [Normal Bowel Sounds] : normal bowel sounds [Not Tender] : non-tender [Not Distended] : not distended [Soft] : abdomen soft [Normal Anterior Cervical Nodes] : no anterior cervical lymphadenopathy [No Spinal Tenderness] : no spinal tenderness [Spine Straight] : spine straight [No Stigmata of Cushings Syndrome] : no stigmata of Cushings Syndrome [Normal Gait] : normal gait [Normal Strength/Tone] : muscle strength and tone were normal [No Rash] : no rash [Normal Reflexes] : deep tendon reflexes were 2+ and symmetric [No Tremors] : no tremors [Oriented x3] : oriented to person, place, and time [Acanthosis Nigricans] : no acanthosis nigricans [de-identified] : Obese woman, with BMI of 53.73

## 2021-09-02 ENCOUNTER — APPOINTMENT (OUTPATIENT)
Dept: VASCULAR SURGERY | Facility: CLINIC | Age: 46
End: 2021-09-02

## 2021-09-08 ENCOUNTER — APPOINTMENT (OUTPATIENT)
Dept: CARDIOLOGY | Facility: CLINIC | Age: 46
End: 2021-09-08
Payer: MEDICAID

## 2021-09-08 ENCOUNTER — APPOINTMENT (OUTPATIENT)
Dept: CARDIOLOGY | Facility: CLINIC | Age: 46
End: 2021-09-08

## 2021-09-08 ENCOUNTER — NON-APPOINTMENT (OUTPATIENT)
Age: 46
End: 2021-09-08

## 2021-09-08 VITALS
SYSTOLIC BLOOD PRESSURE: 153 MMHG | WEIGHT: 293 LBS | BODY MASS INDEX: 50.02 KG/M2 | RESPIRATION RATE: 18 BRPM | HEIGHT: 64 IN | DIASTOLIC BLOOD PRESSURE: 98 MMHG | OXYGEN SATURATION: 95 % | HEART RATE: 97 BPM | TEMPERATURE: 98.2 F

## 2021-09-08 VITALS — DIASTOLIC BLOOD PRESSURE: 90 MMHG | SYSTOLIC BLOOD PRESSURE: 140 MMHG

## 2021-09-08 PROCEDURE — 93000 ELECTROCARDIOGRAM COMPLETE: CPT

## 2021-09-08 PROCEDURE — 99204 OFFICE O/P NEW MOD 45 MIN: CPT

## 2021-09-08 NOTE — REASON FOR VISIT
[FreeTextEntry1] : pt c/o swollen feet for 4-5 months, more at end of day, left > rt\par no sob or sscp or palps

## 2021-09-08 NOTE — DISCUSSION/SUMMARY
[Hypertension] : hypertension [Responding to Treatment] : responding to treatment [Outpatient Evaluation] : outpatient evaluation [Ambulatory BP Monitoring] : ambulatory blood pressure monitoring [Echocardiogram] : echocardiogram [Medication Changes Per Orders] : Medication changes are as documented in orders [Venous Insufficiency] : venous insufficiency [de-identified] [facility] [place] [facility] [de-identified] : vasc f/u, d/c norvasc, may need lasix prn

## 2021-09-21 ENCOUNTER — APPOINTMENT (OUTPATIENT)
Dept: ULTRASOUND IMAGING | Facility: CLINIC | Age: 46
End: 2021-09-21
Payer: MEDICAID

## 2021-09-21 ENCOUNTER — RESULT REVIEW (OUTPATIENT)
Age: 46
End: 2021-09-21

## 2021-09-21 ENCOUNTER — OUTPATIENT (OUTPATIENT)
Dept: OUTPATIENT SERVICES | Facility: HOSPITAL | Age: 46
LOS: 1 days | End: 2021-09-21
Payer: MEDICAID

## 2021-09-21 ENCOUNTER — APPOINTMENT (OUTPATIENT)
Dept: MAMMOGRAPHY | Facility: CLINIC | Age: 46
End: 2021-09-21
Payer: MEDICAID

## 2021-09-21 DIAGNOSIS — Z90.710 ACQUIRED ABSENCE OF BOTH CERVIX AND UTERUS: Chronic | ICD-10-CM

## 2021-09-21 DIAGNOSIS — Z12.39 ENCOUNTER FOR OTHER SCREENING FOR MALIGNANT NEOPLASM OF BREAST: ICD-10-CM

## 2021-09-21 DIAGNOSIS — R92.2 INCONCLUSIVE MAMMOGRAM: ICD-10-CM

## 2021-09-21 DIAGNOSIS — Z98.51 TUBAL LIGATION STATUS: Chronic | ICD-10-CM

## 2021-09-21 PROCEDURE — 76641 ULTRASOUND BREAST COMPLETE: CPT | Mod: 26,50

## 2021-09-21 PROCEDURE — 77067 SCR MAMMO BI INCL CAD: CPT

## 2021-09-21 PROCEDURE — 77067 SCR MAMMO BI INCL CAD: CPT | Mod: 26

## 2021-09-21 PROCEDURE — 76641 ULTRASOUND BREAST COMPLETE: CPT

## 2021-09-21 PROCEDURE — 77063 BREAST TOMOSYNTHESIS BI: CPT

## 2021-09-21 PROCEDURE — 77063 BREAST TOMOSYNTHESIS BI: CPT | Mod: 26

## 2021-09-22 ENCOUNTER — APPOINTMENT (OUTPATIENT)
Dept: CARDIOLOGY | Facility: CLINIC | Age: 46
End: 2021-09-22
Payer: MEDICAID

## 2021-09-22 VITALS
OXYGEN SATURATION: 98 % | SYSTOLIC BLOOD PRESSURE: 130 MMHG | WEIGHT: 293 LBS | BODY MASS INDEX: 50.02 KG/M2 | HEART RATE: 77 BPM | RESPIRATION RATE: 17 BRPM | DIASTOLIC BLOOD PRESSURE: 80 MMHG | HEIGHT: 64 IN

## 2021-09-22 PROCEDURE — 93306 TTE W/DOPPLER COMPLETE: CPT

## 2021-09-22 PROCEDURE — 99214 OFFICE O/P EST MOD 30 MIN: CPT

## 2021-09-22 NOTE — REASON FOR VISIT
[FreeTextEntry1] : pt h/o swollen feet for 4-5 months, more at end of day, left > rt\par no sob or sscp or palps\par \par now much better off norvasc

## 2021-09-22 NOTE — DISCUSSION/SUMMARY
[Hypertension] : hypertension [Responding to Treatment] : responding to treatment [Outpatient Evaluation] : outpatient evaluation [Ambulatory BP Monitoring] : ambulatory blood pressure monitoring [Echocardiogram] : echocardiogram [Medication Changes Per Orders] : Medication changes are as documented in orders [Venous Insufficiency] : venous insufficiency [de-identified] : sinus tachycardia [de-identified] : s/p chg norvasc 5 to valsarten 160 [de-identified] : vasc f/u, d/c norvasc, may need lasix prn

## 2021-09-29 NOTE — DATA REVIEWED
How Severe Is Your Cyst?: mild Is This A New Presentation, Or A Follow-Up?: Cyst [FreeTextEntry1] : 5/27/21 Venous Doppler:\par Negative for DVT or SVT bilaterally.\par Right–reflux noted in the GSV (not at SF J) and SSV.\par Left–reflux noted in the GSV.

## 2021-09-30 ENCOUNTER — TRANSCRIPTION ENCOUNTER (OUTPATIENT)
Age: 46
End: 2021-09-30

## 2021-10-06 ENCOUNTER — APPOINTMENT (OUTPATIENT)
Dept: PEDIATRIC ALLERGY IMMUNOLOGY | Facility: CLINIC | Age: 46
End: 2021-10-06
Payer: MEDICAID

## 2021-10-06 VITALS
SYSTOLIC BLOOD PRESSURE: 125 MMHG | TEMPERATURE: 96.3 F | HEART RATE: 81 BPM | BODY MASS INDEX: 50.02 KG/M2 | HEIGHT: 64 IN | OXYGEN SATURATION: 96 % | WEIGHT: 293 LBS | DIASTOLIC BLOOD PRESSURE: 88 MMHG

## 2021-10-06 DIAGNOSIS — J30.89 OTHER ALLERGIC RHINITIS: ICD-10-CM

## 2021-10-06 DIAGNOSIS — B00.1 HERPESVIRAL VESICULAR DERMATITIS: ICD-10-CM

## 2021-10-06 PROCEDURE — 99214 OFFICE O/P EST MOD 30 MIN: CPT

## 2021-10-07 ENCOUNTER — APPOINTMENT (OUTPATIENT)
Dept: SURGERY | Facility: CLINIC | Age: 46
End: 2021-10-07
Payer: MEDICAID

## 2021-10-07 DIAGNOSIS — E04.2 NONTOXIC MULTINODULAR GOITER: ICD-10-CM

## 2021-10-07 PROCEDURE — 99214 OFFICE O/P EST MOD 30 MIN: CPT

## 2021-10-07 NOTE — ASSESSMENT
[FreeTextEntry1] : recommended right modified radical neck dissection.  risks, benefits and alternatives discussed at length.  I have discussed with the patient the anatomy of the area, the pathophysiology of the disease process and the rationale for surgery.  The attendant risks, possible complications and expected postoperative course have been discussed in detail.  I have given the patient the opportunity to ask questions, and all questions have been answered to the patient's satisfaction, and they wish to proceed with the planned procedure.  to be scheduled at [facility].

## 2021-10-07 NOTE — HISTORY OF PRESENT ILLNESS
[de-identified] : 1 year s/p total thyroidectomy with postop KAHN for thyroid malignancy.  preoperatively no cervical adenopathy  and no nodes seen on KAHN scan.  now with right neck nodes. FNA positive for papillary carcinoma.  continues Synthroid 150 mcg daily.  I have reviewed all old and new data and available images.

## 2021-10-07 NOTE — PHYSICAL EXAM
[de-identified] : well healed scar [de-identified] : no palpable thyroid nodules [Laryngoscopy Performed] : laryngoscopy was performed, see procedure section for findings [Midline] : located in midline position [Normal] : orientation to person, place, and time: normal

## 2021-10-12 PROBLEM — J30.89 PERENNIAL ALLERGIC RHINITIS: Status: ACTIVE | Noted: 2017-06-07

## 2021-10-12 NOTE — REVIEW OF SYSTEMS
[Nl] : no history of recurrent infections of the sinuses,ear, throat, lungs (pneumonia/ bronchitis) or skin [de-identified] : itchy bump on top of lip, has been there since Monday, woke up with it. oral mouthwash, blistex tried using didn't work.

## 2021-10-12 NOTE — HISTORY OF PRESENT ILLNESS
[(# ___ in the past year)] : [unfilled] visits to the emergency room in the past year [0 x/month] : 0 x/month [None] : None [< or = 2 days/wk] : < than or = 2 days/week [0 - 1/year] : 0 - 1/year [> or = 20] : > than or = 20 [de-identified] : 45 year old female with asthma and allergic rhinitis who comes in for follow up.\par \par INTERVAL HISTORY:  No ER visits, generally healthy. No acute concerns. \par \par 1) ASTHMA- moderate persistent: well controlled\par -QVAR 1 puff BID daily, does not use spacer, but no missed doses. Will need a refill today.\par -Ventolin - have not use since July or June 2021.\par -Last spirometry in 2019\par -Vaccinated to COVID, Pfizer x2\par -Needs spirometry, no COVID test. Needs 1 prior to 5 days before test.\par \par ALLERGIC RHINITIS:\par -Flonase 1 spray each nightly for 2-3 weeks, prior to itchy throat/nasal congestion. October of last year to this September, stopped Flonase. May need refill.\par -Antihistamine the night before (Xyzal)\par -Still considering IT\par \par Patient noted a bump on upper lip today. [Shortness of Breath] : no shortness of breath [Dyspnea on Exertion] : no dyspnea on exertion [Cough] : no cough [Wheezing] : no wheezing [FreeTextEntry7] : 25

## 2021-10-12 NOTE — PHYSICAL EXAM
[Alert] : alert [Well Nourished] : well nourished [Healthy Appearance] : healthy appearance [No Acute Distress] : no acute distress [Well Developed] : well developed [No Discharge] : no discharge [No Photophobia] : no photophobia [Sclera Not Icteric] : sclera not icteric [Normal Nasal Mucosa] : the nasal mucosa was normal [Normal Lips/Tongue] : the lips and tongue were normal [Normal Outer Ear/Nose] : the ears and nose were normal in appearance [Normal Tonsils] : normal tonsils [No Thrush] : no thrush [Supple] : the neck was supple [Normal Rate and Effort] : normal respiratory rhythm and effort [No Crackles] : no crackles [No Retractions] : no retractions [Bilateral Audible Breath Sounds] : bilateral audible breath sounds [Normal Rate] : heart rate was normal  [Normal S1, S2] : normal S1 and S2 [No murmur] : no murmur [Regular Rhythm] : with a regular rhythm [No clubbing] : no clubbing [No Edema] : no edema [No Cyanosis] : no cyanosis [Normal Mood] : mood was normal [Normal Affect] : affect was normal [Alert, Awake, Oriented as Age-Appropriate] : alert, awake, oriented as age appropriate [Pale mucosa] : no pale mucosa [Wheezing] : no wheezing was heard [de-identified] : Obese [de-identified] : mid right upper lip flesh colored papule with mild erythema

## 2021-11-02 ENCOUNTER — OUTPATIENT (OUTPATIENT)
Dept: OUTPATIENT SERVICES | Facility: HOSPITAL | Age: 46
LOS: 1 days | End: 2021-11-02
Payer: MEDICAID

## 2021-11-02 VITALS
OXYGEN SATURATION: 99 % | HEIGHT: 63.5 IN | DIASTOLIC BLOOD PRESSURE: 86 MMHG | HEART RATE: 67 BPM | WEIGHT: 293 LBS | SYSTOLIC BLOOD PRESSURE: 120 MMHG | RESPIRATION RATE: 16 BRPM | TEMPERATURE: 98 F

## 2021-11-02 DIAGNOSIS — C77.9 SECONDARY AND UNSPECIFIED MALIGNANT NEOPLASM OF LYMPH NODE, UNSPECIFIED: ICD-10-CM

## 2021-11-02 DIAGNOSIS — Z98.51 TUBAL LIGATION STATUS: Chronic | ICD-10-CM

## 2021-11-02 DIAGNOSIS — R22.1 LOCALIZED SWELLING, MASS AND LUMP, NECK: ICD-10-CM

## 2021-11-02 DIAGNOSIS — E66.01 MORBID (SEVERE) OBESITY DUE TO EXCESS CALORIES: ICD-10-CM

## 2021-11-02 DIAGNOSIS — Z90.710 ACQUIRED ABSENCE OF BOTH CERVIX AND UTERUS: Chronic | ICD-10-CM

## 2021-11-02 DIAGNOSIS — E89.0 POSTPROCEDURAL HYPOTHYROIDISM: Chronic | ICD-10-CM

## 2021-11-02 LAB
ANION GAP SERPL CALC-SCNC: 10 MMOL/L — SIGNIFICANT CHANGE UP (ref 7–14)
BLD GP AB SCN SERPL QL: NEGATIVE — SIGNIFICANT CHANGE UP
BUN SERPL-MCNC: 20 MG/DL — SIGNIFICANT CHANGE UP (ref 7–23)
CALCIUM SERPL-MCNC: 10 MG/DL — SIGNIFICANT CHANGE UP (ref 8.4–10.5)
CHLORIDE SERPL-SCNC: 99 MMOL/L — SIGNIFICANT CHANGE UP (ref 98–107)
CO2 SERPL-SCNC: 32 MMOL/L — HIGH (ref 22–31)
CREAT SERPL-MCNC: 0.6 MG/DL — SIGNIFICANT CHANGE UP (ref 0.5–1.3)
GLUCOSE SERPL-MCNC: 94 MG/DL — SIGNIFICANT CHANGE UP (ref 70–99)
HCT VFR BLD CALC: 44.5 % — SIGNIFICANT CHANGE UP (ref 34.5–45)
HGB BLD-MCNC: 14.2 G/DL — SIGNIFICANT CHANGE UP (ref 11.5–15.5)
MCHC RBC-ENTMCNC: 26.6 PG — LOW (ref 27–34)
MCHC RBC-ENTMCNC: 31.9 GM/DL — LOW (ref 32–36)
MCV RBC AUTO: 83.3 FL — SIGNIFICANT CHANGE UP (ref 80–100)
NRBC # BLD: 0 /100 WBCS — SIGNIFICANT CHANGE UP
NRBC # FLD: 0 K/UL — SIGNIFICANT CHANGE UP
PLATELET # BLD AUTO: 257 K/UL — SIGNIFICANT CHANGE UP (ref 150–400)
POTASSIUM SERPL-MCNC: 4.7 MMOL/L — SIGNIFICANT CHANGE UP (ref 3.5–5.3)
POTASSIUM SERPL-SCNC: 4.7 MMOL/L — SIGNIFICANT CHANGE UP (ref 3.5–5.3)
RBC # BLD: 5.34 M/UL — HIGH (ref 3.8–5.2)
RBC # FLD: 14.4 % — SIGNIFICANT CHANGE UP (ref 10.3–14.5)
RH IG SCN BLD-IMP: POSITIVE — SIGNIFICANT CHANGE UP
SODIUM SERPL-SCNC: 141 MMOL/L — SIGNIFICANT CHANGE UP (ref 135–145)
WBC # BLD: 8.24 K/UL — SIGNIFICANT CHANGE UP (ref 3.8–10.5)
WBC # FLD AUTO: 8.24 K/UL — SIGNIFICANT CHANGE UP (ref 3.8–10.5)

## 2021-11-02 PROCEDURE — 93010 ELECTROCARDIOGRAM REPORT: CPT

## 2021-11-02 RX ORDER — BECLOMETHASONE DIPROPIONATE 40 UG/1
1 AEROSOL, METERED RESPIRATORY (INHALATION)
Qty: 0 | Refills: 0 | DISCHARGE

## 2021-11-02 RX ORDER — ALBUTEROL 90 UG/1
2 AEROSOL, METERED ORAL
Qty: 0 | Refills: 0 | DISCHARGE

## 2021-11-02 RX ORDER — LEVOCETIRIZINE DIHYDROCHLORIDE 0.5 MG/ML
1 SOLUTION ORAL
Qty: 0 | Refills: 0 | DISCHARGE

## 2021-11-02 RX ORDER — OMEGA-3 ACID ETHYL ESTERS 1 G
1 CAPSULE ORAL
Qty: 0 | Refills: 0 | DISCHARGE

## 2021-11-02 RX ORDER — KETOTIFEN FUMARATE 0.34 MG/ML
1 SOLUTION OPHTHALMIC
Qty: 0 | Refills: 0 | DISCHARGE

## 2021-11-02 RX ORDER — TRIAMTERENE/HYDROCHLOROTHIAZID 75 MG-50MG
1 TABLET ORAL
Qty: 0 | Refills: 0 | DISCHARGE

## 2021-11-02 RX ORDER — FLUTICASONE PROPIONATE 50 MCG
1 SPRAY, SUSPENSION NASAL
Qty: 0 | Refills: 0 | DISCHARGE

## 2021-11-02 RX ORDER — SODIUM CHLORIDE 9 MG/ML
1000 INJECTION, SOLUTION INTRAVENOUS
Refills: 0 | Status: DISCONTINUED | OUTPATIENT
Start: 2021-11-17 | End: 2021-11-18

## 2021-11-02 RX ORDER — TRIAMTERENE 100 MG/1
0 CAPSULE ORAL
Qty: 0 | Refills: 0 | DISCHARGE

## 2021-11-02 RX ORDER — ASCORBIC ACID 60 MG
1 TABLET,CHEWABLE ORAL
Qty: 0 | Refills: 0 | DISCHARGE

## 2021-11-02 RX ORDER — AMLODIPINE BESYLATE 2.5 MG/1
1 TABLET ORAL
Qty: 0 | Refills: 0 | DISCHARGE

## 2021-11-02 RX ORDER — FLUTICASONE PROPIONATE 220 MCG
1 AEROSOL WITH ADAPTER (GRAM) INHALATION
Qty: 0 | Refills: 0 | DISCHARGE

## 2021-11-02 NOTE — H&P PST ADULT - RS GEN PE MLT RESP DETAILS PC
clear to auscultation bilaterally/no rales/no rhonchi/no wheezes
Principal Discharge DX:	Cervical radiculopathy  Instructions for follow-up, activity and diet:	Follow with your PMD within 48-72 hours.  Rest, no heavy lifting.  Take Motrin 600 mg every 8 hours for pain with food, Valium 5mg every 8 hours as needed for muscle spasm- caution drowsiness /do not drive. Any worsening pain, weakness, numbness, bowel or urinary incontinence return to ER

## 2021-11-02 NOTE — H&P PST ADULT - NSICDXPASTSURGICALHX_GEN_ALL_CORE_FT
PAST SURGICAL HISTORY:  H/O thyroidectomy September 2020 malignant    History of bilateral tubal ligation 17 years ago    Normal vaginal delivery     S/P hysterectomy 2018

## 2021-11-02 NOTE — H&P PST ADULT - NSICDXPASTMEDICALHX_GEN_ALL_CORE_FT
PAST MEDICAL HISTORY:  Arthritis right knee    Asthma controlled meds denies any asthma attack    Hypertension     Neck mass 2021 malignant    Non morbid obesity     Osteoarthritis (arthritis due to wear and tear of joints) knees    Thyroid cancer diagnosed in 2020    Uterine leiomyoma, unspecified location s/p hysterectomy     PAST MEDICAL HISTORY:  Arthritis right knee    Asthma controlled meds denies any asthma attack    Hypertension     Morbid obesity     Neck mass 2021 malignant    Osteoarthritis (arthritis due to wear and tear of joints) knees    Thyroid cancer diagnosed in 2020    Uterine leiomyoma, unspecified location s/p hysterectomy

## 2021-11-02 NOTE — H&P PST ADULT - HISTORY OF PRESENT ILLNESS
This is a 45 y/o female who underwent thyroidectomy in September 2020 due to malignancy. Received radioactive iodine. During routine endocrinology exam, MD palpated right neck mass. Subsequent sonogram and biopsy revealed malignancy. Further intervention warranted. Scheduled for right modified radical neck dissection

## 2021-11-02 NOTE — H&P PST ADULT - PROBLEM SELECTOR PLAN 1
This is a 47 y/o female who is scheduled with right modified radical neck dissection on 11-17-21  * Instructed to speak with surgeon regarding covid testing  * Given preop and cleanser instructions with good teach back and patient verbalized understanding   * Instructed to take normal am dose of     the am of surgery This is a 47 y/o female who is scheduled with right modified radical neck dissection on 11-17-21  * Instructed to speak with surgeon regarding covid testing  * Given preop and cleanser instructions with good teach back and patient verbalized understanding   * Instructed to take normal am dose of valsartan and ventolin (if needed)  the am of surgery This is a 45 y/o female who is scheduled with right modified radical neck dissection on 11-17-21  * Instructed to speak with surgeon regarding covid testing  * Given preop and cleanser instructions with good teach back and patient verbalized understanding   * Instructed to take normal am dose of valsartan and ventolin (if needed)  the am of surgery  * Await echo done in 2021 through pcp, Dr. England

## 2021-11-14 ENCOUNTER — LABORATORY RESULT (OUTPATIENT)
Age: 46
End: 2021-11-14

## 2021-11-14 ENCOUNTER — APPOINTMENT (OUTPATIENT)
Dept: DISASTER EMERGENCY | Facility: CLINIC | Age: 46
End: 2021-11-14

## 2021-11-16 ENCOUNTER — TRANSCRIPTION ENCOUNTER (OUTPATIENT)
Age: 46
End: 2021-11-16

## 2021-11-16 NOTE — ASU PATIENT PROFILE, ADULT - NSICDXPASTMEDICALHX_GEN_ALL_CORE_FT
PAST MEDICAL HISTORY:  Arthritis right knee    Asthma controlled meds denies any asthma attack    Hypertension     Morbid obesity     Neck mass 2021 malignant    Osteoarthritis (arthritis due to wear and tear of joints) knees    Thyroid cancer diagnosed in 2020    Uterine leiomyoma, unspecified location s/p hysterectomy

## 2021-11-17 ENCOUNTER — RESULT REVIEW (OUTPATIENT)
Age: 46
End: 2021-11-17

## 2021-11-17 ENCOUNTER — INPATIENT (INPATIENT)
Facility: HOSPITAL | Age: 46
LOS: 0 days | Discharge: HOME | End: 2021-11-18
Attending: SPECIALIST | Admitting: SPECIALIST
Payer: MEDICAID

## 2021-11-17 ENCOUNTER — APPOINTMENT (OUTPATIENT)
Dept: SURGERY | Facility: HOSPITAL | Age: 46
End: 2021-11-17

## 2021-11-17 ENCOUNTER — TRANSCRIPTION ENCOUNTER (OUTPATIENT)
Age: 46
End: 2021-11-17

## 2021-11-17 VITALS
HEART RATE: 78 BPM | TEMPERATURE: 98 F | OXYGEN SATURATION: 95 % | RESPIRATION RATE: 16 BRPM | WEIGHT: 293 LBS | SYSTOLIC BLOOD PRESSURE: 131 MMHG | HEIGHT: 63.5 IN | DIASTOLIC BLOOD PRESSURE: 87 MMHG

## 2021-11-17 DIAGNOSIS — E89.0 POSTPROCEDURAL HYPOTHYROIDISM: Chronic | ICD-10-CM

## 2021-11-17 DIAGNOSIS — Z90.710 ACQUIRED ABSENCE OF BOTH CERVIX AND UTERUS: Chronic | ICD-10-CM

## 2021-11-17 DIAGNOSIS — C77.9 SECONDARY AND UNSPECIFIED MALIGNANT NEOPLASM OF LYMPH NODE, UNSPECIFIED: ICD-10-CM

## 2021-11-17 DIAGNOSIS — Z98.51 TUBAL LIGATION STATUS: Chronic | ICD-10-CM

## 2021-11-17 PROCEDURE — 93010 ELECTROCARDIOGRAM REPORT: CPT

## 2021-11-17 PROCEDURE — 38724 REMOVAL OF LYMPH NODES NECK: CPT

## 2021-11-17 PROCEDURE — 88307 TISSUE EXAM BY PATHOLOGIST: CPT | Mod: 26

## 2021-11-17 RX ORDER — IBUPROFEN 200 MG
1 TABLET ORAL
Qty: 0 | Refills: 0 | DISCHARGE

## 2021-11-17 RX ORDER — BENZOCAINE AND MENTHOL 5; 1 G/100ML; G/100ML
1 LIQUID ORAL
Refills: 0 | Status: DISCONTINUED | OUTPATIENT
Start: 2021-11-17 | End: 2021-11-17

## 2021-11-17 RX ORDER — ACETAMINOPHEN 500 MG
2 TABLET ORAL
Qty: 0 | Refills: 0 | DISCHARGE
Start: 2021-11-17

## 2021-11-17 RX ORDER — LEVOTHYROXINE SODIUM 125 MCG
50 TABLET ORAL DAILY
Refills: 0 | Status: DISCONTINUED | OUTPATIENT
Start: 2021-11-17 | End: 2021-11-18

## 2021-11-17 RX ORDER — IPRATROPIUM/ALBUTEROL SULFATE 18-103MCG
3 AEROSOL WITH ADAPTER (GRAM) INHALATION ONCE
Refills: 0 | Status: COMPLETED | OUTPATIENT
Start: 2021-11-17 | End: 2021-11-17

## 2021-11-17 RX ORDER — VALSARTAN 80 MG/1
160 TABLET ORAL DAILY
Refills: 0 | Status: DISCONTINUED | OUTPATIENT
Start: 2021-11-17 | End: 2021-11-18

## 2021-11-17 RX ORDER — LEVOTHYROXINE SODIUM 125 MCG
200 TABLET ORAL DAILY
Refills: 0 | Status: DISCONTINUED | OUTPATIENT
Start: 2021-11-17 | End: 2021-11-18

## 2021-11-17 RX ORDER — OMEGA-3 ACID ETHYL ESTERS 1 G
1 CAPSULE ORAL
Qty: 0 | Refills: 0 | DISCHARGE

## 2021-11-17 RX ORDER — BENZOCAINE AND MENTHOL 5; 1 G/100ML; G/100ML
1 LIQUID ORAL
Refills: 0 | Status: DISCONTINUED | OUTPATIENT
Start: 2021-11-17 | End: 2021-11-18

## 2021-11-17 RX ORDER — FLUTICASONE PROPIONATE 50 MCG
1 SPRAY, SUSPENSION NASAL
Refills: 0 | Status: DISCONTINUED | OUTPATIENT
Start: 2021-11-17 | End: 2021-11-18

## 2021-11-17 RX ORDER — OXYCODONE AND ACETAMINOPHEN 5; 325 MG/1; MG/1
1 TABLET ORAL EVERY 6 HOURS
Refills: 0 | Status: DISCONTINUED | OUTPATIENT
Start: 2021-11-17 | End: 2021-11-18

## 2021-11-17 RX ORDER — ACETAMINOPHEN 500 MG
650 TABLET ORAL EVERY 6 HOURS
Refills: 0 | Status: DISCONTINUED | OUTPATIENT
Start: 2021-11-17 | End: 2021-11-18

## 2021-11-17 RX ORDER — MOMETASONE FUROATE 220 UG/1
1 INHALANT RESPIRATORY (INHALATION) DAILY
Refills: 0 | Status: DISCONTINUED | OUTPATIENT
Start: 2021-11-17 | End: 2021-11-18

## 2021-11-17 RX ORDER — INFLUENZA VIRUS VACCINE 15; 15; 15; 15 UG/.5ML; UG/.5ML; UG/.5ML; UG/.5ML
0.5 SUSPENSION INTRAMUSCULAR ONCE
Refills: 0 | Status: DISCONTINUED | OUTPATIENT
Start: 2021-11-17 | End: 2021-11-18

## 2021-11-17 RX ORDER — ALBUTEROL 90 UG/1
2 AEROSOL, METERED ORAL EVERY 6 HOURS
Refills: 0 | Status: DISCONTINUED | OUTPATIENT
Start: 2021-11-17 | End: 2021-11-18

## 2021-11-17 RX ORDER — TRIAMTERENE/HYDROCHLOROTHIAZID 75 MG-50MG
1 TABLET ORAL DAILY
Refills: 0 | Status: DISCONTINUED | OUTPATIENT
Start: 2021-11-17 | End: 2021-11-18

## 2021-11-17 RX ADMIN — Medication 650 MILLIGRAM(S): at 23:20

## 2021-11-17 RX ADMIN — MOMETASONE FUROATE 1 PUFF(S): 220 INHALANT RESPIRATORY (INHALATION) at 17:52

## 2021-11-17 RX ADMIN — Medication 650 MILLIGRAM(S): at 22:50

## 2021-11-17 RX ADMIN — Medication 3 MILLILITER(S): at 16:38

## 2021-11-17 RX ADMIN — SODIUM CHLORIDE 50 MILLILITER(S): 9 INJECTION, SOLUTION INTRAVENOUS at 15:12

## 2021-11-17 NOTE — DISCHARGE NOTE PROVIDER - NSDCFUSCHEDAPPT_GEN_ALL_CORE_FT
ENEDINA GOETZ ; 11/23/2021 ; NPP [place] [address] [facility]  ENEDINA GOETZ ; 12/20/2021 ; NPP Med [facility] [address] [facility]

## 2021-11-17 NOTE — DISCHARGE NOTE PROVIDER - NSDCMRMEDTOKEN_GEN_ALL_CORE_FT
acetaminophen 325 mg oral tablet: 2 tab(s) orally every 6 hours, As needed, Moderate Pain (4 - 6)  fluticasone 50 mcg/inh nasal spray: 1 spray(s) nasal once a day, As Needed  levothyroxine 200 mcg (0.2 mg) oral tablet: 1 tab(s) orally once a day AM  levothyroxine 50 mcg (0.05 mg) oral tablet: 1 tab(s) orally once a day AM  magnesium: 1 tab(s) orally once a day  Qvar 80 mcg/inh inhalation aerosol: 1 puff(s) inhaled once a day (at bedtime)  triamterene-hydrochlorothiazide 37.5 mg-25 mg oral tablet: 1 tab(s) orally once a day AM  valsartan 160 mg oral tablet: 1 tab(s) orally once a day AM  ventolin two oral inhalation prn symptoms:   Vitamin C: 1 tab(s) orally once a day  Vitamin D3 50 mcg (2000 intl units) oral capsule: 1 cap(s) orally once a day

## 2021-11-17 NOTE — DISCHARGE NOTE PROVIDER - NSDCCPCAREPLAN_GEN_ALL_CORE_FT
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

## 2021-11-17 NOTE — CHART NOTE - NSCHARTNOTEFT_GEN_A_CORE
Surgery Post-Op Note    Pre-Op Dx: Papillary thyroid carcinoma  Procedure: Right modified neck dissection      Surgeon: Dr. Swanson; Dr. Solis    SUBJECTIVE:  Pt seen and examined at the bedside. Pt w/ no complaints. Denies F/C/N/V. Pain controlled with medication. No chest pain or shortness of breath. Patient O2 decreased to mid 80s requiring NC x 2. Anaesthesia attributing decrease to sleep apnea.     OBJECTIVE:  Vital Signs Last 24 Hrs  T(C): 36.6 (17 Nov 2021 18:00), Max: 36.9 (17 Nov 2021 08:10)  T(F): 97.9 (17 Nov 2021 18:00), Max: 98.4 (17 Nov 2021 08:10)  HR: 97 (17 Nov 2021 18:00) (66 - 98)  BP: 114/85 (17 Nov 2021 18:00) (98/48 - 131/87)  BP(mean): 95 (17 Nov 2021 18:00) (66 - 95)  RR: 19 (17 Nov 2021 18:00) (12 - 26)  SpO2: 98% (17 Nov 2021 18:00) (92% - 99%)    Physical Exam:  General: NAD, resting comfortably in bed  Neuro: A/O x 3, no focal deficits  Neck: right neck mild swelling, no erythema or drainage. Incision C/D/I. NOAM with minimal output, serosanguinous   Pulmonary: Nonlabored breathing, no respiratory distress  Cardiovascular: NSR  Abdominal: soft, NTTP, ND  Drains: NOAM with minimal output, serosanguinous on right side of neck  Extremities: WWP    LABS:            CAPILLARY BLOOD GLUCOSE            ABO Interpretation: B (11-17 @ 08:29)      IMAGING:    ASSESSMENT:46y Female now 4hours s/p right modified neck dissection for papillary thyroid carcinoma. Patient with moderate decrease in O2 sat requiring NCx2, but is asymptomatic and recovering without difficulty overall.     PLAN:  - Monitor O2, provide NC as necessary  - Pain control  - Encourage IS  - Nausea control PRN  - Monitor vitals  - Diet: IVF + CLD  - Monitor I+Os  - OOB/ Ambulate     Team Surgery

## 2021-11-17 NOTE — DISCHARGE NOTE PROVIDER - CARE PROVIDER_API CALL
Matt Swanson)  Plastic Surgery  [address] [place] [address] [address]  [place] [address]  Phone: [phone]  Fax: [phone]  Follow Up Time:

## 2021-11-18 ENCOUNTER — NON-APPOINTMENT (OUTPATIENT)
Age: 46
End: 2021-11-18

## 2021-11-18 ENCOUNTER — TRANSCRIPTION ENCOUNTER (OUTPATIENT)
Age: 46
End: 2021-11-18

## 2021-11-18 VITALS
TEMPERATURE: 98 F | HEART RATE: 61 BPM | DIASTOLIC BLOOD PRESSURE: 72 MMHG | OXYGEN SATURATION: 96 % | RESPIRATION RATE: 18 BRPM | SYSTOLIC BLOOD PRESSURE: 121 MMHG

## 2021-11-18 RX ADMIN — BENZOCAINE AND MENTHOL 1 LOZENGE: 5; 1 LIQUID ORAL at 05:07

## 2021-11-18 RX ADMIN — Medication 200 MICROGRAM(S): at 05:06

## 2021-11-18 RX ADMIN — Medication 1 TABLET(S): at 05:08

## 2021-11-18 RX ADMIN — BENZOCAINE AND MENTHOL 1 LOZENGE: 5; 1 LIQUID ORAL at 08:01

## 2021-11-18 RX ADMIN — MOMETASONE FUROATE 1 PUFF(S): 220 INHALANT RESPIRATORY (INHALATION) at 10:53

## 2021-11-18 RX ADMIN — VALSARTAN 160 MILLIGRAM(S): 80 TABLET ORAL at 05:07

## 2021-11-18 RX ADMIN — Medication 50 MICROGRAM(S): at 05:07

## 2021-11-18 NOTE — PROGRESS NOTE ADULT - SUBJECTIVE AND OBJECTIVE BOX
SUBJECTIVE:   Seen and examined at bedside. Overnight, tachycardic in the 100s upon transfer to the floor. EKG obtained with sinus tachycardia. Reported receiving some bad news about a family relative and felt emotional over the phone with her . Vitals rechecked in an hr and WNL.    OBJECTIVE: T(C): 36.8 (11-18-21 @ 02:03), Max: 36.9 (11-17-21 @ 08:10)  HR: 77 (11-18-21 @ 02:03) (66 - 102)  BP: 132/74 (11-18-21 @ 02:03) (98/48 - 132/74)  RR: 18 (11-18-21 @ 02:03) (12 - 26)  SpO2: 98% (11-18-21 @ 02:03) (92% - 99%)  Wt(kg): --  I&O's Summary    17 Nov 2021 07:01  -  18 Nov 2021 03:32  --------------------------------------------------------  IN: 900 mL / OUT: 252.5 mL / NET: 647.5 mL      I&O's Detail    17 Nov 2021 07:01  -  18 Nov 2021 03:32  --------------------------------------------------------  IN:    Lactated Ringers: 700 mL    Oral Fluid: 200 mL  Total IN: 900 mL    OUT:    Bulb (mL): 52.5 mL    IV PiggyBack: 0 mL    Voided (mL): 200 mL  Total OUT: 252.5 mL    Total NET: 647.5 mL        Physical Exam:  General: NAD, resting comfortably in bed  Neuro: A/O x 3, no focal deficits  Neck: right neck mild swelling, no erythema or drainage. Incision C/D/I. NOAM with minimal output, serosanguinous   Pulmonary: Nonlabored breathing, no respiratory distress  Cardiovascular: NSR    MEDICATIONS  (STANDING):  benzocaine 15 mG/menthol 3.6 mG Lozenge 1 Lozenge Oral every 2 hours  influenza   Vaccine 0.5 milliLiter(s) IntraMuscular once  lactated ringers. 1000 milliLiter(s) (50 mL/Hr) IV Continuous <Continuous>  levothyroxine 200 MICROGram(s) Oral daily  levothyroxine 50 MICROGram(s) Oral daily  mometasone 220 MICROgram(s) Inhaler 1 Puff(s) Inhalation daily  triamterene 37.5 mG/hydrochlorothiazide 25 mG Tablet 1 Tablet(s) Oral daily  valsartan 160 milliGRAM(s) Oral daily    MEDICATIONS  (PRN):  acetaminophen     Tablet .. 650 milliGRAM(s) Oral every 6 hours PRN Moderate Pain (4 - 6)  ALBUTerol    90 MICROgram(s) HFA Inhaler 2 Puff(s) Inhalation every 6 hours PRN Shortness of Breath and/or Wheezing  fluticasone propionate 50 MICROgram(s)/spray Nasal Spray 1 Spray(s) Both Nostrils two times a day PRN shortness of breath  oxycodone    5 mG/acetaminophen 325 mG 1 Tablet(s) Oral every 6 hours PRN Severe Pain (7 - 10)      LABS:                
1 day s/p neck dissection. afebrile. no collections.  now taking adequate po.  discharge home with NOAM. f/u in office. to continue synthroid

## 2021-11-18 NOTE — DISCHARGE NOTE NURSING/CASE MANAGEMENT/SOCIAL WORK - PATIENT PORTAL LINK FT
You can access the FollowMyHealth Patient Portal offered by [facility] by registering at the following website: [email or web address]. By joining [facility]’s FollowMyHealth portal, you will also be able to view your health information using other applications (apps) compatible with our system.

## 2021-11-18 NOTE — PROGRESS NOTE ADULT - ASSESSMENT
ASSESSMENT:  46y Female  POD#1 s/p right modified neck dissection for papillary thyroid carcinoma. Patient with moderate decrease in O2 sat requiring NCx2, but is asymptomatic and recovering without difficulty overall.     PLAN:  - Monitor O2, provide NC as necessary  - Pain control  - Encourage IS  - Nausea control PRN  - Monitor vitals  - Diet: IVF + CLD  - Monitor I+Os  - OOB/ Ambulate    C Team Surgery  [id number]

## 2021-11-18 NOTE — PROVIDER CONTACT NOTE (OTHER) - ASSESSMENT
Pt A&Ox4, remained hemodynamically stable and vital signs within normal limit expect for HR, pt denies pain or any distress.
pt remains stable. no labs were ordered for calcium levels

## 2021-11-22 LAB — SURGICAL PATHOLOGY STUDY: SIGNIFICANT CHANGE UP

## 2021-11-23 ENCOUNTER — APPOINTMENT (OUTPATIENT)
Dept: SURGERY | Facility: CLINIC | Age: 46
End: 2021-11-23
Payer: MEDICAID

## 2021-11-23 PROBLEM — R22.1 LOCALIZED SWELLING, MASS AND LUMP, NECK: Chronic | Status: ACTIVE | Noted: 2021-11-02

## 2021-11-23 PROBLEM — E66.01 MORBID (SEVERE) OBESITY DUE TO EXCESS CALORIES: Chronic | Status: ACTIVE | Noted: 2021-11-02

## 2021-11-23 PROBLEM — C73 MALIGNANT NEOPLASM OF THYROID GLAND: Chronic | Status: ACTIVE | Noted: 2020-07-29

## 2021-11-23 PROBLEM — M19.90 UNSPECIFIED OSTEOARTHRITIS, UNSPECIFIED SITE: Chronic | Status: ACTIVE | Noted: 2021-11-02

## 2021-11-23 PROCEDURE — 99024 POSTOP FOLLOW-UP VISIT: CPT

## 2021-11-23 NOTE — HISTORY OF PRESENT ILLNESS
[de-identified] : 1 week s/p right neck dissection for metastatic thyroid carcinoma, 1 year s/p total thyroidectomy with postop KAHN.   denies pain, drainage, infection. no changes medically since last visit.  I have reviewed all old and new data and available images.

## 2021-11-23 NOTE — ASSESSMENT
[FreeTextEntry1] : drain removed. instructed in maneuvers to minimize scarring.  pathology report discussed. to see dr salinas regarding decision about KAHN.  to return earlier if any change. patient has been given the opportunity to ask questions, and all of the patient's questions have been answered to their satisfaction

## 2021-11-23 NOTE — PHYSICAL EXAM
[de-identified] : minimal operative site swelling [de-identified] : no palpable thyroid nodules  [Midline] : located in midline position [Normal] : orientation to person, place, and time: normal

## 2021-12-16 ENCOUNTER — APPOINTMENT (OUTPATIENT)
Dept: SURGERY | Facility: CLINIC | Age: 46
End: 2021-12-16
Payer: MEDICAID

## 2021-12-16 PROCEDURE — 99024 POSTOP FOLLOW-UP VISIT: CPT

## 2021-12-16 NOTE — HISTORY OF PRESENT ILLNESS
[de-identified] : Pt 4 weeks s/p Right neck dissection c/o right neck pain and swelling denies fever

## 2021-12-16 NOTE — PHYSICAL EXAM
[de-identified] : Right neck firm fullness no erythema, mild edema [Midline] : located in midline position [Normal] : orientation to person, place, and time: normal

## 2021-12-16 NOTE — ASSESSMENT
[FreeTextEntry1] : s/p Right neck dissection\par s/w Dr Swanson\par Augmentin BID 10 days\par sonogram\par f/u as scheduled 12/28/21 sooner if needed

## 2021-12-18 ENCOUNTER — INPATIENT (INPATIENT)
Facility: HOSPITAL | Age: 46
LOS: 1 days | Discharge: HOME | End: 2021-12-20
Attending: SPECIALIST | Admitting: SPECIALIST
Payer: MEDICAID

## 2021-12-18 VITALS
HEART RATE: 101 BPM | RESPIRATION RATE: 12 BRPM | HEIGHT: 63.5 IN | DIASTOLIC BLOOD PRESSURE: 77 MMHG | TEMPERATURE: 98 F | SYSTOLIC BLOOD PRESSURE: 122 MMHG | OXYGEN SATURATION: 99 %

## 2021-12-18 DIAGNOSIS — L02.11 CUTANEOUS ABSCESS OF NECK: ICD-10-CM

## 2021-12-18 DIAGNOSIS — E89.0 POSTPROCEDURAL HYPOTHYROIDISM: Chronic | ICD-10-CM

## 2021-12-18 DIAGNOSIS — Z98.51 TUBAL LIGATION STATUS: Chronic | ICD-10-CM

## 2021-12-18 DIAGNOSIS — Z90.710 ACQUIRED ABSENCE OF BOTH CERVIX AND UTERUS: Chronic | ICD-10-CM

## 2021-12-18 LAB
ALBUMIN SERPL ELPH-MCNC: 3.9 G/DL — SIGNIFICANT CHANGE UP (ref 3.3–5)
ALP SERPL-CCNC: 80 U/L — SIGNIFICANT CHANGE UP (ref 40–120)
ALT FLD-CCNC: 27 U/L — SIGNIFICANT CHANGE UP (ref 4–33)
ANION GAP SERPL CALC-SCNC: 10 MMOL/L — SIGNIFICANT CHANGE UP (ref 7–14)
APTT BLD: 30.4 SEC — SIGNIFICANT CHANGE UP (ref 27–36.3)
AST SERPL-CCNC: 19 U/L — SIGNIFICANT CHANGE UP (ref 4–32)
BASOPHILS # BLD AUTO: 0.04 K/UL — SIGNIFICANT CHANGE UP (ref 0–0.2)
BASOPHILS NFR BLD AUTO: 0.4 % — SIGNIFICANT CHANGE UP (ref 0–2)
BILIRUB SERPL-MCNC: 0.2 MG/DL — SIGNIFICANT CHANGE UP (ref 0.2–1.2)
BUN SERPL-MCNC: 13 MG/DL — SIGNIFICANT CHANGE UP (ref 7–23)
CALCIUM SERPL-MCNC: 9.9 MG/DL — SIGNIFICANT CHANGE UP (ref 8.4–10.5)
CHLORIDE SERPL-SCNC: 99 MMOL/L — SIGNIFICANT CHANGE UP (ref 98–107)
CO2 SERPL-SCNC: 32 MMOL/L — HIGH (ref 22–31)
CREAT SERPL-MCNC: 0.5 MG/DL — SIGNIFICANT CHANGE UP (ref 0.5–1.3)
EOSINOPHIL # BLD AUTO: 0.08 K/UL — SIGNIFICANT CHANGE UP (ref 0–0.5)
EOSINOPHIL NFR BLD AUTO: 0.7 % — SIGNIFICANT CHANGE UP (ref 0–6)
GLUCOSE SERPL-MCNC: 111 MG/DL — HIGH (ref 70–99)
HCG SERPL-ACNC: <5 MIU/ML — SIGNIFICANT CHANGE UP
HCT VFR BLD CALC: 40.9 % — SIGNIFICANT CHANGE UP (ref 34.5–45)
HGB BLD-MCNC: 12.3 G/DL — SIGNIFICANT CHANGE UP (ref 11.5–15.5)
IANC: 8.96 K/UL — HIGH (ref 1.5–8.5)
IMM GRANULOCYTES NFR BLD AUTO: 0.6 % — SIGNIFICANT CHANGE UP (ref 0–1.5)
INR BLD: 1.22 RATIO — HIGH (ref 0.88–1.16)
LACTATE SERPL-SCNC: 0.5 MMOL/L — SIGNIFICANT CHANGE UP (ref 0.5–2)
LYMPHOCYTES # BLD AUTO: 1.53 K/UL — SIGNIFICANT CHANGE UP (ref 1–3.3)
LYMPHOCYTES # BLD AUTO: 13.4 % — SIGNIFICANT CHANGE UP (ref 13–44)
MCHC RBC-ENTMCNC: 26 PG — LOW (ref 27–34)
MCHC RBC-ENTMCNC: 30.1 GM/DL — LOW (ref 32–36)
MCV RBC AUTO: 86.5 FL — SIGNIFICANT CHANGE UP (ref 80–100)
MONOCYTES # BLD AUTO: 0.71 K/UL — SIGNIFICANT CHANGE UP (ref 0–0.9)
MONOCYTES NFR BLD AUTO: 6.2 % — SIGNIFICANT CHANGE UP (ref 2–14)
NEUTROPHILS # BLD AUTO: 8.96 K/UL — HIGH (ref 1.8–7.4)
NEUTROPHILS NFR BLD AUTO: 78.7 % — HIGH (ref 43–77)
NRBC # BLD: 0 /100 WBCS — SIGNIFICANT CHANGE UP
NRBC # FLD: 0 K/UL — SIGNIFICANT CHANGE UP
PLATELET # BLD AUTO: 277 K/UL — SIGNIFICANT CHANGE UP (ref 150–400)
POTASSIUM SERPL-MCNC: 4.2 MMOL/L — SIGNIFICANT CHANGE UP (ref 3.5–5.3)
POTASSIUM SERPL-SCNC: 4.2 MMOL/L — SIGNIFICANT CHANGE UP (ref 3.5–5.3)
PROT SERPL-MCNC: 8 G/DL — SIGNIFICANT CHANGE UP (ref 6–8.3)
PROTHROM AB SERPL-ACNC: 13.9 SEC — HIGH (ref 10.6–13.6)
RBC # BLD: 4.73 M/UL — SIGNIFICANT CHANGE UP (ref 3.8–5.2)
RBC # FLD: 14 % — SIGNIFICANT CHANGE UP (ref 10.3–14.5)
SARS-COV-2 RNA SPEC QL NAA+PROBE: SIGNIFICANT CHANGE UP
SODIUM SERPL-SCNC: 141 MMOL/L — SIGNIFICANT CHANGE UP (ref 135–145)
T4 AB SER-ACNC: 11.31 UG/DL — SIGNIFICANT CHANGE UP (ref 5.1–13)
TSH SERPL-MCNC: 1.47 UIU/ML — SIGNIFICANT CHANGE UP (ref 0.27–4.2)
WBC # BLD: 11.39 K/UL — HIGH (ref 3.8–10.5)
WBC # FLD AUTO: 11.39 K/UL — HIGH (ref 3.8–10.5)

## 2021-12-18 PROCEDURE — 70491 CT SOFT TISSUE NECK W/DYE: CPT | Mod: 26,MA

## 2021-12-18 PROCEDURE — 99285 EMERGENCY DEPT VISIT HI MDM: CPT

## 2021-12-18 RX ORDER — BECLOMETHASONE DIPROPIONATE 40 UG/1
1 AEROSOL, METERED RESPIRATORY (INHALATION)
Qty: 0 | Refills: 0 | DISCHARGE

## 2021-12-18 RX ORDER — ASCORBIC ACID 60 MG
1 TABLET,CHEWABLE ORAL
Qty: 0 | Refills: 0 | DISCHARGE

## 2021-12-18 RX ORDER — ACETAMINOPHEN 500 MG
1000 TABLET ORAL EVERY 6 HOURS
Refills: 0 | Status: DISCONTINUED | OUTPATIENT
Start: 2021-12-18 | End: 2021-12-20

## 2021-12-18 RX ORDER — IBUPROFEN 200 MG
400 TABLET ORAL EVERY 6 HOURS
Refills: 0 | Status: DISCONTINUED | OUTPATIENT
Start: 2021-12-18 | End: 2021-12-20

## 2021-12-18 RX ORDER — LEVOTHYROXINE SODIUM 125 MCG
250 TABLET ORAL DAILY
Refills: 0 | Status: DISCONTINUED | OUTPATIENT
Start: 2021-12-18 | End: 2021-12-20

## 2021-12-18 RX ORDER — MOMETASONE FUROATE 220 UG/1
2 INHALANT RESPIRATORY (INHALATION) DAILY
Refills: 0 | Status: DISCONTINUED | OUTPATIENT
Start: 2021-12-18 | End: 2021-12-19

## 2021-12-18 RX ORDER — DEXAMETHASONE 0.5 MG/5ML
10 ELIXIR ORAL ONCE
Refills: 0 | Status: COMPLETED | OUTPATIENT
Start: 2021-12-18 | End: 2021-12-18

## 2021-12-18 RX ORDER — TRIAMTERENE/HYDROCHLOROTHIAZID 75 MG-50MG
1 TABLET ORAL DAILY
Refills: 0 | Status: DISCONTINUED | OUTPATIENT
Start: 2021-12-18 | End: 2021-12-20

## 2021-12-18 RX ORDER — TRIAMTERENE/HYDROCHLOROTHIAZID 75 MG-50MG
1 TABLET ORAL
Qty: 0 | Refills: 0 | DISCHARGE

## 2021-12-18 RX ORDER — VALSARTAN 80 MG/1
1 TABLET ORAL
Qty: 0 | Refills: 0 | DISCHARGE

## 2021-12-18 RX ORDER — HEPARIN SODIUM 5000 [USP'U]/ML
5000 INJECTION INTRAVENOUS; SUBCUTANEOUS EVERY 8 HOURS
Refills: 0 | Status: DISCONTINUED | OUTPATIENT
Start: 2021-12-18 | End: 2021-12-19

## 2021-12-18 RX ORDER — CHOLECALCIFEROL (VITAMIN D3) 125 MCG
1 CAPSULE ORAL
Qty: 0 | Refills: 0 | DISCHARGE

## 2021-12-18 RX ORDER — FLUTICASONE PROPIONATE 50 MCG
1 SPRAY, SUSPENSION NASAL
Refills: 0 | Status: DISCONTINUED | OUTPATIENT
Start: 2021-12-18 | End: 2021-12-20

## 2021-12-18 RX ORDER — FLUTICASONE PROPIONATE 50 MCG
1 SPRAY, SUSPENSION NASAL
Qty: 0 | Refills: 0 | DISCHARGE

## 2021-12-18 RX ORDER — PIPERACILLIN AND TAZOBACTAM 4; .5 G/20ML; G/20ML
3.38 INJECTION, POWDER, LYOPHILIZED, FOR SOLUTION INTRAVENOUS EVERY 8 HOURS
Refills: 0 | Status: DISCONTINUED | OUTPATIENT
Start: 2021-12-18 | End: 2021-12-20

## 2021-12-18 RX ORDER — SODIUM CHLORIDE 9 MG/ML
500 INJECTION INTRAMUSCULAR; INTRAVENOUS; SUBCUTANEOUS ONCE
Refills: 0 | Status: COMPLETED | OUTPATIENT
Start: 2021-12-18 | End: 2021-12-18

## 2021-12-18 RX ORDER — LEVOTHYROXINE SODIUM 125 MCG
1 TABLET ORAL
Qty: 0 | Refills: 0 | DISCHARGE

## 2021-12-18 RX ORDER — VALSARTAN 80 MG/1
160 TABLET ORAL DAILY
Refills: 0 | Status: DISCONTINUED | OUTPATIENT
Start: 2021-12-18 | End: 2021-12-20

## 2021-12-18 RX ORDER — KETOROLAC TROMETHAMINE 30 MG/ML
15 SYRINGE (ML) INJECTION ONCE
Refills: 0 | Status: DISCONTINUED | OUTPATIENT
Start: 2021-12-18 | End: 2021-12-18

## 2021-12-18 RX ADMIN — SODIUM CHLORIDE 500 MILLILITER(S): 9 INJECTION INTRAMUSCULAR; INTRAVENOUS; SUBCUTANEOUS at 12:29

## 2021-12-18 RX ADMIN — Medication 15 MILLIGRAM(S): at 12:28

## 2021-12-18 RX ADMIN — Medication 15 MILLIGRAM(S): at 13:22

## 2021-12-18 RX ADMIN — PIPERACILLIN AND TAZOBACTAM 25 GRAM(S): 4; .5 INJECTION, POWDER, LYOPHILIZED, FOR SOLUTION INTRAVENOUS at 22:45

## 2021-12-18 RX ADMIN — Medication 102 MILLIGRAM(S): at 12:28

## 2021-12-18 RX ADMIN — Medication 600 MILLIGRAM(S): at 13:21

## 2021-12-18 RX ADMIN — Medication 100 MILLIGRAM(S): at 12:29

## 2021-12-18 RX ADMIN — HEPARIN SODIUM 5000 UNIT(S): 5000 INJECTION INTRAVENOUS; SUBCUTANEOUS at 22:46

## 2021-12-18 RX ADMIN — Medication 10 MILLIGRAM(S): at 13:22

## 2021-12-18 RX ADMIN — SODIUM CHLORIDE 500 MILLILITER(S): 9 INJECTION INTRAMUSCULAR; INTRAVENOUS; SUBCUTANEOUS at 13:22

## 2021-12-18 NOTE — CONSULT NOTE ADULT - SUBJECTIVE AND OBJECTIVE BOX
Interventional Radiology    Evaluate for Procedure:     HPI: 46y Female with hx of thyroid cancer s/p right neck surgical dissection 3 weeks ago w right neck pain, CT done shows right neck collection, ddx includes post op seroma or abscess.     Allergies:   Medications (Abx/Cardiac/Anticoagulation/Blood Products)    clindamycin IVPB: 100 mL/Hr IV Intermittent (12-18 @ 12:29)    Data:  161.3  T(C): 36.4  HR: 88  BP: 131/74  RR: 16  SpO2: 100%    -WBC 11.39 / HgB 12.3 / Hct 40.9 / Plt 277  -Na 141 / Cl 99 / BUN 13 / Glucose 111  -K 4.2 / CO2 32 / Cr 0.50  -ALT 27 / Alk Phos 80 / T.Bili 0.2  -INR 1.22 / PTT 30.4      Radiology:     Assessment/Plan:     -- IR will plan to perform right neck collection aspiration/ possible drain placement on Monday 12/20  -- NPO at midnight on Sunday  -- hold a.m. anticoagulation on Sunday PM and Monday AM  -- please maintain COVID PCR within 72 hours of planned procedure   -- please place IR procedure request order under Dr. Venecia Ron  -- AM labs including coags

## 2021-12-18 NOTE — ED PROVIDER NOTE - OBJECTIVE STATEMENT
46 year-old female, history of papillary thyroid cancer, s/p R sided neck dissection surgery 1 month ago, presents with cc R sided neck swelling and worsening pain x ~1 week. Pain is worse with head movement, temporarily relieved with Tylenol. Associated with chills but no fever. Also reports right sided throat pain. No difficulty swallowing or breathing. No feeling or throat closing. Pain radiates to the R side of head and R ear. Still has R sided lateral neck numbness (from after surgery). Denies any chest pain, SOB, rash or any other complaints. Was seen by Dr Swanson (endocrine surgeon) 2 days ago and was given Amox which she took x 2 days.

## 2021-12-18 NOTE — ED PROVIDER NOTE - ATTENDING CONTRIBUTION TO CARE
I have personally performed a face to face medical and diagnostic evaluation of the patient. I have discussed with and reviewed the ACP's note and agree with the History, ROS, Physical Exam and MDM unless otherwise indicated. A brief summary of my personal evaluation and impression can be found below.    46F hx of recent thyroid/lymph node removal approx x1 month ago presents with a cc of worsening R sided neck swelling x1 week seen by outpt surg team this week was started on amox however getting worse prompting ED visit, no fevers, but subjective chills at home, limited neck ROM 2/2 swelling, notes mild R ear pain as well, no changes in voice, handling secretions and tolerating PO. Feels as if pain is radiating to post neck as well. Denies n/v/p/sob. Denies headache, syncope, lightheadedness, dizziness. Denies chest palpitations, abdominal pain. Denies edema. Denies dysuria, hematuria, BRBPR, tarry stools, diarrhea, constipation. No arm pain.     All other ROS negative, except as above and as per HPI and ROS section.    VITALS: Initial triage and subsequent vitals have been reviewed by me.  GEN APPEARANCE: WDWN, alert, non-toxic, NAD  HEAD: Atraumatic.  EARS: TM clear b/l, external anatomy appears normal. No mastoid ttp.  MOUTH: MMM, no tonsillar erythema, swelling or exudates, protecting airway, handling secretions.  EYES: PERRLa, EOMI, vision grossly intact.   NECK: Supple R sub mandibular large area of swelling, induration, warm to touch but non erythematous.  CV: RRR, S1S2, no c/r/m/g. Cap refill <2 seconds. No bruits.   LUNGS: CTAB. No abnormal breath sounds.  ABDOMEN: Soft, NTND. No guarding or rebound.   MSK/EXT: No spinal or extremity point tenderness. No CVA ttp. Pelvis stable. No peripheral edema.  NEURO: Alert, follows commands. Weight bearing normal. Speech normal. Sensation and motor normal x4 extremities.   SKIN: Warm, dry and intact. No rash.  PSYCH: Appropriate    Plan/MDM: 46F hx of recent thyroid/lymph node removal approx x1 month ago presents with a cc of worsening R sided neck swelling x1 week seen by outpt surg team this week was started on amox however getting worse prompting ED visit, no fevers, but subjective chills at home, limited neck ROM 2/2 swelling, notes mild R ear pain as well, no changes in voice, handling secretions and tolerating PO. Feels as if pain is radiating to post neck as well exam vss non toxic mild tachycardic PE as above ddx c/f infectious process consider ludwigs but more likely post operative seroma or abscess?, superior vena cava syndrome also possible though less concern at this time, will check labs ct give abx steroids, discuss w surgical team, reassess thereafter, likely cdu/admit.

## 2021-12-18 NOTE — ED ADULT NURSE NOTE - OBJECTIVE STATEMENT
PT si a 46 year old female reporting to the ED for lower jaw swelling radiating to neck. Pt endorsees a headache. PMH of thyroidectomy 1 year ago and nodes removed ~ 2 weeks ago. Swelling started monday. no drooling or stidor noted, speaking in full sentences. Pt is AOX4. Pt deneis chest pain or SOB. PT respirations even an unlabored. Pt appears to be comfortable, in NAD. Pt deneis fever, chills, n/v/d. Pt denies abdominal pain, dysuria, hematuria.

## 2021-12-18 NOTE — H&P ADULT - NSHPLABSRESULTS_GEN_ALL_CORE
T(C): 36.4 (12-18-21 @ 15:19), Max: 36.5 (12-18-21 @ 10:53)  HR: 88 (12-18-21 @ 15:19) (88 - 101)  BP: 131/74 (12-18-21 @ 15:19) (122/77 - 131/74)  RR: 16 (12-18-21 @ 15:19) (12 - 16)  SpO2: 100% (12-18-21 @ 15:19) (99% - 100%)    LABS:                        12.3   11.39 )-----------( 277      ( 18 Dec 2021 12:26 )             40.9     18 Dec 2021 12:26    141    |  99     |  13     ----------------------------<  111    4.2     |  32     |  0.50     Ca    9.9        18 Dec 2021 12:26    TPro  8.0    /  Alb  3.9    /  TBili  0.2    /  DBili  x      /  AST  19     /  ALT  27     /  AlkPhos  80     18 Dec 2021 12:26    PT/INR - ( 18 Dec 2021 12:26 )   PT: 13.9 sec;   INR: 1.22 ratio         PTT - ( 18 Dec 2021 12:26 )  PTT:30.4 sec      CT Neck Soft Tissue w/ IV Cont (12.18.21 @ 14:34)    FINDINGS:    There is a peripherally enhancing fluid collection within the right   posterior cervical space extending from the level of the hyoid to the   cricoid and measuring 3.6 x 2.8 x 5.7 cm. A focus of air is seen within   the nondependent portion of the collection or adjacent soft tissues.   There is associated lateral bowing of the adjacent sternocleidomastoid   and compression of the internal jugularvein which is, nonetheless,   patent.    AERODIGESTIVE TRACT:  Normal.  LYMPH NODES:  A soft tissue nodule is seen in the thyroidectomy bed to   the left of the trachea (2:73) measuring 0.9 x 0.6 cm in size likely   representing a prominent sized level VI lymph node. A level IV node at   the upper limits of normal on the left (2:69) measures 1 x 0.8 cm. A   mildly enlarged right lateral retropharyngeal node measures 0.9 x 0.6 cm.  PAROTID GLANDS:  Normal.  SUBMANDIBULAR GLANDS:  Normal.  THYROID GLAND:  Status post total thyroidectomy  VISUALIZED PARANASAL SINUSES:  Clear.  VISUALIZED TYMPANOMASTOID CAVITIES: Clear.  BONES:  Normal.  MISCELLANEOUS:  The sella turcica is enlarged and CSF filled compatible   with an empty sella.    IMPRESSION:  Right posterior cervical space fluid collection which may represent a   postoperative seroma or abscess.    Scattered mildly enlarged/prominent sized lymph nodes as described above.

## 2021-12-18 NOTE — H&P ADULT - HISTORY OF PRESENT ILLNESS
46F w/ hx of HTN, papillary thyroid carcinoma s/p total thyroidectomy in Aug 2020 and recent right modified radical neck dissection for biopsy proven metastatic node on 11/17/21 presenting with right neck swelling and pain. The pain began 1 week ago and has worsened in the past day. Also reports chills. Denies dysphagia or SOB. She was prescribed Augmentin two days ago.    In the ED she is afebrile, hemodynamically normal, WBC 11, and CT shows 5.7 x 3.6 cm peripherally enhancing right neck collection.

## 2021-12-18 NOTE — H&P ADULT - ASSESSMENT
46F w/ hx of HTN, papillary thyroid carcinoma s/p total thyroidectomy in Aug 2020 and right modified radical neck dissection on 11/17/21 presenting with right neck swelling and pain and CT w/ 5.7cm collection    - Admit to surgery  - Abx for possible abscess  - IR consult for aspiration and possible drain placement  - Home BP and thyroid medications  - Discussed with Dr. Sky TORRES team surgery  Pager [phone]

## 2021-12-18 NOTE — ED ADULT TRIAGE NOTE - CHIEF COMPLAINT QUOTE
pt ambulatory to walk in triage c/o increased neck swelling at site of lymph node removal 1 month ago, went to PMH started on amoxicillin, pain 10/10 at this time took Tylenol this am with no change. denies difficulty speaking, swallowing , breathing PMH thyroid Ca with thyroidectomy, HTN

## 2021-12-18 NOTE — ED PROVIDER NOTE - CLINICAL SUMMARY MEDICAL DECISION MAKING FREE TEXT BOX
46 year-old female, presents with cc R sided neck pain/swelling, getting worse. Well-appearing, no signs of distress, vital signs within normal limits, afebrile. Plan: CT r/o abscess, labs, IVF, meds, surg consult, re-assess.

## 2021-12-18 NOTE — ED PROVIDER NOTE - PHYSICAL EXAMINATION
Limited ROM of neck when turning to R side.  No rash to scalp. No mastoid tenderness or discoloration.  R lateral neck with large hard mass with erythema/induration. No fluctuance.

## 2021-12-19 LAB
ANION GAP SERPL CALC-SCNC: 11 MMOL/L — SIGNIFICANT CHANGE UP (ref 7–14)
APTT BLD: 28.6 SEC — SIGNIFICANT CHANGE UP (ref 27–36.3)
BUN SERPL-MCNC: 16 MG/DL — SIGNIFICANT CHANGE UP (ref 7–23)
CALCIUM SERPL-MCNC: 9.5 MG/DL — SIGNIFICANT CHANGE UP (ref 8.4–10.5)
CHLORIDE SERPL-SCNC: 100 MMOL/L — SIGNIFICANT CHANGE UP (ref 98–107)
CO2 SERPL-SCNC: 30 MMOL/L — SIGNIFICANT CHANGE UP (ref 22–31)
CREAT SERPL-MCNC: 0.5 MG/DL — SIGNIFICANT CHANGE UP (ref 0.5–1.3)
GLUCOSE SERPL-MCNC: 115 MG/DL — HIGH (ref 70–99)
HCT VFR BLD CALC: 36.8 % — SIGNIFICANT CHANGE UP (ref 34.5–45)
HGB BLD-MCNC: 11.6 G/DL — SIGNIFICANT CHANGE UP (ref 11.5–15.5)
INR BLD: 1.24 RATIO — HIGH (ref 0.88–1.16)
MAGNESIUM SERPL-MCNC: 2.2 MG/DL — SIGNIFICANT CHANGE UP (ref 1.6–2.6)
MCHC RBC-ENTMCNC: 26.3 PG — LOW (ref 27–34)
MCHC RBC-ENTMCNC: 31.5 GM/DL — LOW (ref 32–36)
MCV RBC AUTO: 83.4 FL — SIGNIFICANT CHANGE UP (ref 80–100)
NRBC # BLD: 0 /100 WBCS — SIGNIFICANT CHANGE UP
NRBC # FLD: 0 K/UL — SIGNIFICANT CHANGE UP
PHOSPHATE SERPL-MCNC: 4.2 MG/DL — SIGNIFICANT CHANGE UP (ref 2.5–4.5)
PLATELET # BLD AUTO: 280 K/UL — SIGNIFICANT CHANGE UP (ref 150–400)
POTASSIUM SERPL-MCNC: 3.9 MMOL/L — SIGNIFICANT CHANGE UP (ref 3.5–5.3)
POTASSIUM SERPL-SCNC: 3.9 MMOL/L — SIGNIFICANT CHANGE UP (ref 3.5–5.3)
PROTHROM AB SERPL-ACNC: 14.1 SEC — HIGH (ref 10.6–13.6)
RBC # BLD: 4.41 M/UL — SIGNIFICANT CHANGE UP (ref 3.8–5.2)
RBC # FLD: 14 % — SIGNIFICANT CHANGE UP (ref 10.3–14.5)
SODIUM SERPL-SCNC: 141 MMOL/L — SIGNIFICANT CHANGE UP (ref 135–145)
WBC # BLD: 9.97 K/UL — SIGNIFICANT CHANGE UP (ref 3.8–10.5)
WBC # FLD AUTO: 9.97 K/UL — SIGNIFICANT CHANGE UP (ref 3.8–10.5)

## 2021-12-19 RX ORDER — HEPARIN SODIUM 5000 [USP'U]/ML
5000 INJECTION INTRAVENOUS; SUBCUTANEOUS ONCE
Refills: 0 | Status: COMPLETED | OUTPATIENT
Start: 2021-12-19 | End: 2021-12-19

## 2021-12-19 RX ORDER — MOMETASONE FUROATE 220 UG/1
2 INHALANT RESPIRATORY (INHALATION) AT BEDTIME
Refills: 0 | Status: DISCONTINUED | OUTPATIENT
Start: 2021-12-19 | End: 2021-12-20

## 2021-12-19 RX ORDER — SODIUM CHLORIDE 9 MG/ML
1000 INJECTION INTRAMUSCULAR; INTRAVENOUS; SUBCUTANEOUS
Refills: 0 | Status: DISCONTINUED | OUTPATIENT
Start: 2021-12-19 | End: 2021-12-20

## 2021-12-19 RX ORDER — MOMETASONE FUROATE 220 UG/1
2 INHALANT RESPIRATORY (INHALATION) ONCE
Refills: 0 | Status: COMPLETED | OUTPATIENT
Start: 2021-12-19 | End: 2021-12-19

## 2021-12-19 RX ADMIN — HEPARIN SODIUM 5000 UNIT(S): 5000 INJECTION INTRAVENOUS; SUBCUTANEOUS at 05:18

## 2021-12-19 RX ADMIN — Medication 400 MILLIGRAM(S): at 18:00

## 2021-12-19 RX ADMIN — Medication 400 MILLIGRAM(S): at 17:21

## 2021-12-19 RX ADMIN — VALSARTAN 160 MILLIGRAM(S): 80 TABLET ORAL at 05:19

## 2021-12-19 RX ADMIN — PIPERACILLIN AND TAZOBACTAM 25 GRAM(S): 4; .5 INJECTION, POWDER, LYOPHILIZED, FOR SOLUTION INTRAVENOUS at 05:18

## 2021-12-19 RX ADMIN — HEPARIN SODIUM 5000 UNIT(S): 5000 INJECTION INTRAVENOUS; SUBCUTANEOUS at 13:13

## 2021-12-19 RX ADMIN — Medication 1000 MILLIGRAM(S): at 11:56

## 2021-12-19 RX ADMIN — MOMETASONE FUROATE 2 PUFF(S): 220 INHALANT RESPIRATORY (INHALATION) at 02:33

## 2021-12-19 RX ADMIN — MOMETASONE FUROATE 2 PUFF(S): 220 INHALANT RESPIRATORY (INHALATION) at 22:49

## 2021-12-19 RX ADMIN — Medication 250 MICROGRAM(S): at 05:19

## 2021-12-19 RX ADMIN — PIPERACILLIN AND TAZOBACTAM 25 GRAM(S): 4; .5 INJECTION, POWDER, LYOPHILIZED, FOR SOLUTION INTRAVENOUS at 22:49

## 2021-12-19 RX ADMIN — Medication 1000 MILLIGRAM(S): at 12:30

## 2021-12-19 RX ADMIN — PIPERACILLIN AND TAZOBACTAM 25 GRAM(S): 4; .5 INJECTION, POWDER, LYOPHILIZED, FOR SOLUTION INTRAVENOUS at 13:13

## 2021-12-19 NOTE — PROVIDER CONTACT NOTE (OTHER) - ASSESSMENT
Pt refused Triamterene and Hydrochlorothiazide at 06:00. Pt educated on the medication. Pt Took Valsartan 160 mg at 06:00.

## 2021-12-19 NOTE — PROGRESS NOTE ADULT - SUBJECTIVE AND OBJECTIVE BOX
SURGERY  Pager: #[phone]    INTERVAL EVENTS/SUBJECTIVE: No acute events overnight.     ______________________________________________  OBJECTIVE:   T(C): 36.9 (12-18-21 @ 22:45), Max: 36.9 (12-18-21 @ 22:45)  HR: 102 (12-18-21 @ 22:45) (88 - 102)  BP: 132/86 (12-18-21 @ 22:45) (122/77 - 132/86)  RR: 18 (12-18-21 @ 22:45) (12 - 18)  SpO2: 98% (12-18-21 @ 22:45) (98% - 100%)  Wt(kg): --  CAPILLARY BLOOD GLUCOSE        I&O's Detail      Physical exam:  Gen: resting in bed comfortably in NAD  HEENT: right neck swelling   Chest: no increased WOB, regular inspiratory effort   Abdomen: Soft, nontender, nondistended.  Vascular: WWP, OCAMPO x4  NEURO: awake, alert  ______________________________________________  LABS:  CBC Full  -  ( 18 Dec 2021 12:26 )  WBC Count : 11.39 K/uL  RBC Count : 4.73 M/uL  Hemoglobin : 12.3 g/dL  Hematocrit : 40.9 %  Platelet Count - Automated : 277 K/uL  Mean Cell Volume : 86.5 fL  Mean Cell Hemoglobin : 26.0 pg  Mean Cell Hemoglobin Concentration : 30.1 gm/dL  Auto Neutrophil # : 8.96 K/uL  Auto Lymphocyte # : 1.53 K/uL  Auto Monocyte # : 0.71 K/uL  Auto Eosinophil # : 0.08 K/uL  Auto Basophil # : 0.04 K/uL  Auto Neutrophil % : 78.7 %  Auto Lymphocyte % : 13.4 %  Auto Monocyte % : 6.2 %  Auto Eosinophil % : 0.7 %  Auto Basophil % : 0.4 %    12-18    141  |  99  |  13  ----------------------------<  111<H>  4.2   |  32<H>  |  0.50    Ca    9.9      18 Dec 2021 12:26    TPro  8.0  /  Alb  3.9  /  TBili  0.2  /  DBili  x   /  AST  19  /  ALT  27  /  AlkPhos  80  12-18    _____________________________________________  RADIOLOGY:     SURGERY  Pager: [phone]    INTERVAL EVENTS/SUBJECTIVE: No acute events overnight.     ______________________________________________  OBJECTIVE:   T(C): 36.9 (12-18-21 @ 22:45), Max: 36.9 (12-18-21 @ 22:45)  HR: 102 (12-18-21 @ 22:45) (88 - 102)  BP: 132/86 (12-18-21 @ 22:45) (122/77 - 132/86)  RR: 18 (12-18-21 @ 22:45) (12 - 18)  SpO2: 98% (12-18-21 @ 22:45) (98% - 100%)  Wt(kg): --  CAPILLARY BLOOD GLUCOSE    I&O's Detail      Physical exam:  Gen: resting in bed comfortably in NAD  HEENT: right neck swelling   Chest: no increased WOB, regular inspiratory effort   Abdomen: Soft, nontender, nondistended.  Vascular: WWP, OCAMPO x4  NEURO: awake, alert  ______________________________________________  LABS:  CBC Full  -  ( 18 Dec 2021 12:26 )  WBC Count : 11.39 K/uL  RBC Count : 4.73 M/uL  Hemoglobin : 12.3 g/dL  Hematocrit : 40.9 %  Platelet Count - Automated : 277 K/uL  Mean Cell Volume : 86.5 fL  Mean Cell Hemoglobin : 26.0 pg  Mean Cell Hemoglobin Concentration : 30.1 gm/dL  Auto Neutrophil # : 8.96 K/uL  Auto Lymphocyte # : 1.53 K/uL  Auto Monocyte # : 0.71 K/uL  Auto Eosinophil # : 0.08 K/uL  Auto Basophil # : 0.04 K/uL  Auto Neutrophil % : 78.7 %  Auto Lymphocyte % : 13.4 %  Auto Monocyte % : 6.2 %  Auto Eosinophil % : 0.7 %  Auto Basophil % : 0.4 %    12-18    141  |  99  |  13  ----------------------------<  111<H>  4.2   |  32<H>  |  0.50    Ca    9.9      18 Dec 2021 12:26    TPro  8.0  /  Alb  3.9  /  TBili  0.2  /  DBili  x   /  AST  19  /  ALT  27  /  AlkPhos  80  12-18    _____________________________________________  RADIOLOGY:

## 2021-12-19 NOTE — PATIENT PROFILE ADULT - FALL HARM RISK - UNIVERSAL INTERVENTIONS
Bed in lowest position, wheels locked, appropriate side rails in place/Call bell, personal items and telephone in reach/Instruct patient to call for assistance before getting out of bed or chair/Non-slip footwear when patient is out of bed/[place] to call system/Physically safe environment - no spills, clutter or unnecessary equipment/Purposeful Proactive Rounding/Room/bathroom lighting operational, light cord in reach

## 2021-12-19 NOTE — PROGRESS NOTE ADULT - ASSESSMENT
46F w/ hx of HTN, papillary thyroid carcinoma s/p total thyroidectomy in Aug 2020 and right modified radical neck dissection on 11/17/21 presenting with right neck swelling and pain and CT w/ 5.7cm collection    Plan:  - Abx for possible abscess  - IR consult for aspiration and possible drain placement  - Home BP and thyroid medications    C team surgery  Pager [phone] 46F w/ hx of HTN, papillary thyroid carcinoma s/p total thyroidectomy in Aug 2020 and right modified radical neck dissection on 11/17/21 presenting with right neck swelling and pain and CT w/ 5.7cm collection    Plan:  - Abx for possible abscess  - IR consult for aspiration - possible drainage on Monday  - Covid negative; 2AM labs to be drawn 12/20; NPO after midnight  - Home BP and thyroid medications    C team surgery  Pager [phone]

## 2021-12-19 NOTE — PROGRESS NOTE ADULT - SUBJECTIVE AND OBJECTIVE BOX
5 weeks s/p right neck dissection for metastatic thyroid carcinoma. admitted yesterday with increased pain, swelling, having started oral antibiotics 2 days prior. notes decreased pain and swelling today. afebrile. right upper neck swelling on exam.  no respiratory difficulty. to continue IV antibiotics and warm compresses. awaiting IR drainage of collection.

## 2021-12-20 ENCOUNTER — RESULT REVIEW (OUTPATIENT)
Age: 46
End: 2021-12-20

## 2021-12-20 ENCOUNTER — NON-APPOINTMENT (OUTPATIENT)
Age: 46
End: 2021-12-20

## 2021-12-20 ENCOUNTER — APPOINTMENT (OUTPATIENT)
Dept: ENDOCRINOLOGY | Facility: CLINIC | Age: 46
End: 2021-12-20

## 2021-12-20 ENCOUNTER — TRANSCRIPTION ENCOUNTER (OUTPATIENT)
Age: 46
End: 2021-12-20

## 2021-12-20 VITALS
TEMPERATURE: 98 F | OXYGEN SATURATION: 96 % | RESPIRATION RATE: 18 BRPM | SYSTOLIC BLOOD PRESSURE: 138 MMHG | HEART RATE: 74 BPM | DIASTOLIC BLOOD PRESSURE: 79 MMHG

## 2021-12-20 LAB
ANION GAP SERPL CALC-SCNC: 28 MMOL/L — HIGH (ref 7–14)
APTT BLD: 28 SEC — SIGNIFICANT CHANGE UP (ref 27–36.3)
BLD GP AB SCN SERPL QL: NEGATIVE — SIGNIFICANT CHANGE UP
BUN SERPL-MCNC: 22 MG/DL — SIGNIFICANT CHANGE UP (ref 7–23)
CALCIUM SERPL-MCNC: 9.3 MG/DL — SIGNIFICANT CHANGE UP (ref 8.4–10.5)
CHLORIDE SERPL-SCNC: 94 MMOL/L — LOW (ref 98–107)
CO2 SERPL-SCNC: 27 MMOL/L — SIGNIFICANT CHANGE UP (ref 22–31)
CREAT SERPL-MCNC: 0.57 MG/DL — SIGNIFICANT CHANGE UP (ref 0.5–1.3)
GLUCOSE SERPL-MCNC: 91 MG/DL — SIGNIFICANT CHANGE UP (ref 70–99)
GRAM STN FLD: SIGNIFICANT CHANGE UP
HCT VFR BLD CALC: 39.9 % — SIGNIFICANT CHANGE UP (ref 34.5–45)
HGB BLD-MCNC: 12.1 G/DL — SIGNIFICANT CHANGE UP (ref 11.5–15.5)
MAGNESIUM SERPL-MCNC: 2.2 MG/DL — SIGNIFICANT CHANGE UP (ref 1.6–2.6)
MCHC RBC-ENTMCNC: 25.8 PG — LOW (ref 27–34)
MCHC RBC-ENTMCNC: 30.3 GM/DL — LOW (ref 32–36)
MCV RBC AUTO: 85.1 FL — SIGNIFICANT CHANGE UP (ref 80–100)
NRBC # BLD: 0 /100 WBCS — SIGNIFICANT CHANGE UP
NRBC # FLD: 0 K/UL — SIGNIFICANT CHANGE UP
PHOSPHATE SERPL-MCNC: 5.1 MG/DL — HIGH (ref 2.5–4.5)
PLATELET # BLD AUTO: 308 K/UL — SIGNIFICANT CHANGE UP (ref 150–400)
POTASSIUM SERPL-MCNC: 4.4 MMOL/L — SIGNIFICANT CHANGE UP (ref 3.5–5.3)
POTASSIUM SERPL-SCNC: 4.4 MMOL/L — SIGNIFICANT CHANGE UP (ref 3.5–5.3)
RBC # BLD: 4.69 M/UL — SIGNIFICANT CHANGE UP (ref 3.8–5.2)
RBC # FLD: 14.2 % — SIGNIFICANT CHANGE UP (ref 10.3–14.5)
RH IG SCN BLD-IMP: POSITIVE — SIGNIFICANT CHANGE UP
SODIUM SERPL-SCNC: 149 MMOL/L — HIGH (ref 135–145)
SPECIMEN SOURCE: SIGNIFICANT CHANGE UP
WBC # BLD: 8.73 K/UL — SIGNIFICANT CHANGE UP (ref 3.8–10.5)
WBC # FLD AUTO: 8.73 K/UL — SIGNIFICANT CHANGE UP (ref 3.8–10.5)

## 2021-12-20 PROCEDURE — 88173 CYTOPATH EVAL FNA REPORT: CPT | Mod: 26

## 2021-12-20 PROCEDURE — 88305 TISSUE EXAM BY PATHOLOGIST: CPT | Mod: 26

## 2021-12-20 PROCEDURE — 10030 IMG GID FLU COLL DRG SFT TIS: CPT

## 2021-12-20 RX ORDER — IBUPROFEN 200 MG
1 TABLET ORAL
Qty: 0 | Refills: 0 | DISCHARGE
Start: 2021-12-20

## 2021-12-20 RX ORDER — ACETAMINOPHEN 500 MG
2 TABLET ORAL
Qty: 0 | Refills: 0 | DISCHARGE
Start: 2021-12-20

## 2021-12-20 RX ADMIN — Medication 1000 MILLIGRAM(S): at 04:27

## 2021-12-20 RX ADMIN — PIPERACILLIN AND TAZOBACTAM 25 GRAM(S): 4; .5 INJECTION, POWDER, LYOPHILIZED, FOR SOLUTION INTRAVENOUS at 14:57

## 2021-12-20 RX ADMIN — VALSARTAN 160 MILLIGRAM(S): 80 TABLET ORAL at 06:25

## 2021-12-20 RX ADMIN — Medication 1000 MILLIGRAM(S): at 11:32

## 2021-12-20 RX ADMIN — Medication 1000 MILLIGRAM(S): at 06:20

## 2021-12-20 RX ADMIN — PIPERACILLIN AND TAZOBACTAM 25 GRAM(S): 4; .5 INJECTION, POWDER, LYOPHILIZED, FOR SOLUTION INTRAVENOUS at 06:24

## 2021-12-20 RX ADMIN — Medication 250 MICROGRAM(S): at 06:25

## 2021-12-20 RX ADMIN — Medication 1000 MILLIGRAM(S): at 12:15

## 2021-12-20 NOTE — DISCHARGE NOTE NURSING/CASE MANAGEMENT/SOCIAL WORK - NSDCPEFALRISK_GEN_ALL_CORE
For information on Fall & Injury Prevention, visit: [email or web address] OR  [email or web address] OR  https://www.cdc.gov/steadi/patient.html

## 2021-12-20 NOTE — DISCHARGE NOTE PROVIDER - CARE PROVIDER_API CALL
Matt Swanson)  Plastic Surgery  [address] [place] [address] [address]  [place] [address]  Phone: [phone]  Fax: [phone]  Follow Up Time: 1-3 days

## 2021-12-20 NOTE — PROGRESS NOTE ADULT - SUBJECTIVE AND OBJECTIVE BOX
SURGERY DAILY PROGRESS NOTE:       SUBJECTIVE/ROS: Patient feels well. No acute overnight events.    OBJECTIVE:    Vital Signs Last 24 Hrs  T(C): 36.6 (20 Dec 2021 00:51), Max: 37.2 (19 Dec 2021 21:21)  T(F): 97.8 (20 Dec 2021 00:51), Max: 98.9 (19 Dec 2021 21:21)  HR: 79 (20 Dec 2021 00:51) (70 - 86)  BP: 130/86 (20 Dec 2021 00:51) (125/91 - 145/93)  BP(mean): --  RR: 17 (20 Dec 2021 00:51) (17 - 18)  SpO2: 98% (20 Dec 2021 00:51) (97% - 99%)    I&O's Detail    18 Dec 2021 07:01  -  19 Dec 2021 07:00  --------------------------------------------------------  IN:    IV PiggyBack: 100 mL    Oral Fluid: 360 mL  Total IN: 460 mL    OUT:    Voided (mL): 500 mL  Total OUT: 500 mL    Total NET: -40 mL      19 Dec 2021 07:01  -  20 Dec 2021 03:08  --------------------------------------------------------  IN:    IV PiggyBack: 100 mL  Total IN: 100 mL    OUT:    Voided (mL): 1300 mL  Total OUT: 1300 mL    Total NET: -1200 mL    Physical exam:  Gen: resting in bed comfortably in NAD  HEENT: right neck swelling, slight TTP, no fluctuance, slight erythema  Chest: no increased WOB, regular inspiratory effort   Abdomen: Soft, nontender, nondistended.  Vascular: WWP, OCAMPO x4  NEURO: awake, alert    LABS:                        11.6   9.97  )-----------( 280      ( 19 Dec 2021 07:34 )             36.8     12-19    141  |  100  |  16  ----------------------------<  115<H>  3.9   |  30  |  0.50    Ca    9.5      19 Dec 2021 07:34  Phos  4.2     12-19  Mg     2.20     12-19    TPro  8.0  /  Alb  3.9  /  TBili  0.2  /  DBili  x   /  AST  19  /  ALT  27  /  AlkPhos  80  12-18    PT/INR - ( 19 Dec 2021 07:34 )   PT: 14.1 sec;   INR: 1.24 ratio         PTT - ( 19 Dec 2021 07:34 )  PTT:28.6 sec

## 2021-12-20 NOTE — DISCHARGE NOTE PROVIDER - NSDCCPCAREPLAN_GEN_ALL_CORE_FT
PRINCIPAL DISCHARGE DIAGNOSIS  Diagnosis: Abscess of neck  Assessment and Plan of Treatment: WOUND CARE:    BATHING: Please do not submerge wound underwater. You may shower and/or sponge bathe.  ACTIVITY: No heavy lifting or straining. Otherwise, you may return to your usual level of physical activity. If you are taking narcotic pain medication (such as Percocet) DO NOT drive a car, operate machinery or make important decisions.  DIET: Return to your usual diet.  NOTIFY YOUR SURGEON IF: You have any bleeding that does not stop, any pus draining from your wound(s), any fever (over 100.4 F) or chills, persistent nausea/vomiting, persistent diarrhea, or if your pain is not controlled on your discharge pain medications.  FOLLOW-UP: Please follow up with your primary care physician in one week regarding your hospitalization       PRINCIPAL DISCHARGE DIAGNOSIS  Diagnosis: Abscess of neck  Assessment and Plan of Treatment: WOUND CARE:  Drain care as taught prior to discharge. Flush drain 1 x a day. Record output.  BATHING: Please do not submerge wound underwater. You may shower and/or sponge bathe.  ACTIVITY: No heavy lifting or straining. Otherwise, you may return to your usual level of physical activity. If you are taking narcotic pain medication (such as Percocet) DO NOT drive a car, operate machinery or make important decisions.  DIET: Return to your usual diet.  NOTIFY YOUR SURGEON IF: You have any bleeding that does not stop, any pus draining from your wound(s), any fever (over 100.4 F) or chills, persistent nausea/vomiting, persistent diarrhea, or if your pain is not controlled on your discharge pain medications.  FOLLOW-UP: Please follow up with your primary care physician in one week regarding your hospitalization

## 2021-12-20 NOTE — DISCHARGE NOTE NURSING/CASE MANAGEMENT/SOCIAL WORK - NSDCPNINST_GEN_ALL_CORE
Call MD for follow up appointment. Call for fever, severe pain, increased drainage. Empty NOAM drain at least three times a day and record output.

## 2021-12-20 NOTE — DISCHARGE NOTE PROVIDER - HOSPITAL COURSE
Patient : Ashlee Baker Age: 33 year old Sex: female   MRN: [id number] Encounter Date: 11/23/2019    G15/G15   History     Chief Complaint   Patient presents with   • Abdominal Pain     HPI     11/23/2019  12:36 PM Ashlee Baker is a 33 year old female who presents to the ED with her mother for the evaluation of upper abdominal pain, nausea, and vomiting that began this morning. The patient states she was feeling well yesterday, but this morning at 10:15 AM she drove to her mother's house and immediately went to the bathroom to vomit. She then developed bilateral leg weakness and dizziness. The patient reports a history of similar symptoms once, years ago, with an unknown diagnosis. She also reports some shortness of breath this morning, but denies any fevers, cough, chest pain, problems with bowel movements, urinary symptoms, rash, or swelling. The patient has a history of anxiety, depression, and asthma. She admits to smoking cigarettes, but denies any alcohol use or illicit drug use, including marijuana. There are no further complaints or modifying factors at this time.    PCP: Other Other     Allergies   Allergen Reactions   • Latex   (Environmental) RASH       Current Discharge Medication List      Prior to Admission Medications    Details   ibuprofen (MOTRIN) 600 MG tablet Take 1 tablet by mouth every 6 hours as needed for Pain.  Qty: 30 tablet, Refills: 0      lidocaine (LIDODERM) 5 % patch Place 1 patch onto the skin every 24 hours. Remove patch 12 hours after applying  Qty: 30 patch, Refills: 0      ondansetron (ZOFRAN ODT) 4 MG disintegrating tablet Place 1 tablet onto the tongue every 6 hours.  Qty: 10 tablet, Refills: 0      ondansetron (ZOFRAN ODT) 4 MG disintegrating tablet Take 1 tablet by mouth every 6 hours.  Qty: 20 tablet, Refills: 0      ibuprofen (MOTRIN) 800 MG tablet Take 1 tablet by mouth 3 times daily.  Qty: 20 tablet, Refills: 0      albuterol (VENTOLIN) (2.5 MG/3ML) 0.083% nebulizer  solution Take 2.5 mg by nebulization every 6 hours as needed for Wheezing.      penicillin v potassium (VEETID) 500 MG tablet Take 1 tablet by mouth 4 times daily.  Qty: 40 tablet, Refills: 0      cyclobenzaprine (FLEXERIL) 10 MG tablet Take 1 tablet by mouth 3 times daily as needed for Muscle spasms.  Qty: 15 tablet, Refills: 0      ibuprofen (MOTRIN) 800 MG tablet Take 1 tablet by mouth 3 times daily as needed for Pain.  Qty: 20 tablet, Refills: 0             Past Medical History:   Diagnosis Date   • Head ache    • RAD (reactive airway disease)        Past Surgical History:   Procedure Laterality Date   • TUBAL LIGATION       History reviewed. No pertinent family history.      Social History     Tobacco Use   • Smoking status: Current Every Day Smoker     Types: Cigars   • Smokeless tobacco: Never Used   Substance Use Topics   • Alcohol use: No   • Drug use: No       Review of Systems   Constitutional: Negative for chills and fever.   HENT: Negative for congestion.    Respiratory: Positive for shortness of breath. Negative for cough.    Cardiovascular: Negative for chest pain.   Gastrointestinal: Positive for abdominal pain (upper), nausea and vomiting. Negative for diarrhea.   Genitourinary: Negative for difficulty urinating and dysuria.   Musculoskeletal: Negative for back pain.   Skin: Negative for rash.   Neurological: Positive for dizziness and weakness (bilateral legs).   Psychiatric/Behavioral: Negative for behavioral problems.       Physical Exam     ED Triage Vitals [11/23/19 1210]   ED Triage Vitals Group      Temp 98.6 °F (37 °C)      Pulse 101      Resp 18      /85      SpO2 98 %      EtCO2 mmHg       Height       Weight       Weight Scale Used       BMI (Calculated)       IBW/kg (Calculated)        Physical Exam   Constitutional: She is oriented to person, place, and time. She appears well-developed.   The patient walked to the room from intake.    HENT:   Head: Normocephalic and atraumatic.    Eyes: Pupils are equal, round, and reactive to light.   Neck: Normal range of motion.   Cardiovascular: Normal rate.   Pulmonary/Chest: Effort normal and breath sounds normal.   Breath sounds are clear.    Abdominal: Soft. Bowel sounds are normal. She exhibits no distension. There is tenderness in the epigastric area. Musculoskeletal: Normal range of motion.         General: Edema (trace pretibial edema) present. No tenderness.     Neurological: She is alert and oriented to person, place, and time.   Skin: Skin is warm.   Psychiatric: Her mood appears anxious (moderate).   Nursing note and vitals reviewed.      ED Course     Procedures    Lab Results     Results for orders placed or performed during the hospital encounter of 11/23/19   CBC with Automated Differential   Result Value Ref Range    WBC 8.7 4.2 - 11.0 K/mcL    RBC 4.71 4.00 - 5.20 mil/mcL    HGB 14.2 12.0 - 15.5 g/dL    HCT 45.1 36.0 - 46.5 %    MCV 95.8 78.0 - 100.0 fl    MCH 30.1 26.0 - 34.0 pg    MCHC 31.5 (L) 32.0 - 36.5 g/dL    RDW-CV 14.6 11.0 - 15.0 %     140 - 450 K/mcL    NRBC 0 0 /100 WBC    DIFF TYPE AUTOMATED DIFFERENTIAL     Neutrophil 67 %    LYMPH 25 %    MONO 6 %    EOSIN 0 %    BASO 1 %    Percent Immature Granuloctyes 1 %    Absolute Neutrophil 5.9 1.8 - 7.7 K/mcL    Absolute Lymph 2.2 1.0 - 4.8 K/mcL    Absolute Mono 0.5 0.3 - 0.9 K/mcL    Absolute Eos 0.0 (L) 0.1 - 0.5 K/mcL    Absolute Baso 0.0 0.0 - 0.3 K/mcL    Absolute Immature Granulocytes 0.0 0 - 0.2 K/mcl   Hepatic Function Panel   Result Value Ref Range    Albumin 4.3 3.6 - 5.1 g/dL    TOTAL BILIRUBIN 0.2 0.2 - 1.0 mg/dL    DIRECT BILIRUBIN <0.1 0.0 - 0.2 mg/dL    ALK PHOSPHATASE 73 45 - 117 Units/L    ALT/SGPT 14 <64 Units/L    AST/SGOT 17 <38 Units/L    TOTAL PROTEIN 8.2 6.4 - 8.2 g/dL   Lipase   Result Value Ref Range    Lipase 55 (L) 73 - 393 Units/L   URINALYSIS & REFLEX MICRO WITH CULTURE IF INDICATED   Result Value Ref Range    COLOR YELLOW YELLOW    APPEARANCE  CLEAR     GLUCOSE(URINE) NEGATIVE NEGATIVE mg/dL    BILIRUBIN NEGATIVE NEGATIVE    KETONES TRACE (A) NEGATIVE mg/dL    SPECIFIC GRAVITY 1.025 1.005 - 1.030    BLOOD LARGE (A) NEGATIVE    pH 6.0 5.0 - 7.0 Units    PROTEIN(URINE) TRACE (A) NEGATIVE mg/dL    UROBILINOGEN 0.2 0.0 - 1.0 mg/dL    NITRITE NEGATIVE NEGATIVE    LEUKOCYTE ESTERASE NEGATIVE NEGATIVE    SPECIMEN TYPE URINE CLEAN CATCH     Squamous EPI'S 1 to 5 0 - 5 /hpf    RBC 3 to 5 0 - 2 /hpf    WBC 1 to 5 0 - 5 /hpf    BACTERIA NONE SEEN NONE SEEN /hpf    Hyaline Casts 1 to 5 0 - 5 /lpf   Chem 8 Panel - Point of Care   Result Value Ref Range    Sodium  (H) 135 - 145 mmol/L    Potassium POC 4.0 3.4 - 5.1 mmol/L    Chloride  98 - 107 mmol/L    CALCIUM IONIZED-POC 1.05 (L) 1.15 - 1.29 mmol/L    CO2 Total 29 (H) 19 - 24 mmol/L    GLUCOSE POC 85 70 - 99 mg/dL    BUN POC 9 6 - 20 mg/dL    HEMATOCRIT POC 46.0 36.0 - 46.5 %    Hemoglobin POC 15.6 (H) 12.0 - 15.5 g/dL    ANION GAP POC 16 mmol/L    Creatinine POC 1.00 (H) 0.51 - 0.95 mg/dL    Estimated GFR  (POC) 85     Estimated GFR Non- (POC) 73    Save for Possible XMatch   Result Value Ref Range    CROSSMATCH  2019    Urine pregnancy POC   Result Value Ref Range    URINE PREGNANCY,QUAL Negative Negative       ED Medication Orders (From admission, onward)    Ordered Start     Status Ordering Provider    19 1247 19 1248  haloperidol (HALDOL) 5 MG/ML injection 2.5 mg  ONCE      Last MAR action:  Given JAIME DUBOSE    19 1215 19 1216  ondansetron (ZOFRAN ODT) disintegrating tablet 4 mg  ONCE      Last MAR action:  Given NATALY VILLAREAL    19 1215 19 1214    PRN      Discontinued NATALY VILLAREAL    19 1215 19 1214    PRN      Discontinued NATALY VILLAREAL               [facility]    Vitals  Vitals:    19 1210 19 1245 19 1300 19 1500   BP: 114/85 144/88 124/79 98/59   Pulse: 101 112 90 77    Resp: 18 16 18 16   Temp: 98.6 °F (37 °C)      TempSrc: Oral      SpO2: 98% 99% 98% 100%       ED Course  12:47 PM I performed the initial evaluation of the patient. The patient presents to the ED with abdominal pain, nausea, and vomiting. I updated the patient on the plan for evaluation with blood work and UA. I will treat the patient with GI cocktail, Zofran, and Haldol. The patient is agreeable with the plan for evaluation.    2:09 PM I reassessed the patient, who is resting comfortably. She states she is feeling better at this time. I updated the patient on her stable ED workup and she was prompted on a urine sample at this time.      2:59 PM I reassessed the patient. The patient continues to feel improved. I updated the patient on the results of her UA and we discussed the plan for further care at home. She was advised to follow up with her PCP and was given ED return precautions, including new or worsening symptoms. All questions and concerns were addressed. The patient understands and agrees with the plan of care.     MDM  Pt presented to the ED with upper abdominal pain, nausea and vomiting.  When I entered the room, patient was kneeling on the floor next to her bed and refusing to get into bed.  I encouraged the patient to stand and get into bed which she could do under her own power.  Pt was very anxious and reported not taking her psych medications.  PEx, mild upper abdominal pain and workup unremarkable.  Pt's symptoms resolved and with haldol and repeat abdominal exam unremarkable.  Given negative workup, resolution of symptoms and benign exam, no further workup indicated and patient was discharged home in good condition.    Imaging Records for the Patient were reviewed.   Patient's Records indicate that the patient did not have a CT Scan of the Abdomen in the last 6 months.      Critical Care time spent on this patient outside of billable procedures:  None    Clinical Impression  ED Diagnoses         Final diagnoses    Abdominal pain, unspecified abdominal location          Nausea and vomiting, intractability of vomiting not specified, unspecified vomiting type                The patient was provided with a recommendation to follow up with a primary care provider and obtain reassessment of his/her blood pressure within three months.    Follow Up:  Jacek Chiu MD  [address]  [address]  [facility] [address]  [phone]    In 2 days  Please return if symptoms worsen      Pt is discharged to home/self care in stable condition.      I have reviewed the information recorded by the scribe for accuracy and agree with its contents.    ____________________________________________________________________    Yeny Villela acting as a scribe for Dr. Tono Gill  Dictation # [id number]  Scribe: Yeny Gill MD  11/23/19 1446     46F w/ hx of HTN, papillary thyroid carcinoma s/p total thyroidectomy in Aug 2020 and recent right modified radical neck dissection for biopsy proven metastatic node on 11/17/21 presenting with right neck swelling and pain. The pain began 1 week ago and has worsened in the past day. Also reports chills. Denies dysphagia or SOB. She was prescribed Augmentin two days ago.  In the ED she is afebrile, hemodynamically normal, WBC 11, and CT shows 5.7 x 3.6 cm peripherally enhancing right neck collection.  Patient admitted to [facility] Surgery service and transferred to surgical floor. Started on IV antibiotics, IRconsulted for drainage of collection.  12/20 patient underwent aspiration and drainage of neck collection with int. radiology. Patient tolerated procedure well without complication.

## 2021-12-20 NOTE — PROGRESS NOTE ADULT - SUBJECTIVE AND OBJECTIVE BOX
5 weeks s/p right neck dissection. right neck collection drained by IR earlier today. drain in place. nursing has educated her on management of drain.  feels well now that collection has been drained. discharge home on augmentin. f/u in office

## 2021-12-20 NOTE — CHART NOTE - NSCHARTNOTEFT_GEN_A_CORE
Pre-Interventional Radiology Procedure Note    46y    Female    Procedure: neck collection drainage/aspiration    Diagnosis/Indication: Patient is a 46y old  Female who presents with a chief complaint of right neck swelling (19 Dec 2021 10:22)      Interventional Radiology Attending Physician: Venecia Ron MD    Ordering Attending Physician: Matt Swanson MD    PAST MEDICAL & SURGICAL HISTORY:  Hypertension    Asthma  controlled meds denies any asthma attack    Uterine leiomyoma, unspecified location  s/p hysterectomy    Osteoarthritis (arthritis due to wear and tear of joints)  knees    Thyroid cancer  diagnosed in 2020    Neck mass  2021 malignant    Arthritis  right knee    Morbid obesity    Normal vaginal delivery    History of bilateral tubal ligation  17 years ago    S/P hysterectomy  2018    H/O thyroidectomy  September 2020 malignant         CBC Full  -  ( 20 Dec 2021 04:24 )  WBC Count : 8.73 K/uL  RBC Count : 4.69 M/uL  Hemoglobin : 12.1 g/dL  Hematocrit : 39.9 %  Platelet Count - Automated : 308 K/uL  Mean Cell Volume : 85.1 fL  Mean Cell Hemoglobin : 25.8 pg  Mean Cell Hemoglobin Concentration : 30.3 gm/dL  Auto Neutrophil # : x  Auto Lymphocyte # : x  Auto Monocyte # : x  Auto Eosinophil # : x  Auto Basophil # : x  Auto Neutrophil % : x  Auto Lymphocyte % : x  Auto Monocyte % : x  Auto Eosinophil % : x  Auto Basophil % : x    12-20    149<H>  |  94<L>  |  22  ----------------------------<  91  4.4   |  27  |  0.57    Ca    9.3      20 Dec 2021 04:24  Phos  5.1     12-20  Mg     2.20     12-20    TPro  8.0  /  Alb  3.9  /  TBili  0.2  /  DBili  x   /  AST  19  /  ALT  27  /  AlkPhos  80  12-18    PT/INR - ( 19 Dec 2021 07:34 )   PT: 14.1 sec;   INR: 1.24 ratio         PTT - ( 20 Dec 2021 05:47 )  PTT:28.0 sec

## 2021-12-20 NOTE — DISCHARGE NOTE PROVIDER - NSDCFUSCHEDAPPT_GEN_ALL_CORE_FT
ENEDINA GOETZ ; 12/20/2021 ; NPP Med Endocr [address] [facility]  ENEDINA GOETZ ; 12/23/2021 ; NPP Rad  [facility]  ENEDINA GOETZ ; 12/28/2021 ; NPP [place] [address] [facility]  ENEDINA GOETZ ; 03/09/2022 ; NPP CARDIOLOGY [address] [facility] ENEDINA GOETZ ; 12/28/2021 ; NPP [place] [address] [facility]  ENEDINA GOETZ ; 03/09/2022 ; NPP CARDIOLOGY [address] [facility]

## 2021-12-20 NOTE — DISCHARGE NOTE PROVIDER - NSDCMRMEDTOKEN_GEN_ALL_CORE_FT
fluticasone 50 mcg/inh nasal spray: 1 spray(s) nasal once a day, As Needed  levothyroxine 200 mcg (0.2 mg) oral tablet: 1 tab(s) orally once a day AM  levothyroxine 50 mcg (0.05 mg) oral tablet: 1 tab(s) orally once a day AM  Qvar 80 mcg/inh inhalation aerosol: 1 puff(s) inhaled once a day (at bedtime)  triamterene-hydrochlorothiazide 37.5 mg-25 mg oral tablet: 1 tab(s) orally once a day AM  valsartan 160 mg oral tablet: 1 tab(s) orally once a day AM  Vitamin C: 1 tab(s) orally once a day  Vitamin D3 50 mcg (2000 intl units) oral capsule: 1 cap(s) orally once a day   acetaminophen 500 mg oral tablet: 2 tab(s) orally every 6 hours, As needed, Mild Pain (1 - 3)  amoxicillin-clavulanate 875 mg-125 mg oral tablet: 1 tab(s) orally 2 times a day   fluticasone 50 mcg/inh nasal spray: 1 spray(s) nasal once a day, As Needed  ibuprofen 400 mg oral tablet: 1 tab(s) orally every 6 hours, As needed, Mild Pain (1 - 3)  levothyroxine 200 mcg (0.2 mg) oral tablet: 1 tab(s) orally once a day AM  levothyroxine 50 mcg (0.05 mg) oral tablet: 1 tab(s) orally once a day AM  Qvar 80 mcg/inh inhalation aerosol: 1 puff(s) inhaled once a day (at bedtime)  triamterene-hydrochlorothiazide 37.5 mg-25 mg oral tablet: 1 tab(s) orally once a day AM  valsartan 160 mg oral tablet: 1 tab(s) orally once a day AM  Vitamin C: 1 tab(s) orally once a day  Vitamin D3 50 mcg (2000 intl units) oral capsule: 1 cap(s) orally once a day

## 2021-12-20 NOTE — PROGRESS NOTE ADULT - ASSESSMENT
46F w/ hx of HTN, papillary thyroid carcinoma s/p total thyroidectomy in Aug 2020 and right modified radical neck dissection on 11/17/21 presenting with right neck swelling and pain and CT w/ 5.7cm collection    Plan:  - Abx for possible abscess  - IR consult for aspiration - possible drainage on Monday  - Covid negative; 2AM labs to be drawn 12/20; NPO after midnight  - Home BP and thyroid medications    C team surgery  Pager [phone]

## 2021-12-20 NOTE — DISCHARGE NOTE NURSING/CASE MANAGEMENT/SOCIAL WORK - PATIENT PORTAL LINK FT
You can access the FollowMyHealth Patient Portal offered by [facility] by registering at the following website: [email or web address]. By joining [facility]’s FollowMyHealth portal, you will also be able to view your health information using other applications (apps) compatible with our system.

## 2021-12-21 LAB — NON-GYNECOLOGICAL CYTOLOGY STUDY: SIGNIFICANT CHANGE UP

## 2021-12-23 ENCOUNTER — APPOINTMENT (OUTPATIENT)
Dept: ULTRASOUND IMAGING | Facility: CLINIC | Age: 46
End: 2021-12-23

## 2021-12-25 LAB
CULTURE RESULTS: SIGNIFICANT CHANGE UP
SPECIMEN SOURCE: SIGNIFICANT CHANGE UP

## 2021-12-28 ENCOUNTER — APPOINTMENT (OUTPATIENT)
Dept: SURGERY | Facility: CLINIC | Age: 46
End: 2021-12-28
Payer: MEDICAID

## 2021-12-28 ENCOUNTER — LABORATORY RESULT (OUTPATIENT)
Age: 46
End: 2021-12-28

## 2021-12-28 PROCEDURE — 99024 POSTOP FOLLOW-UP VISIT: CPT

## 2021-12-28 PROCEDURE — 36415 COLL VENOUS BLD VENIPUNCTURE: CPT

## 2021-12-28 NOTE — HISTORY OF PRESENT ILLNESS
[de-identified] : Pt 5 weeks s/p right neck dissection and 1 week s/p drainage right neck collection feeling much better less than 5 cc drainage yesterday and today. Pt missed Dr Miles appointment because she was in hospital

## 2021-12-28 NOTE — ASSESSMENT
[FreeTextEntry1] : s/p Right neck dissection\par drain removed\par blood work\par daily care\par f/u 2 weeks\par f/u Dr Miles

## 2021-12-28 NOTE — PHYSICAL EXAM
[de-identified] : well healed scar no eryrthema no edema [Midline] : located in midline position [Normal] : orientation to person, place, and time: normal

## 2021-12-29 ENCOUNTER — NON-APPOINTMENT (OUTPATIENT)
Age: 46
End: 2021-12-29

## 2021-12-29 LAB
T3 SERPL-MCNC: 113 NG/DL
T4 FREE SERPL-MCNC: 2.4 NG/DL
THYROGLOB AB SERPL-ACNC: 1043 IU/ML
THYROGLOB SERPL-MCNC: <0.2 NG/ML
TSH SERPL-ACNC: 0.53 UIU/ML

## 2021-12-30 ENCOUNTER — NON-APPOINTMENT (OUTPATIENT)
Age: 46
End: 2021-12-30

## 2022-01-03 ENCOUNTER — NON-APPOINTMENT (OUTPATIENT)
Age: 47
End: 2022-01-03

## 2022-01-03 ENCOUNTER — RX RENEWAL (OUTPATIENT)
Age: 47
End: 2022-01-03

## 2022-01-07 ENCOUNTER — APPOINTMENT (OUTPATIENT)
Dept: ENDOCRINOLOGY | Facility: CLINIC | Age: 47
End: 2022-01-07

## 2022-01-20 ENCOUNTER — NON-APPOINTMENT (OUTPATIENT)
Age: 47
End: 2022-01-20

## 2022-01-25 ENCOUNTER — APPOINTMENT (OUTPATIENT)
Dept: SURGERY | Facility: CLINIC | Age: 47
End: 2022-01-25
Payer: MEDICAID

## 2022-01-25 PROCEDURE — 99024 POSTOP FOLLOW-UP VISIT: CPT

## 2022-01-25 NOTE — PHYSICAL EXAM
[de-identified] : well healed scar [Midline] : located in midline position [Normal] : orientation to person, place, and time: normal

## 2022-01-25 NOTE — HISTORY OF PRESENT ILLNESS
[de-identified] : Pt 2 months s/p Right neck dissection and IR drainage  doing well Pt waiting for appointment with Dr Miles

## 2022-02-03 ENCOUNTER — TRANSCRIPTION ENCOUNTER (OUTPATIENT)
Age: 47
End: 2022-02-03

## 2022-03-09 ENCOUNTER — APPOINTMENT (OUTPATIENT)
Dept: CARDIOLOGY | Facility: CLINIC | Age: 47
End: 2022-03-09

## 2022-03-31 ENCOUNTER — APPOINTMENT (OUTPATIENT)
Dept: OBGYN | Facility: CLINIC | Age: 47
End: 2022-03-31

## 2022-04-11 PROBLEM — Z11.59 SCREENING FOR VIRAL DISEASE: Status: ACTIVE | Noted: 2020-07-09

## 2022-04-25 ENCOUNTER — TRANSCRIPTION ENCOUNTER (OUTPATIENT)
Age: 47
End: 2022-04-25

## 2022-04-27 ENCOUNTER — TRANSCRIPTION ENCOUNTER (OUTPATIENT)
Age: 47
End: 2022-04-27

## 2022-05-03 ENCOUNTER — LABORATORY RESULT (OUTPATIENT)
Age: 47
End: 2022-05-03

## 2022-05-04 ENCOUNTER — OUTPATIENT (OUTPATIENT)
Dept: OUTPATIENT SERVICES | Facility: HOSPITAL | Age: 47
LOS: 1 days | End: 2022-05-04
Payer: MEDICAID

## 2022-05-04 ENCOUNTER — RESULT CHARGE (OUTPATIENT)
Age: 47
End: 2022-05-04

## 2022-05-04 ENCOUNTER — APPOINTMENT (OUTPATIENT)
Dept: OBGYN | Facility: CLINIC | Age: 47
End: 2022-05-04
Payer: MEDICAID

## 2022-05-04 VITALS — BODY MASS INDEX: 53.38 KG/M2 | SYSTOLIC BLOOD PRESSURE: 150 MMHG | WEIGHT: 293 LBS | DIASTOLIC BLOOD PRESSURE: 100 MMHG

## 2022-05-04 VITALS — SYSTOLIC BLOOD PRESSURE: 140 MMHG | DIASTOLIC BLOOD PRESSURE: 100 MMHG

## 2022-05-04 DIAGNOSIS — E89.0 POSTPROCEDURAL HYPOTHYROIDISM: Chronic | ICD-10-CM

## 2022-05-04 DIAGNOSIS — Z90.710 ACQUIRED ABSENCE OF BOTH CERVIX AND UTERUS: Chronic | ICD-10-CM

## 2022-05-04 DIAGNOSIS — Z98.51 TUBAL LIGATION STATUS: Chronic | ICD-10-CM

## 2022-05-04 DIAGNOSIS — N76.0 ACUTE VAGINITIS: ICD-10-CM

## 2022-05-04 PROCEDURE — 87591 N.GONORRHOEAE DNA AMP PROB: CPT

## 2022-05-04 PROCEDURE — 87491 CHLMYD TRACH DNA AMP PROBE: CPT

## 2022-05-04 PROCEDURE — G0463: CPT

## 2022-05-04 PROCEDURE — 99213 OFFICE O/P EST LOW 20 MIN: CPT | Mod: GC

## 2022-05-04 RX ORDER — ESTRADIOL 0.1 MG/G
0.1 CREAM VAGINAL
Qty: 1 | Refills: 0 | Status: ACTIVE | COMMUNITY
Start: 2022-05-04 | End: 1900-01-01

## 2022-05-05 LAB
BILIRUB UR QL STRIP: NORMAL
C TRACH RRNA SPEC QL NAA+PROBE: SIGNIFICANT CHANGE UP
GLUCOSE UR-MCNC: NORMAL
HCG UR QL: 0.2 EU/DL
HGB UR QL STRIP.AUTO: NORMAL
KETONES UR-MCNC: NORMAL
LEUKOCYTE ESTERASE UR QL STRIP: NORMAL
N GONORRHOEA RRNA SPEC QL NAA+PROBE: SIGNIFICANT CHANGE UP
NITRITE UR QL STRIP: NORMAL
PH UR STRIP: 5
PROT UR STRIP-MCNC: NORMAL
SP GR UR STRIP: 1.02
SPECIMEN SOURCE: SIGNIFICANT CHANGE UP

## 2022-05-05 NOTE — PLAN
[FreeTextEntry1] : \par 47 yo P4 amenorrheic 2/2 total hysterectomy presents with 6 months of with vulvar dryness and itchiness.\par \par # Vulvar itchiness and dryness\par - Likely due to perimenopausal state. \par - Normal appearing vulva\par - Estrace cream rx sent\par \par # HCM\par - GI referral sent for colonoscopy\par - UTD on mammogram (9/2021)\par - Paps no longer needed\par [] f/u UA\par [] GC/CT sent in error instead of Affirm, however low suspicion for trich, BV or yeast infection\par \par d/w Dr. Cortes\par YINA Sauceda, PGY1

## 2022-05-05 NOTE — HISTORY OF PRESENT ILLNESS
[FreeTextEntry1] : 47yo P4 s/p total hysterectomy 2018 for fibroids presents for annual. She reports that since Dec 2021 she has had vulvar itchiness and dryness that comes and goes. She wears a panty liner and uses bar soap to clean her vulva. She has tried various creams to treat the itch which help briefly but it keeps returning. Patient still has her right ovary and reports that her mother went through menopause in her 40s. She denies dysuria, dyspareunia, vaginal itchiness or burning or abnormal discharge. Patient requests a UA. She is sexually active with one partner and declines STI screening. \par \par Pap 2018 NILM, HRHPV neg\par Mammogram 2021 BIRADS 1\par Has not had a colonoscopy.\par \par Ob Hx:  x4\par Gyn Hx: h/o normal paps\par Med Hx: papillary thyroid cancer s/p resection\par Surg: LSC assisted vaginal hysterectomy, LSO, RS, thyroid Ca sgy\par Meds: Synthroid

## 2022-05-10 DIAGNOSIS — N89.8 OTHER SPECIFIED NONINFLAMMATORY DISORDERS OF VAGINA: ICD-10-CM

## 2022-05-17 ENCOUNTER — RESULT CHARGE (OUTPATIENT)
Age: 47
End: 2022-05-17

## 2022-05-18 ENCOUNTER — APPOINTMENT (OUTPATIENT)
Dept: CARDIOLOGY | Facility: CLINIC | Age: 47
End: 2022-05-18
Payer: MEDICAID

## 2022-05-18 ENCOUNTER — NON-APPOINTMENT (OUTPATIENT)
Age: 47
End: 2022-05-18

## 2022-05-18 VITALS
SYSTOLIC BLOOD PRESSURE: 142 MMHG | HEART RATE: 73 BPM | HEIGHT: 64 IN | DIASTOLIC BLOOD PRESSURE: 96 MMHG | WEIGHT: 293 LBS | OXYGEN SATURATION: 98 % | BODY MASS INDEX: 50.02 KG/M2

## 2022-05-18 VITALS — TEMPERATURE: 98.2 F

## 2022-05-18 PROCEDURE — 99214 OFFICE O/P EST MOD 30 MIN: CPT | Mod: 25

## 2022-05-18 PROCEDURE — 93000 ELECTROCARDIOGRAM COMPLETE: CPT

## 2022-05-18 NOTE — DISCUSSION/SUMMARY
[Hypertension] : hypertension [Outpatient Evaluation] : outpatient evaluation [Ambulatory BP Monitoring] : ambulatory blood pressure monitoring [Medication Changes Per Orders] : Medication changes are as documented in orders [Venous Insufficiency] : venous insufficiency [Stable] : stable [Stress Test Treadmill] : an exercise treadmill test [Not Responding to Treatment] : not responding to treatment [Stress Testing] : stress testing [de-identified] : sinus tachycardia [de-identified] : did not take diazide today yet, doesn’t like to take in day if traveling due to need to urinate [de-identified] : inc valsarten to 300 mg [de-identified] : vasc f/u if re-occurs, better off norvasc

## 2022-05-18 NOTE — REASON FOR VISIT
[FreeTextEntry1] : pt h/o swollen feet, more at end of day, left > rt, now better off norvasc, on diazide\par no sob or sscp or palps\par

## 2022-05-19 ENCOUNTER — APPOINTMENT (OUTPATIENT)
Dept: OBGYN | Facility: CLINIC | Age: 47
End: 2022-05-19

## 2022-06-01 ENCOUNTER — APPOINTMENT (OUTPATIENT)
Dept: PEDIATRIC ALLERGY IMMUNOLOGY | Facility: CLINIC | Age: 47
End: 2022-06-01
Payer: MEDICAID

## 2022-06-01 ENCOUNTER — LABORATORY RESULT (OUTPATIENT)
Age: 47
End: 2022-06-01

## 2022-06-01 ENCOUNTER — NON-APPOINTMENT (OUTPATIENT)
Age: 47
End: 2022-06-01

## 2022-06-01 VITALS
HEART RATE: 87 BPM | HEIGHT: 64 IN | BODY MASS INDEX: 50.02 KG/M2 | SYSTOLIC BLOOD PRESSURE: 148 MMHG | TEMPERATURE: 96.8 F | WEIGHT: 293 LBS | DIASTOLIC BLOOD PRESSURE: 95 MMHG | OXYGEN SATURATION: 98 %

## 2022-06-01 PROCEDURE — 95004 PERQ TESTS W/ALRGNC XTRCS: CPT

## 2022-06-01 PROCEDURE — 99214 OFFICE O/P EST MOD 30 MIN: CPT | Mod: 25

## 2022-06-01 PROCEDURE — 94010 BREATHING CAPACITY TEST: CPT | Mod: 59

## 2022-06-01 RX ORDER — AMOXICILLIN AND CLAVULANATE POTASSIUM 875; 125 MG/1; MG/1
875-125 TABLET, COATED ORAL
Qty: 1 | Refills: 0 | Status: DISCONTINUED | COMMUNITY
Start: 2021-12-16 | End: 2022-06-01

## 2022-06-01 NOTE — REASON FOR VISIT
[Routine Follow-Up] : a routine follow-up visit for [Runny Nose] : runny nose [To Insect Venom] : allergy to insect venom

## 2022-06-06 LAB
A ALTERNATA IGE QN: <0.1 KUA/L
A FUMIGATUS IGE QN: <0.1 KUA/L
BOXELDER IGE QN: 0.12 KUA/L
C HERBARUM IGE QN: <0.1 KUA/L
CAT DANDER IGE QN: <0.1 KUA/L
COTTONWOOD IGE QN: 0.13 KUA/L
DEPRECATED A ALTERNATA IGE RAST QL: 0
DEPRECATED A FUMIGATUS IGE RAST QL: 0
DEPRECATED BOXELDER IGE RAST QL: NORMAL
DEPRECATED C HERBARUM IGE RAST QL: 0
DEPRECATED CAT DANDER IGE RAST QL: 0
DEPRECATED COTTONWOOD IGE RAST QL: NORMAL
DEPRECATED DOG DANDER IGE RAST QL: 0
DEPRECATED ENGL PLANTAIN IGE RAST QL: NORMAL
DEPRECATED FIREBUSH IGE RAST QL: NORMAL
DEPRECATED GOOSEFOOT IGE RAST QL: NORMAL
DEPRECATED KENT BLUE GRASS IGE RAST QL: NORMAL
DEPRECATED MARSH ELDER IGE RAST QL: NORMAL
DEPRECATED P NOTATUM IGE RAST QL: 0
DEPRECATED ROACH IGE RAST QL: NORMAL
DEPRECATED S ROSTRATA IGE RAST QL: 0
DEPRECATED SALTWORT IGE RAST QL: NORMAL
DEPRECATED SILVER BIRCH IGE RAST QL: NORMAL
DEPRECATED WHITE ASH IGE RAST QL: NORMAL
DOG DANDER IGE QN: <0.1 KUA/L
ENGL PLANTAIN IGE QN: 0.13 KUA/L
FIREBUSH IGE QN: 0.12 KUA/L
GOOSEFOOT IGE QN: 0.17 KUA/L
KENT BLUE GRASS IGE QN: 0.1 KUA/L
MARSH ELDER IGE QN: 0.25 KUA/L
P NOTATUM IGE QN: <0.1 KUA/L
ROACH IGE QN: 0.25 KUA/L
S ROSTRATA IGE QN: <0.1 KUA/L
SALTWORT IGE QN: 0.16 KUA/L
SILVER BIRCH IGE QN: 0.12 KUA/L
WHITE ASH IGE QN: 0.12 KUA/L
WHITE ELM IGE QN: 0.19 KUA/L
WHITE ELM IGE QN: NORMAL

## 2022-06-06 NOTE — IMPRESSION
[Spirometry] : Spirometry [Normal Spirometry] : spirometry normal [Allergy Testing Dust Mite] : dust mites [Allergy Testing Mixed Feathers] : feathers [Allergy Testing Cockroach] : cockroach [Allergy Testing Dog] : dog [Allergy Testing Cat] : cat [] : molds [Allergy Testing Trees] : trees [Allergy Testing Weeds] : weeds [Allergy Testing Grasses] : grasses

## 2022-06-06 NOTE — PHYSICAL EXAM
[Alert] : alert [Well Nourished] : well nourished [Healthy Appearance] : healthy appearance [No Acute Distress] : no acute distress [Well Developed] : well developed [No Discharge] : no discharge [No Photophobia] : no photophobia [Sclera Not Icteric] : sclera not icteric [Normal Lips/Tongue] : the lips and tongue were normal [Normal Outer Ear/Nose] : the ears and nose were normal in appearance [No Thrush] : no thrush [Supple] : the neck was supple [Normal Rate and Effort] : normal respiratory rhythm and effort [No Crackles] : no crackles [No Retractions] : no retractions [Bilateral Audible Breath Sounds] : bilateral audible breath sounds [Normal Rate] : heart rate was normal  [Normal S1, S2] : normal S1 and S2 [No murmur] : no murmur [Regular Rhythm] : with a regular rhythm [Skin Intact] : skin intact  [No Rash] : no rash [No Skin Lesions] : no skin lesions [No clubbing] : no clubbing [No Edema] : no edema [No Cyanosis] : no cyanosis [Normal Mood] : mood was normal [Normal Affect] : affect was normal [Alert, Awake, Oriented as Age-Appropriate] : alert, awake, oriented as age appropriate [Pale mucosa] : no pale mucosa [Wheezing] : no wheezing was heard [de-identified] : obese [de-identified] : obese

## 2022-06-06 NOTE — HISTORY OF PRESENT ILLNESS
[< or = 2 days/wk] : < than or = 2 days/wk [0 x/month] : 0 x/month [16 - 19] : 16 - 19 [de-identified] : 46 year old female with asthma and allergic rhinitis who comes in for follow up today.  The asthma has been stable so far since the last conversation.  This weekend, the patient had exposure to pollens outside the house and had exacerbation of asthma. The patient feels the allergies are triggers for the asthma [Cough] : no cough [FreeTextEntry7] : 19

## 2022-06-14 ENCOUNTER — LABORATORY RESULT (OUTPATIENT)
Age: 47
End: 2022-06-14

## 2022-06-14 ENCOUNTER — APPOINTMENT (OUTPATIENT)
Dept: SURGERY | Facility: CLINIC | Age: 47
End: 2022-06-14
Payer: MEDICAID

## 2022-06-14 PROCEDURE — 99214 OFFICE O/P EST MOD 30 MIN: CPT | Mod: 25

## 2022-06-14 NOTE — PHYSICAL EXAM
[de-identified] : well healed scar [Midline] : located in midline position [Normal] : orientation to person, place, and time: normal

## 2022-06-14 NOTE — ASSESSMENT
[FreeTextEntry1] : s/p Thyroidectomy and neck dissection \par blood work in office today\par f/u Dr Miles\par f/u 6 months

## 2022-06-14 NOTE — HISTORY OF PRESENT ILLNESS
[de-identified] : Pt 6 months s/p Right neck dissection for Thyroid malignancy and 22 months s/p thyroidectomy and KAHN. Pt feels well on Synthroid 50 mcg daily. Pt has been unable to get appointment with DR Miles as of yet

## 2022-06-15 ENCOUNTER — NON-APPOINTMENT (OUTPATIENT)
Age: 47
End: 2022-06-15

## 2022-06-15 LAB
T3 SERPL-MCNC: 119 NG/DL
T4 FREE SERPL-MCNC: 2.2 NG/DL
TSH SERPL-ACNC: 0.3 UIU/ML

## 2022-06-16 ENCOUNTER — NON-APPOINTMENT (OUTPATIENT)
Age: 47
End: 2022-06-16

## 2022-06-16 LAB
THYROGLOB AB SERPL-ACNC: 535 IU/ML
THYROGLOB SERPL-MCNC: <0.2 NG/ML

## 2022-06-17 ENCOUNTER — NON-APPOINTMENT (OUTPATIENT)
Age: 47
End: 2022-06-17

## 2022-06-21 ENCOUNTER — NON-APPOINTMENT (OUTPATIENT)
Age: 47
End: 2022-06-21

## 2022-06-27 ENCOUNTER — TRANSCRIPTION ENCOUNTER (OUTPATIENT)
Age: 47
End: 2022-06-27

## 2022-06-29 ENCOUNTER — TRANSCRIPTION ENCOUNTER (OUTPATIENT)
Age: 47
End: 2022-06-29

## 2022-08-04 ENCOUNTER — RX RENEWAL (OUTPATIENT)
Age: 47
End: 2022-08-04

## 2022-08-10 ENCOUNTER — APPOINTMENT (OUTPATIENT)
Dept: INTERNAL MEDICINE | Facility: CLINIC | Age: 47
End: 2022-08-10

## 2022-08-12 ENCOUNTER — APPOINTMENT (OUTPATIENT)
Dept: ENDOCRINOLOGY | Facility: CLINIC | Age: 47
End: 2022-08-12

## 2022-08-12 VITALS
DIASTOLIC BLOOD PRESSURE: 80 MMHG | HEIGHT: 64 IN | TEMPERATURE: 97.6 F | WEIGHT: 293 LBS | OXYGEN SATURATION: 96 % | BODY MASS INDEX: 50.02 KG/M2 | SYSTOLIC BLOOD PRESSURE: 140 MMHG | HEART RATE: 89 BPM

## 2022-08-12 PROCEDURE — 99214 OFFICE O/P EST MOD 30 MIN: CPT

## 2022-08-15 LAB
25(OH)D3 SERPL-MCNC: 34.2 NG/ML
ESTIMATED AVERAGE GLUCOSE: 128 MG/DL
HBA1C MFR BLD HPLC: 6.1 %
T4 FREE SERPL-MCNC: 2 NG/DL
THYROGLOB AB SERPL-ACNC: 725 IU/ML
THYROGLOB SERPL-MCNC: <0.2 NG/ML
TSH SERPL-ACNC: 0.38 UIU/ML

## 2022-08-15 NOTE — PHYSICAL EXAM
[Alert] : alert [Well Nourished] : well nourished [No Acute Distress] : no acute distress [Well Developed] : well developed [Normal Sclera/Conjunctiva] : normal sclera/conjunctiva [EOMI] : extra ocular movement intact [No Proptosis] : no proptosis [Normal Oropharynx] : the oropharynx was normal [Thyroid Not Enlarged] : the thyroid was not enlarged [No Thyroid Nodules] : no palpable thyroid nodules [No Respiratory Distress] : no respiratory distress [No Accessory Muscle Use] : no accessory muscle use [Clear to Auscultation] : lungs were clear to auscultation bilaterally [Normal S1, S2] : normal S1 and S2 [Normal Rate] : heart rate was normal [Regular Rhythm] : with a regular rhythm [No Edema] : no peripheral edema [Pedal Pulses Normal] : the pedal pulses are present [Normal Bowel Sounds] : normal bowel sounds [Not Tender] : non-tender [Not Distended] : not distended [Soft] : abdomen soft [Normal Anterior Cervical Nodes] : no anterior cervical lymphadenopathy [No Spinal Tenderness] : no spinal tenderness [Spine Straight] : spine straight [No Stigmata of Cushings Syndrome] : no stigmata of Cushings Syndrome [Normal Gait] : normal gait [Normal Strength/Tone] : muscle strength and tone were normal [No Rash] : no rash [Normal Reflexes] : deep tendon reflexes were 2+ and symmetric [No Tremors] : no tremors [Oriented x3] : oriented to person, place, and time [Acanthosis Nigricans] : no acanthosis nigricans [de-identified] : Obese woman, with BMI of 53.73

## 2022-08-15 NOTE — ASSESSMENT
[FreeTextEntry1] : 46 year old F presented for followup of papillary thyroid cancer s/p total thyroidectomy \par \par 1. Papillary thyroid cancer in setting of multinodular goiter:\par - Patient is clinically and biochemically euthyroid\par - s/p total thyroidectomy 8/12/2020 with Dr Swanson. Pathology showed 2.5cm right PTC classical variant with 0.3cm microcarcinoma in isthmus, no ETE with 2 LN positive both LV VI, largest measuring 0.9cm.\par - s/p KAHN 10/20 98.3 mCi 131, post therapy scan showed un-dectable TG in presence of elevated TG AB\par - US in Dec 2020 showing: \par Multiple abnormal lymph nodes are seen involving the right neck. Representative lymph node right level 3 position measures 1.5 x 1.7 x 1.0 cm in size with an adjacent lymph node. Small echogenic foci seen suggesting microcalcifications. \par On left neck there is a small 1.0cm lymph node with questionable significance. \par -FNA done for RIGHT level 3 lymph node measuring 1.7cm containing calcification, positive for maligiant cells, metastatic papillary thyroid carcinoma. \par -S/P second surgery right neck dissection\par -had infections afterwards, but doing better. \par -Will contact me within a month regarding KAHN, I recommended that she proceed with it. \par \par 2. Prediabetes:\par - HbA1c 6.0% Aug 2020 , continue with lifestyle changes. \par - Will continue to work on diet and exercise.\par \par 3. Obesity:\par - Counseled about diet, weight loss and exercise \par -Patient is considering bariatric surgical intervention. We will continue this discussion.\par \par 4. Essential HTN:\par - Continue with current regimen \par - Continue with Triamterene -HCTZ 37.5-25\par - Amlodipine 5mg once daily. \par

## 2022-08-15 NOTE — HISTORY OF PRESENT ILLNESS
[FreeTextEntry1] : Patient is a 47-year-old woman here to establish care for papillary thyroid cancer, obesity, prediabetes.\par \par Patient underwent total thyroidectomy with Dr Swanson (8/12/2020). Pathology showed 2.5cm right PTC classical variant with 0.3cm microcarcinoma in isthmus, no ETE with 2 LN positive both LV VI, largest measuring 0.9cm.\par 9/2020 TSH 32.9, Free T4 1.1, TT3 69. TG 0.2, TGAB 4598\par Patient underwent radioactive iodine treatment on October 2020, stimulated thyroglobulin <0.2, with positive TG antibody.  TSH was 84.3.\par \par She had a thyroid ultrasound in December 10, 2020.\par It was noted that there were multiple abnormal lymph nodes are seen involving the right neck.  Representative lymph node right level 3 position measuring 1.5 x 1.7 x 1.0 cm in size with an adjacent lymph node.  Small echogenic foci seen suggesting microcalcifications.  On the left neck, there is a small 1.0 cm lymph node with questionable significance.\par \par The level 3 lymph node was s/p FNA 8/11/2021, which turned out to be positive for malignant cells. \par \par Underwent right neck dissection on 11/17/2021.  Level 2: 1 out of 14 lymph node positive for metastatic papillary thyroid cancer.  Level 3: 4 out of 7 lymph node positive for metastatic papillary thyroid cancer.  Level 4: 1 out of 10 lymph node positive for metastatic papillary thyroid cancer.  Level 5: Fibroadipose tissue negative for metastatic carcinoma.  2 additional lymph node negative for metastatic carcinoma.\par \par Patient reported a complicated postoperative course with infection.  She is doing better.  Currently taking levothyroxine 250 mcg once daily.  She is debating about retreating with radioactive iodine.\par \par Regarding obesity, patient states that she has been working on diet.  She is a mother of 4 children.  Oldest is 27 years old.\par \par Regarding prediabetes, on July 16, 2020, A1c was 5.8%, he has been averaging between 5.8% to 6.3% since February 2015.  Counseled patient on diet and exercise extensively.\par \par

## 2022-08-17 LAB
CLINICAL BIOCHEMIST REVIEW: NORMAL
THYROGLOB SERPL-MCNC: <0.2 NG/ML

## 2022-09-01 NOTE — PATIENT PROFILE ADULT - FUNCTIONAL ASSESSMENT - BASIC MOBILITY 2.
4 = No assist / stand by assistance Complex Repair And Flap Additional Text (Will Appearing After The Standard Complex Repair Text): The complex repair was not sufficient to completely close the primary defect. The remaining additional defect was repaired with the flap mentioned below.

## 2022-09-15 ENCOUNTER — APPOINTMENT (OUTPATIENT)
Dept: ULTRASOUND IMAGING | Facility: CLINIC | Age: 47
End: 2022-09-15

## 2022-09-15 ENCOUNTER — OUTPATIENT (OUTPATIENT)
Dept: OUTPATIENT SERVICES | Facility: HOSPITAL | Age: 47
LOS: 1 days | End: 2022-09-15
Payer: MEDICAID

## 2022-09-15 DIAGNOSIS — Z90.710 ACQUIRED ABSENCE OF BOTH CERVIX AND UTERUS: Chronic | ICD-10-CM

## 2022-09-15 DIAGNOSIS — Z98.51 TUBAL LIGATION STATUS: Chronic | ICD-10-CM

## 2022-09-15 DIAGNOSIS — E89.0 POSTPROCEDURAL HYPOTHYROIDISM: Chronic | ICD-10-CM

## 2022-09-15 DIAGNOSIS — C73 MALIGNANT NEOPLASM OF THYROID GLAND: ICD-10-CM

## 2022-09-15 PROCEDURE — 76536 US EXAM OF HEAD AND NECK: CPT

## 2022-09-15 PROCEDURE — 76536 US EXAM OF HEAD AND NECK: CPT | Mod: 26

## 2022-10-06 ENCOUNTER — TRANSCRIPTION ENCOUNTER (OUTPATIENT)
Age: 47
End: 2022-10-06

## 2022-10-07 ENCOUNTER — TRANSCRIPTION ENCOUNTER (OUTPATIENT)
Age: 47
End: 2022-10-07

## 2022-11-07 ENCOUNTER — TRANSCRIPTION ENCOUNTER (OUTPATIENT)
Age: 47
End: 2022-11-07

## 2022-11-14 ENCOUNTER — TRANSCRIPTION ENCOUNTER (OUTPATIENT)
Age: 47
End: 2022-11-14

## 2022-11-15 NOTE — DISCHARGE NOTE PROVIDER - EXTENDED VTE YES NO FOR MLM ENOXAPARIN
Rounding completed    No complaints at this time  Awaiting lab/imaging results  Elimination assistance offered  No additional needs at this time  Call light within reach, will update patient with more information  Will continue to monitor ,

## 2022-11-16 ENCOUNTER — NON-APPOINTMENT (OUTPATIENT)
Age: 47
End: 2022-11-16

## 2022-11-16 ENCOUNTER — APPOINTMENT (OUTPATIENT)
Dept: CARDIOLOGY | Facility: CLINIC | Age: 47
End: 2022-11-16

## 2022-11-17 ENCOUNTER — APPOINTMENT (OUTPATIENT)
Dept: INTERNAL MEDICINE | Facility: CLINIC | Age: 47
End: 2022-11-17

## 2022-11-17 VITALS
DIASTOLIC BLOOD PRESSURE: 80 MMHG | HEART RATE: 86 BPM | RESPIRATION RATE: 17 BRPM | WEIGHT: 293 LBS | OXYGEN SATURATION: 95 % | TEMPERATURE: 97.3 F | BODY MASS INDEX: 50.02 KG/M2 | SYSTOLIC BLOOD PRESSURE: 141 MMHG | HEIGHT: 64 IN

## 2022-11-17 DIAGNOSIS — Z86.39 PERSONAL HISTORY OF OTHER ENDOCRINE, NUTRITIONAL AND METABOLIC DISEASE: ICD-10-CM

## 2022-11-17 DIAGNOSIS — Z12.11 ENCOUNTER FOR SCREENING FOR MALIGNANT NEOPLASM OF COLON: ICD-10-CM

## 2022-11-17 DIAGNOSIS — Z11.3 ENCOUNTER FOR SCREENING FOR INFECTIONS WITH A PREDOMINANTLY SEXUAL MODE OF TRANSMISSION: ICD-10-CM

## 2022-11-17 DIAGNOSIS — J30.89 OTHER ALLERGIC RHINITIS: ICD-10-CM

## 2022-11-17 DIAGNOSIS — M25.472 EFFUSION, RIGHT ANKLE: ICD-10-CM

## 2022-11-17 DIAGNOSIS — N90.89 OTHER SPECIFIED NONINFLAMMATORY DISORDERS OF VULVA AND PERINEUM: ICD-10-CM

## 2022-11-17 DIAGNOSIS — Z01.818 ENCOUNTER FOR OTHER PREPROCEDURAL EXAMINATION: ICD-10-CM

## 2022-11-17 DIAGNOSIS — Z86.79 PERSONAL HISTORY OF OTHER DISEASES OF THE CIRCULATORY SYSTEM: ICD-10-CM

## 2022-11-17 DIAGNOSIS — Z92.29 PERSONAL HISTORY OF OTHER DRUG THERAPY: ICD-10-CM

## 2022-11-17 DIAGNOSIS — M25.471 EFFUSION, RIGHT ANKLE: ICD-10-CM

## 2022-11-17 PROCEDURE — 99396 PREV VISIT EST AGE 40-64: CPT

## 2022-11-17 RX ORDER — VALSARTAN 160 MG/1
160 TABLET, COATED ORAL
Qty: 30 | Refills: 0 | Status: DISCONTINUED | COMMUNITY
Start: 2021-09-08 | End: 2022-11-17

## 2022-11-17 RX ORDER — TRIAMTERENE AND HYDROCHLOROTHIAZIDE 25; 37.5 MG/1; MG/1
37.5-25 TABLET ORAL
Qty: 90 | Refills: 1 | Status: COMPLETED | COMMUNITY
Start: 2018-05-02 | End: 2022-11-17

## 2022-11-17 RX ORDER — DOCOSANOL 100 MG/G
10 CREAM TOPICAL DAILY
Qty: 1 | Refills: 0 | Status: DISCONTINUED | COMMUNITY
Start: 2021-10-06 | End: 2022-11-17

## 2022-11-17 RX ORDER — FLUTICASONE PROPIONATE 50 UG/1
50 SPRAY, METERED NASAL
Qty: 1 | Refills: 2 | Status: COMPLETED | COMMUNITY
Start: 2017-01-13 | End: 2022-11-17

## 2022-11-21 ENCOUNTER — TRANSCRIPTION ENCOUNTER (OUTPATIENT)
Age: 47
End: 2022-11-21

## 2022-11-22 ENCOUNTER — LABORATORY RESULT (OUTPATIENT)
Age: 47
End: 2022-11-22

## 2022-11-23 LAB
ALBUMIN SERPL ELPH-MCNC: 4.3 G/DL
ALP BLD-CCNC: 82 U/L
ALT SERPL-CCNC: 18 U/L
ANION GAP SERPL CALC-SCNC: 12 MMOL/L
AST SERPL-CCNC: 14 U/L
BASOPHILS # BLD AUTO: 0.05 K/UL
BASOPHILS NFR BLD AUTO: 0.6 %
BILIRUB SERPL-MCNC: 0.3 MG/DL
BUN SERPL-MCNC: 15 MG/DL
CALCIUM SERPL-MCNC: 9.9 MG/DL
CHLORIDE SERPL-SCNC: 99 MMOL/L
CHOLEST SERPL-MCNC: 156 MG/DL
CO2 SERPL-SCNC: 29 MMOL/L
CREAT SERPL-MCNC: 0.47 MG/DL
EGFR: 117 ML/MIN/1.73M2
EOSINOPHIL # BLD AUTO: 0.17 K/UL
EOSINOPHIL NFR BLD AUTO: 2.1 %
ESTIMATED AVERAGE GLUCOSE: 134 MG/DL
GLUCOSE SERPL-MCNC: 87 MG/DL
HBA1C MFR BLD HPLC: 6.3 %
HCT VFR BLD CALC: 47.3 %
HDLC SERPL-MCNC: 84 MG/DL
HGB BLD-MCNC: 14.2 G/DL
IMM GRANULOCYTES NFR BLD AUTO: 0.5 %
LDLC SERPL CALC-MCNC: 58 MG/DL
LYMPHOCYTES # BLD AUTO: 2.11 K/UL
LYMPHOCYTES NFR BLD AUTO: 26.2 %
MAN DIFF?: NORMAL
MCHC RBC-ENTMCNC: 25.4 PG
MCHC RBC-ENTMCNC: 30 GM/DL
MCV RBC AUTO: 84.5 FL
MONOCYTES # BLD AUTO: 0.45 K/UL
MONOCYTES NFR BLD AUTO: 5.6 %
NEUTROPHILS # BLD AUTO: 5.24 K/UL
NEUTROPHILS NFR BLD AUTO: 65 %
NONHDLC SERPL-MCNC: 72 MG/DL
PLATELET # BLD AUTO: 252 K/UL
POTASSIUM SERPL-SCNC: 4.5 MMOL/L
PROT SERPL-MCNC: 7.6 G/DL
RBC # BLD: 5.6 M/UL
RBC # FLD: 14.2 %
SODIUM SERPL-SCNC: 140 MMOL/L
TRIGL SERPL-MCNC: 70 MG/DL
WBC # FLD AUTO: 8.06 K/UL

## 2022-12-01 ENCOUNTER — TRANSCRIPTION ENCOUNTER (OUTPATIENT)
Age: 47
End: 2022-12-01

## 2023-01-12 ENCOUNTER — APPOINTMENT (OUTPATIENT)
Dept: SURGERY | Facility: CLINIC | Age: 48
End: 2023-01-12
Payer: MEDICAID

## 2023-01-12 ENCOUNTER — LABORATORY RESULT (OUTPATIENT)
Age: 48
End: 2023-01-12

## 2023-01-12 PROCEDURE — 99214 OFFICE O/P EST MOD 30 MIN: CPT | Mod: 25

## 2023-01-12 NOTE — PHYSICAL EXAM
[de-identified] : well healed scar [Midline] : located in midline position [Normal] : orientation to person, place, and time: normal

## 2023-01-12 NOTE — HISTORY OF PRESENT ILLNESS
[de-identified] : Pt 1 year s/p Right neck dissection doing well without complaints awaiting KAHN authorization from Dr Miles all reports reviewed

## 2023-01-12 NOTE — ASSESSMENT
[FreeTextEntry1] : s/p thyroidectomy and Right neck dissection\par blood work in office\par f/u Dr Miles\par scans and [place] per Dr Miles\par f/u 6 months

## 2023-01-13 ENCOUNTER — NON-APPOINTMENT (OUTPATIENT)
Age: 48
End: 2023-01-13

## 2023-01-13 LAB
T3 SERPL-MCNC: 112 NG/DL
T4 FREE SERPL-MCNC: 2.4 NG/DL
TSH SERPL-ACNC: 0.24 UIU/ML

## 2023-01-16 ENCOUNTER — NON-APPOINTMENT (OUTPATIENT)
Age: 48
End: 2023-01-16

## 2023-01-16 LAB
THYROGLOB AB SERPL-ACNC: 730 IU/ML
THYROGLOB SERPL-MCNC: <0.2 NG/ML

## 2023-01-17 ENCOUNTER — TRANSCRIPTION ENCOUNTER (OUTPATIENT)
Age: 48
End: 2023-01-17

## 2023-01-23 ENCOUNTER — OUTPATIENT (OUTPATIENT)
Dept: OUTPATIENT SERVICES | Facility: HOSPITAL | Age: 48
LOS: 1 days | End: 2023-01-23

## 2023-01-23 DIAGNOSIS — Z98.51 TUBAL LIGATION STATUS: Chronic | ICD-10-CM

## 2023-01-23 DIAGNOSIS — Z90.710 ACQUIRED ABSENCE OF BOTH CERVIX AND UTERUS: Chronic | ICD-10-CM

## 2023-01-23 DIAGNOSIS — E89.0 POSTPROCEDURAL HYPOTHYROIDISM: Chronic | ICD-10-CM

## 2023-01-23 DIAGNOSIS — Z00.8 ENCOUNTER FOR OTHER GENERAL EXAMINATION: ICD-10-CM

## 2023-01-26 ENCOUNTER — TRANSCRIPTION ENCOUNTER (OUTPATIENT)
Age: 48
End: 2023-01-26

## 2023-02-27 RX ORDER — KETOTIFEN FUMARATE 0.25 MG/ML
0.03 SOLUTION/ DROPS OPHTHALMIC
Qty: 1 | Refills: 1 | Status: ACTIVE | COMMUNITY
Start: 2022-06-27 | End: 1900-01-01

## 2023-03-02 ENCOUNTER — OUTPATIENT (OUTPATIENT)
Dept: OUTPATIENT SERVICES | Facility: HOSPITAL | Age: 48
LOS: 1 days | End: 2023-03-02

## 2023-03-02 DIAGNOSIS — Z90.710 ACQUIRED ABSENCE OF BOTH CERVIX AND UTERUS: Chronic | ICD-10-CM

## 2023-03-02 DIAGNOSIS — Z00.8 ENCOUNTER FOR OTHER GENERAL EXAMINATION: ICD-10-CM

## 2023-03-02 DIAGNOSIS — Z98.51 TUBAL LIGATION STATUS: Chronic | ICD-10-CM

## 2023-03-02 DIAGNOSIS — E89.0 POSTPROCEDURAL HYPOTHYROIDISM: Chronic | ICD-10-CM

## 2023-03-27 ENCOUNTER — RX RENEWAL (OUTPATIENT)
Age: 48
End: 2023-03-27

## 2023-04-10 ENCOUNTER — RESULT REVIEW (OUTPATIENT)
Age: 48
End: 2023-04-10

## 2023-04-10 ENCOUNTER — APPOINTMENT (OUTPATIENT)
Dept: MAMMOGRAPHY | Facility: CLINIC | Age: 48
End: 2023-04-10
Payer: MEDICAID

## 2023-04-10 ENCOUNTER — APPOINTMENT (OUTPATIENT)
Dept: ULTRASOUND IMAGING | Facility: CLINIC | Age: 48
End: 2023-04-10

## 2023-04-10 ENCOUNTER — OUTPATIENT (OUTPATIENT)
Dept: OUTPATIENT SERVICES | Facility: HOSPITAL | Age: 48
LOS: 1 days | End: 2023-04-10
Payer: MEDICAID

## 2023-04-10 DIAGNOSIS — Z98.51 TUBAL LIGATION STATUS: Chronic | ICD-10-CM

## 2023-04-10 DIAGNOSIS — E89.0 POSTPROCEDURAL HYPOTHYROIDISM: Chronic | ICD-10-CM

## 2023-04-10 DIAGNOSIS — Z90.710 ACQUIRED ABSENCE OF BOTH CERVIX AND UTERUS: Chronic | ICD-10-CM

## 2023-04-10 DIAGNOSIS — R92.2 INCONCLUSIVE MAMMOGRAM: ICD-10-CM

## 2023-04-10 PROCEDURE — 76641 ULTRASOUND BREAST COMPLETE: CPT | Mod: 26,50

## 2023-04-10 PROCEDURE — 77063 BREAST TOMOSYNTHESIS BI: CPT

## 2023-04-10 PROCEDURE — 76641 ULTRASOUND BREAST COMPLETE: CPT

## 2023-04-10 PROCEDURE — 77063 BREAST TOMOSYNTHESIS BI: CPT | Mod: 26

## 2023-04-10 PROCEDURE — 77067 SCR MAMMO BI INCL CAD: CPT | Mod: 26

## 2023-04-10 PROCEDURE — 77067 SCR MAMMO BI INCL CAD: CPT

## 2023-05-10 ENCOUNTER — NON-APPOINTMENT (OUTPATIENT)
Age: 48
End: 2023-05-10

## 2023-05-10 ENCOUNTER — APPOINTMENT (OUTPATIENT)
Dept: CARDIOLOGY | Facility: CLINIC | Age: 48
End: 2023-05-10
Payer: MEDICAID

## 2023-05-10 VITALS
DIASTOLIC BLOOD PRESSURE: 90 MMHG | WEIGHT: 293 LBS | HEART RATE: 85 BPM | OXYGEN SATURATION: 98 % | HEIGHT: 64 IN | BODY MASS INDEX: 50.02 KG/M2 | SYSTOLIC BLOOD PRESSURE: 142 MMHG

## 2023-05-10 PROCEDURE — 93000 ELECTROCARDIOGRAM COMPLETE: CPT

## 2023-05-10 PROCEDURE — 99215 OFFICE O/P EST HI 40 MIN: CPT | Mod: 25

## 2023-05-11 NOTE — DISCUSSION/SUMMARY
[Stable] : stable [Hypertension] : hypertension [Not Responding to Treatment] : not responding to treatment [Outpatient Evaluation] : outpatient evaluation [Ambulatory BP Monitoring] : ambulatory blood pressure monitoring [Medication Changes Per Orders] : Medication changes are as documented in orders [Venous Insufficiency] : venous insufficiency [Minutes Spent: ___] : for [unfilled] ~Uminutes [Weight Loss] : weight loss [Low Sodium Diet] : low sodium diet [de-identified] : sinus tachycardia [de-identified] : poor diet [de-identified] : cont valsarten  300 mg [de-identified] : vasc f/u if re-occurs, better off norvasc [FreeTextEntry2] : last seen 1 year ago

## 2023-05-17 ENCOUNTER — NON-APPOINTMENT (OUTPATIENT)
Age: 48
End: 2023-05-17

## 2023-05-17 ENCOUNTER — APPOINTMENT (OUTPATIENT)
Dept: PEDIATRIC ALLERGY IMMUNOLOGY | Facility: CLINIC | Age: 48
End: 2023-05-17
Payer: MEDICAID

## 2023-05-17 VITALS — BODY MASS INDEX: 53.04 KG/M2 | OXYGEN SATURATION: 95 % | WEIGHT: 293 LBS | HEART RATE: 78 BPM

## 2023-05-17 PROCEDURE — 94010 BREATHING CAPACITY TEST: CPT

## 2023-05-17 PROCEDURE — 99214 OFFICE O/P EST MOD 30 MIN: CPT | Mod: 25

## 2023-05-17 NOTE — HISTORY OF PRESENT ILLNESS
[de-identified] : 47 year old female with allergic rhinitis and asthma, who comes in for a follow up today. \par Ms Christianson states that this spring has been much better than last although here and there, there is some sneezing that is mild.  There is no use of albuterol in a while.  no use of Xyzal and uses Flonase intermittently.

## 2023-05-17 NOTE — PHYSICAL EXAM
[Alert] : alert [Well Nourished] : well nourished [No Acute Distress] : no acute distress [Well Developed] : well developed [No Discharge] : no discharge [Normal Nasal Mucosa] : the nasal mucosa was normal [Normal Outer Ear/Nose] : the ears and nose were normal in appearance [Normal Rate and Effort] : normal respiratory rhythm and effort [No Retractions] : no retractions [Normal Rate] : heart rate was normal  [Normal S1, S2] : normal S1 and S2 [Regular Rhythm] : with a regular rhythm [Skin Intact] : skin intact  [No Rash] : no rash [Judgment and Insight Age Appropriate] : judgement and insight is age appropriate [Alert, Awake, Oriented as Age-Appropriate] : alert, awake, oriented as age appropriate [Conjunctival Erythema] : no conjunctival erythema [Wheezing] : no wheezing was heard [de-identified] : obese

## 2023-05-17 NOTE — REVIEW OF SYSTEMS
[Sneezing] : sneezing [Nl] : Hematologic/Lymphatic [Rhinorrhea] : no rhinorrhea [Urticaria] : no urticaria [FreeTextEntry2] : obese

## 2023-05-17 NOTE — REASON FOR VISIT
[Routine Follow-Up] : a routine follow-up visit for [Runny Nose] : runny nose [Asthma] : asthma [Family Member] : family member

## 2023-05-30 ENCOUNTER — RX RENEWAL (OUTPATIENT)
Age: 48
End: 2023-05-30

## 2023-06-08 ENCOUNTER — RX RENEWAL (OUTPATIENT)
Age: 48
End: 2023-06-08

## 2023-07-26 ENCOUNTER — APPOINTMENT (OUTPATIENT)
Dept: ENDOCRINOLOGY | Facility: CLINIC | Age: 48
End: 2023-07-26
Payer: MEDICAID

## 2023-07-26 DIAGNOSIS — C73 MALIGNANT NEOPLASM OF THYROID GLAND: ICD-10-CM

## 2023-07-26 DIAGNOSIS — E66.01 MORBID (SEVERE) OBESITY DUE TO EXCESS CALORIES: ICD-10-CM

## 2023-07-26 PROCEDURE — 99214 OFFICE O/P EST MOD 30 MIN: CPT | Mod: 95

## 2023-07-26 NOTE — PHYSICAL EXAM
[Alert] : alert [Well Nourished] : well nourished [Well Healed Scar] : well healed scar [Oriented x3] : oriented to person, place, and time [Normal Affect] : the affect was normal [Normal Insight/Judgement] : insight and judgment were intact [Normal Mood] : the mood was normal

## 2023-07-26 NOTE — ASSESSMENT
[FreeTextEntry1] : 48 year old F presented for followup of papillary thyroid cancer s/p total thyroidectomy \par \par 1. Papillary thyroid cancer in setting of multinodular goiter:\par - Patient is clinically and biochemically euthyroid\par - s/p total thyroidectomy 8/12/2020 with Dr Swanson. Pathology showed 2.5cm right PTC classical variant with 0.3cm microcarcinoma in isthmus, no ETE with 2 LN positive both LV VI, largest measuring 0.9cm.\par - s/p KAHN 10/20 98.3 mCi 131, post therapy scan showed un-dectable TG in presence of elevated TG AB\par - US in Dec 2020 showing: \par Multiple abnormal lymph nodes are seen involving the right neck. Representative lymph node right level 3 position measures 1.5 x 1.7 x 1.0 cm in size with an adjacent lymph node. Small echogenic foci seen suggesting microcalcifications. \par On left neck there is a small 1.0cm lymph node with questionable significance. \par -FNA done for RIGHT level 3 lymph node measuring 1.7cm containing calcification, positive for maligiant cells, metastatic papillary thyroid carcinoma. \par -S/P second surgery right neck dissection in Sept 2022\par -Currently taking LT4 250mcg daily. Patient recently had labs done can be found on the [facility], thyroglobulin level <0.20, thyroglobulin antibody 621, TSH 0.45.\par -Currently on week 2 of the low iodine diet.  Patient is going to go for uptake and scan next week with nuclear medicine and proceed with second dosage of radioactive iodine treatment for remnant ablation.\par \par 2. Prediabetes:\par   Continue with lifestyle modifications\par \par 3. Obesity:\par - Counseled about diet, weight loss and exercise \par -Patient is considering bariatric surgical intervention. We will continue this discussion.\par \par

## 2023-07-26 NOTE — HISTORY OF PRESENT ILLNESS
[FreeTextEntry1] : Patient is a 48-year-old woman here to establish care for papillary thyroid cancer, obesity, prediabetes.\par \par Patient underwent total thyroidectomy with Dr Swanson (8/12/2020). Pathology showed 2.5cm right PTC classical variant with 0.3cm microcarcinoma in isthmus, no ETE with 2 LN positive both LV VI, largest measuring 0.9cm.\par 9/2020 TSH 32.9, Free T4 1.1, TT3 69. TG 0.2, TGAB 4598\par Patient underwent radioactive iodine treatment on October 2020, stimulated thyroglobulin <0.2, with positive TG antibody.  TSH was 84.3.\par \par She had a thyroid ultrasound in December 10, 2020.\par It was noted that there were multiple abnormal lymph nodes are seen involving the right neck.  Representative lymph node right level 3 position measuring 1.5 x 1.7 x 1.0 cm in size with an adjacent lymph node.  Small echogenic foci seen suggesting microcalcifications.  On the left neck, there is a small 1.0 cm lymph node with questionable significance.\par \par The level 3 lymph node was s/p FNA 8/11/2021, which turned out to be positive for malignant cells. \par \par Underwent right neck dissection on 11/17/2021.  Level 2: 1 out of 14 lymph node positive for metastatic papillary thyroid cancer.  Level 3: 4 out of 7 lymph node positive for metastatic papillary thyroid cancer.  Level 4: 1 out of 10 lymph node positive for metastatic papillary thyroid cancer.  Level 5: Fibroadipose tissue negative for metastatic carcinoma.  2 additional lymph node negative for metastatic carcinoma.\par \par Patient reported a complicated postoperative course with infection.  She is doing better.  Currently taking levothyroxine 250 mcg once daily.  She is debating about retreating with radioactive iodine.\par \par Regarding obesity, patient states that she has been working on diet.  She is a mother of 4 children.  Oldest is 27 years old.  Her daughter just got  this summer. \par \par Patient underwent second surgery for positive lymph node.  S/p right neck dissection in Sept 2022. US postop showed no evidence of abnormal appearing lymphadenopathy status post surgery . \par \par Regarding prediabetes, on diet and exercise.  \par

## 2023-07-26 NOTE — REASON FOR VISIT
[Home] : at home, [unfilled] , at the time of the visit. [Medical Office: ([facility])___] : at the medical office located in  [Patient] : the patient [This encounter was initiated by telehealth (audio with video) and converted to telephone (audio only) due to technical difficulties.] : This encounter was initiated by telehealth (audio with video) and converted to telephone (audio only) due to technical difficulties. [Follow - Up] : a follow-up visit [Thyroid Cancer] : thyroid cancer

## 2023-07-31 ENCOUNTER — APPOINTMENT (OUTPATIENT)
Dept: NUCLEAR MEDICINE | Facility: HOSPITAL | Age: 48
End: 2023-07-31

## 2023-07-31 ENCOUNTER — APPOINTMENT (OUTPATIENT)
Dept: ENDOCRINOLOGY | Facility: CLINIC | Age: 48
End: 2023-07-31

## 2023-08-01 ENCOUNTER — APPOINTMENT (OUTPATIENT)
Dept: NUCLEAR MEDICINE | Facility: HOSPITAL | Age: 48
End: 2023-08-01

## 2023-08-02 ENCOUNTER — APPOINTMENT (OUTPATIENT)
Dept: NUCLEAR MEDICINE | Facility: HOSPITAL | Age: 48
End: 2023-08-02

## 2023-08-04 ENCOUNTER — APPOINTMENT (OUTPATIENT)
Dept: NUCLEAR MEDICINE | Facility: HOSPITAL | Age: 48
End: 2023-08-04

## 2023-08-07 ENCOUNTER — APPOINTMENT (OUTPATIENT)
Dept: NUCLEAR MEDICINE | Facility: HOSPITAL | Age: 48
End: 2023-08-07

## 2023-08-08 ENCOUNTER — APPOINTMENT (OUTPATIENT)
Dept: NUCLEAR MEDICINE | Facility: HOSPITAL | Age: 48
End: 2023-08-08

## 2023-08-09 ENCOUNTER — APPOINTMENT (OUTPATIENT)
Dept: NUCLEAR MEDICINE | Facility: HOSPITAL | Age: 48
End: 2023-08-09

## 2023-08-11 ENCOUNTER — APPOINTMENT (OUTPATIENT)
Dept: NUCLEAR MEDICINE | Facility: HOSPITAL | Age: 48
End: 2023-08-11

## 2023-09-06 ENCOUNTER — NON-APPOINTMENT (OUTPATIENT)
Age: 48
End: 2023-09-06

## 2023-09-07 ENCOUNTER — LABORATORY RESULT (OUTPATIENT)
Age: 48
End: 2023-09-07

## 2023-09-07 ENCOUNTER — APPOINTMENT (OUTPATIENT)
Dept: SURGERY | Facility: CLINIC | Age: 48
End: 2023-09-07
Payer: MEDICAID

## 2023-09-07 DIAGNOSIS — C77.0 SECONDARY AND UNSPECIFIED MALIGNANT NEOPLASM OF LYMPH NODES OF HEAD, FACE AND NECK: ICD-10-CM

## 2023-09-07 PROCEDURE — 99214 OFFICE O/P EST MOD 30 MIN: CPT | Mod: 25

## 2023-09-07 NOTE — HISTORY OF PRESENT ILLNESS
[de-identified] : Pt almost 2 years s/p Right neck dissection for metastatic thyroid malignancy and 3 years s/p thyroidectomy feels well on Synthroid 250 mcg daily. Pt had to reschedule KAHN due to covid

## 2023-09-07 NOTE — PHYSICAL EXAM
[de-identified] : well healed scar [Midline] : located in midline position [Normal] : orientation to person, place, and time: normal

## 2023-09-07 NOTE — ASSESSMENT
[FreeTextEntry1] : Thyroid malignancy s/p thyroidectomy and right neck dissection labs in office continue Synthroid KAHN once rescheduled f/u 6 months sooner if needed

## 2023-09-08 ENCOUNTER — NON-APPOINTMENT (OUTPATIENT)
Age: 48
End: 2023-09-08

## 2023-09-08 LAB
T3 SERPL-MCNC: 115 NG/DL
T4 FREE SERPL-MCNC: 2.6 NG/DL
TSH SERPL-ACNC: 0.17 UIU/ML

## 2023-09-11 ENCOUNTER — NON-APPOINTMENT (OUTPATIENT)
Age: 48
End: 2023-09-11

## 2023-09-11 LAB
THYROGLOB AB SERPL-ACNC: 745 IU/ML
THYROGLOB SERPL-MCNC: <0.2 NG/ML

## 2023-09-18 ENCOUNTER — APPOINTMENT (OUTPATIENT)
Dept: NUCLEAR MEDICINE | Facility: HOSPITAL | Age: 48
End: 2023-09-18

## 2023-09-18 ENCOUNTER — OUTPATIENT (OUTPATIENT)
Dept: OUTPATIENT SERVICES | Facility: HOSPITAL | Age: 48
LOS: 1 days | End: 2023-09-18
Payer: MEDICAID

## 2023-09-18 DIAGNOSIS — E89.0 POSTPROCEDURAL HYPOTHYROIDISM: Chronic | ICD-10-CM

## 2023-09-18 DIAGNOSIS — Z90.710 ACQUIRED ABSENCE OF BOTH CERVIX AND UTERUS: Chronic | ICD-10-CM

## 2023-09-18 DIAGNOSIS — Z98.51 TUBAL LIGATION STATUS: Chronic | ICD-10-CM

## 2023-09-18 DIAGNOSIS — C73 MALIGNANT NEOPLASM OF THYROID GLAND: ICD-10-CM

## 2023-09-19 ENCOUNTER — APPOINTMENT (OUTPATIENT)
Dept: NUCLEAR MEDICINE | Facility: HOSPITAL | Age: 48
End: 2023-09-19

## 2023-09-20 ENCOUNTER — RESULT REVIEW (OUTPATIENT)
Age: 48
End: 2023-09-20

## 2023-09-20 ENCOUNTER — APPOINTMENT (OUTPATIENT)
Dept: NUCLEAR MEDICINE | Facility: HOSPITAL | Age: 48
End: 2023-09-20

## 2023-09-22 ENCOUNTER — APPOINTMENT (OUTPATIENT)
Dept: NUCLEAR MEDICINE | Facility: HOSPITAL | Age: 48
End: 2023-09-22

## 2023-09-22 ENCOUNTER — RESULT REVIEW (OUTPATIENT)
Age: 48
End: 2023-09-22

## 2023-09-22 PROCEDURE — 78018 THYROID MET IMAGING BODY: CPT | Mod: 26

## 2023-09-22 PROCEDURE — 78020 THYROID MET UPTAKE: CPT | Mod: 26

## 2023-09-22 PROCEDURE — 96372 THER/PROPH/DIAG INJ SC/IM: CPT

## 2023-09-22 PROCEDURE — 78020 THYROID MET UPTAKE: CPT

## 2023-09-22 PROCEDURE — A9528: CPT

## 2023-09-22 PROCEDURE — 78018 THYROID MET IMAGING BODY: CPT

## 2023-09-27 ENCOUNTER — APPOINTMENT (OUTPATIENT)
Dept: NUCLEAR MEDICINE | Facility: HOSPITAL | Age: 48
End: 2023-09-27

## 2023-10-03 ENCOUNTER — RX RENEWAL (OUTPATIENT)
Age: 48
End: 2023-10-03

## 2023-10-03 ENCOUNTER — TRANSCRIPTION ENCOUNTER (OUTPATIENT)
Age: 48
End: 2023-10-03

## 2023-10-30 ENCOUNTER — RX RENEWAL (OUTPATIENT)
Age: 48
End: 2023-10-30

## 2023-11-07 ENCOUNTER — TRANSCRIPTION ENCOUNTER (OUTPATIENT)
Age: 48
End: 2023-11-07

## 2023-11-08 ENCOUNTER — APPOINTMENT (OUTPATIENT)
Dept: CARDIOLOGY | Facility: CLINIC | Age: 48
End: 2023-11-08

## 2023-11-15 ENCOUNTER — APPOINTMENT (OUTPATIENT)
Dept: PEDIATRIC ALLERGY IMMUNOLOGY | Facility: CLINIC | Age: 48
End: 2023-11-15
Payer: MEDICAID

## 2023-11-15 ENCOUNTER — NON-APPOINTMENT (OUTPATIENT)
Age: 48
End: 2023-11-15

## 2023-11-15 VITALS
WEIGHT: 293 LBS | BODY MASS INDEX: 50.02 KG/M2 | OXYGEN SATURATION: 95 % | SYSTOLIC BLOOD PRESSURE: 132 MMHG | HEART RATE: 69 BPM | HEIGHT: 64 IN | DIASTOLIC BLOOD PRESSURE: 83 MMHG

## 2023-11-15 DIAGNOSIS — Z91.09 OTHER ALLERGY STATUS, OTHER THAN TO DRUGS AND BIOLOGICAL SUBSTANCES: ICD-10-CM

## 2023-11-15 DIAGNOSIS — J30.9 ALLERGIC RHINITIS, UNSPECIFIED: ICD-10-CM

## 2023-11-15 PROCEDURE — 99214 OFFICE O/P EST MOD 30 MIN: CPT | Mod: 25

## 2023-11-15 PROCEDURE — 94010 BREATHING CAPACITY TEST: CPT

## 2023-11-20 ENCOUNTER — APPOINTMENT (OUTPATIENT)
Dept: INTERNAL MEDICINE | Facility: CLINIC | Age: 48
End: 2023-11-20
Payer: MEDICAID

## 2023-11-20 VITALS
OXYGEN SATURATION: 95 % | BODY MASS INDEX: 50.02 KG/M2 | HEART RATE: 76 BPM | DIASTOLIC BLOOD PRESSURE: 90 MMHG | SYSTOLIC BLOOD PRESSURE: 134 MMHG | RESPIRATION RATE: 17 BRPM | HEIGHT: 64 IN | WEIGHT: 293 LBS | TEMPERATURE: 97.8 F

## 2023-11-20 DIAGNOSIS — R73.03 PREDIABETES.: ICD-10-CM

## 2023-11-20 DIAGNOSIS — Z00.00 ENCOUNTER FOR GENERAL ADULT MEDICAL EXAMINATION W/OUT ABNORMAL FINDINGS: ICD-10-CM

## 2023-11-20 DIAGNOSIS — I10 ESSENTIAL (PRIMARY) HYPERTENSION: ICD-10-CM

## 2023-11-20 DIAGNOSIS — R73.09 OTHER ABNORMAL GLUCOSE: ICD-10-CM

## 2023-11-20 DIAGNOSIS — Z23 ENCOUNTER FOR IMMUNIZATION: ICD-10-CM

## 2023-11-20 LAB
ALBUMIN SERPL ELPH-MCNC: 4.3 G/DL
ALP BLD-CCNC: 76 U/L
ALT SERPL-CCNC: 24 U/L
ANION GAP SERPL CALC-SCNC: 10 MMOL/L
AST SERPL-CCNC: 16 U/L
BILIRUB SERPL-MCNC: 0.2 MG/DL
BUN SERPL-MCNC: 16 MG/DL
CALCIUM SERPL-MCNC: 9.2 MG/DL
CHLORIDE SERPL-SCNC: 100 MMOL/L
CHOLEST SERPL-MCNC: 158 MG/DL
CO2 SERPL-SCNC: 30 MMOL/L
CREAT SERPL-MCNC: 0.49 MG/DL
EGFR: 115 ML/MIN/1.73M2
ESTIMATED AVERAGE GLUCOSE: 123 MG/DL
GLUCOSE SERPL-MCNC: 90 MG/DL
HBA1C MFR BLD HPLC: 5.9 %
HCT VFR BLD CALC: 44.6 %
HDLC SERPL-MCNC: 86 MG/DL
HGB BLD-MCNC: 13.4 G/DL
LDLC SERPL CALC-MCNC: 57 MG/DL
MCHC RBC-ENTMCNC: 26.1 PG
MCHC RBC-ENTMCNC: 30 GM/DL
MCV RBC AUTO: 86.8 FL
NONHDLC SERPL-MCNC: 72 MG/DL
PLATELET # BLD AUTO: 262 K/UL
POTASSIUM SERPL-SCNC: 4.5 MMOL/L
PROT SERPL-MCNC: 7.3 G/DL
RBC # BLD: 5.14 M/UL
RBC # FLD: 13.3 %
SODIUM SERPL-SCNC: 139 MMOL/L
TRIGL SERPL-MCNC: 76 MG/DL
WBC # FLD AUTO: 7.33 K/UL

## 2023-11-20 PROCEDURE — 90686 IIV4 VACC NO PRSV 0.5 ML IM: CPT

## 2023-11-20 PROCEDURE — 99396 PREV VISIT EST AGE 40-64: CPT | Mod: 25

## 2023-11-20 PROCEDURE — G0008: CPT

## 2023-11-20 RX ORDER — HYDROCHLOROTHIAZIDE 12.5 MG/1
12.5 TABLET ORAL
Qty: 30 | Refills: 5 | Status: DISCONTINUED | COMMUNITY
Start: 2021-04-28 | End: 2023-11-20

## 2023-11-22 ENCOUNTER — TRANSCRIPTION ENCOUNTER (OUTPATIENT)
Age: 48
End: 2023-11-22

## 2023-11-25 ENCOUNTER — TRANSCRIPTION ENCOUNTER (OUTPATIENT)
Age: 48
End: 2023-11-25

## 2023-11-29 ENCOUNTER — APPOINTMENT (OUTPATIENT)
Dept: CARDIOLOGY | Facility: CLINIC | Age: 48
End: 2023-11-29
Payer: MEDICAID

## 2023-11-29 ENCOUNTER — APPOINTMENT (OUTPATIENT)
Dept: CARDIOLOGY | Facility: CLINIC | Age: 48
End: 2023-11-29

## 2023-11-29 PROCEDURE — 93306 TTE W/DOPPLER COMPLETE: CPT

## 2023-12-18 ENCOUNTER — RX RENEWAL (OUTPATIENT)
Age: 48
End: 2023-12-18

## 2023-12-18 RX ORDER — BECLOMETHASONE DIPROPIONATE HFA 80 UG/1
80 AEROSOL, METERED RESPIRATORY (INHALATION)
Qty: 10.6 | Refills: 3 | Status: ACTIVE | COMMUNITY
Start: 2020-02-20 | End: 1900-01-01

## 2024-01-06 NOTE — PROGRESS NOTE ADULT - ASSESSMENT
42y/o POD#1 from [facility] vaginal hysterectomy, LSO, R salpingectomy for fibroids in stable condition. Patient meeting postoperative milestones. No current issues. 115

## 2024-01-16 ENCOUNTER — RX RENEWAL (OUTPATIENT)
Age: 49
End: 2024-01-16

## 2024-01-16 RX ORDER — ALBUTEROL SULFATE 90 UG/1
108 (90 BASE) INHALANT RESPIRATORY (INHALATION)
Qty: 1 | Refills: 1 | Status: ACTIVE | COMMUNITY
Start: 2020-12-21 | End: 1900-01-01

## 2024-02-12 RX ORDER — CLOTRIMAZOLE AND BETAMETHASONE DIPROPIONATE 10; .5 MG/G; MG/G
1-0.05 CREAM TOPICAL TWICE DAILY
Qty: 1 | Refills: 1 | Status: ACTIVE | COMMUNITY
Start: 2022-05-18 | End: 1900-01-01

## 2024-02-14 ENCOUNTER — TRANSCRIPTION ENCOUNTER (OUTPATIENT)
Age: 49
End: 2024-02-14

## 2024-02-15 ENCOUNTER — TRANSCRIPTION ENCOUNTER (OUTPATIENT)
Age: 49
End: 2024-02-15

## 2024-02-16 ENCOUNTER — TRANSCRIPTION ENCOUNTER (OUTPATIENT)
Age: 49
End: 2024-02-16

## 2024-03-04 ENCOUNTER — RX RENEWAL (OUTPATIENT)
Age: 49
End: 2024-03-04

## 2024-03-12 ENCOUNTER — APPOINTMENT (OUTPATIENT)
Dept: SURGERY | Facility: CLINIC | Age: 49
End: 2024-03-12

## 2024-03-19 DIAGNOSIS — R92.30 DENSE BREASTS, UNSPECIFIED: ICD-10-CM

## 2024-04-02 ENCOUNTER — TRANSCRIPTION ENCOUNTER (OUTPATIENT)
Age: 49
End: 2024-04-02

## 2024-04-02 RX ORDER — FLUTICASONE PROPIONATE 50 UG/1
50 SPRAY, METERED NASAL
Qty: 16 | Refills: 1 | Status: ACTIVE | COMMUNITY
Start: 2023-02-27 | End: 1900-01-01

## 2024-04-08 ENCOUNTER — LABORATORY RESULT (OUTPATIENT)
Age: 49
End: 2024-04-08

## 2024-04-17 ENCOUNTER — TRANSCRIPTION ENCOUNTER (OUTPATIENT)
Age: 49
End: 2024-04-17

## 2024-04-17 DIAGNOSIS — J45.909 UNSPECIFIED ASTHMA, UNCOMPLICATED: ICD-10-CM

## 2024-04-17 RX ORDER — MOMETASONE FUROATE 100 UG/1
100 AEROSOL RESPIRATORY (INHALATION) DAILY
Qty: 1 | Refills: 1 | Status: ACTIVE | COMMUNITY
Start: 2024-04-17 | End: 1900-01-01

## 2024-04-23 ENCOUNTER — TRANSCRIPTION ENCOUNTER (OUTPATIENT)
Age: 49
End: 2024-04-23

## 2024-04-23 RX ORDER — FLUTICASONE PROPIONATE 110 UG/1
110 AEROSOL, METERED RESPIRATORY (INHALATION)
Qty: 1 | Refills: 0 | Status: ACTIVE | COMMUNITY
Start: 2024-04-23 | End: 1900-01-01

## 2024-04-23 RX ORDER — BUDESONIDE 90 UG/1
90 AEROSOL, POWDER RESPIRATORY (INHALATION)
Qty: 1 | Refills: 0 | Status: ACTIVE | COMMUNITY
Start: 2024-04-23 | End: 1900-01-01

## 2024-05-17 ENCOUNTER — TRANSCRIPTION ENCOUNTER (OUTPATIENT)
Age: 49
End: 2024-05-17

## 2024-05-17 RX ORDER — LEVOTHYROXINE SODIUM 0.05 MG/1
50 TABLET ORAL
Qty: 90 | Refills: 1 | Status: ACTIVE | COMMUNITY
Start: 2021-08-06 | End: 1900-01-01

## 2024-05-17 RX ORDER — LEVOTHYROXINE SODIUM 0.2 MG/1
200 TABLET ORAL
Qty: 90 | Refills: 1 | Status: ACTIVE | COMMUNITY
Start: 2020-08-12 | End: 1900-01-01

## 2024-06-19 ENCOUNTER — APPOINTMENT (OUTPATIENT)
Dept: ULTRASOUND IMAGING | Facility: CLINIC | Age: 49
End: 2024-06-19
Payer: MEDICAID

## 2024-06-19 ENCOUNTER — RESULT REVIEW (OUTPATIENT)
Age: 49
End: 2024-06-19

## 2024-06-19 ENCOUNTER — APPOINTMENT (OUTPATIENT)
Dept: MAMMOGRAPHY | Facility: CLINIC | Age: 49
End: 2024-06-19
Payer: MEDICAID

## 2024-06-19 ENCOUNTER — OUTPATIENT (OUTPATIENT)
Dept: OUTPATIENT SERVICES | Facility: HOSPITAL | Age: 49
LOS: 1 days | End: 2024-06-19
Payer: MEDICAID

## 2024-06-19 DIAGNOSIS — R92.30 DENSE BREASTS, UNSPECIFIED: ICD-10-CM

## 2024-06-19 DIAGNOSIS — Z98.51 TUBAL LIGATION STATUS: Chronic | ICD-10-CM

## 2024-06-19 DIAGNOSIS — E89.0 POSTPROCEDURAL HYPOTHYROIDISM: Chronic | ICD-10-CM

## 2024-06-19 DIAGNOSIS — Z00.8 ENCOUNTER FOR OTHER GENERAL EXAMINATION: ICD-10-CM

## 2024-06-19 DIAGNOSIS — Z90.710 ACQUIRED ABSENCE OF BOTH CERVIX AND UTERUS: Chronic | ICD-10-CM

## 2024-06-19 PROCEDURE — 77067 SCR MAMMO BI INCL CAD: CPT

## 2024-06-19 PROCEDURE — 77063 BREAST TOMOSYNTHESIS BI: CPT | Mod: 26

## 2024-06-19 PROCEDURE — 76641 ULTRASOUND BREAST COMPLETE: CPT

## 2024-06-19 PROCEDURE — 77063 BREAST TOMOSYNTHESIS BI: CPT

## 2024-06-19 PROCEDURE — 77067 SCR MAMMO BI INCL CAD: CPT | Mod: 26

## 2024-06-19 PROCEDURE — 76641 ULTRASOUND BREAST COMPLETE: CPT | Mod: 26,50

## 2024-06-24 ENCOUNTER — RX RENEWAL (OUTPATIENT)
Age: 49
End: 2024-06-24

## 2024-06-24 RX ORDER — VALSARTAN 320 MG/1
320 TABLET, COATED ORAL
Qty: 90 | Refills: 0 | Status: ACTIVE | COMMUNITY
Start: 2021-09-08 | End: 1900-01-01

## 2024-07-25 ENCOUNTER — RX RENEWAL (OUTPATIENT)
Age: 49
End: 2024-07-25

## 2024-08-06 NOTE — HISTORY OF PRESENT ILLNESS
Keep wound clean daily; apply antibiotic ointment and dressing changes.  Return to ED if noticed erythema, red streaking, or abnormal discharge.    Suture removal 6 to 7 days  Use a breathing apparatus  Return to ED experienced any chest pain or shortness of breath     [Currently Active] : currently active [Men] : men [No] : No [FreeTextEntry1] : Pt is a 44 y/o  s/p total hysterectomy in 2018 for benign disease, presenting for annual well visit. Pt overall feeling well. Denies vaginal bleeding, n/v/d, UTI sx, vasomotor sx. Pt does express weight concerns s/p thyroidectomy for papillary thyroid CA, feeling she has gained more weight despite dietary and activity changes, getting TSH checked today. Currently taking 175 mcg synthroid. She is also considering bariatric weight loss surgery. Pt takes amlodipine 5 mg for HTN, forgot to take it today. She is sexually active with one male partner. Denies fever, chills, chest pain, SOB, abdominal pain, vaginal discharge or irritation, dyspareunia, N/V/D, constipation.

## 2024-08-29 ENCOUNTER — TRANSCRIPTION ENCOUNTER (OUTPATIENT)
Age: 49
End: 2024-08-29

## 2024-11-04 ENCOUNTER — TRANSCRIPTION ENCOUNTER (OUTPATIENT)
Age: 49
End: 2024-11-04

## 2024-11-27 ENCOUNTER — TRANSCRIPTION ENCOUNTER (OUTPATIENT)
Age: 49
End: 2024-11-27

## 2024-12-18 ENCOUNTER — NON-APPOINTMENT (OUTPATIENT)
Age: 49
End: 2024-12-18

## 2024-12-19 ENCOUNTER — APPOINTMENT (OUTPATIENT)
Dept: INTERNAL MEDICINE | Facility: CLINIC | Age: 49
End: 2024-12-19
Payer: MEDICAID

## 2024-12-19 VITALS
TEMPERATURE: 98 F | RESPIRATION RATE: 17 BRPM | DIASTOLIC BLOOD PRESSURE: 92 MMHG | SYSTOLIC BLOOD PRESSURE: 148 MMHG | OXYGEN SATURATION: 95 % | WEIGHT: 293 LBS | HEART RATE: 79 BPM | HEIGHT: 64 IN | BODY MASS INDEX: 50.02 KG/M2

## 2024-12-19 DIAGNOSIS — Z00.00 ENCOUNTER FOR GENERAL ADULT MEDICAL EXAMINATION W/OUT ABNORMAL FINDINGS: ICD-10-CM

## 2024-12-19 DIAGNOSIS — I10 ESSENTIAL (PRIMARY) HYPERTENSION: ICD-10-CM

## 2024-12-19 DIAGNOSIS — R73.09 OTHER ABNORMAL GLUCOSE: ICD-10-CM

## 2024-12-19 PROCEDURE — 99396 PREV VISIT EST AGE 40-64: CPT | Mod: 25

## 2024-12-19 PROCEDURE — 90656 IIV3 VACC NO PRSV 0.5 ML IM: CPT

## 2024-12-19 PROCEDURE — G0008: CPT

## 2024-12-23 LAB
ALBUMIN SERPL ELPH-MCNC: 4.4 G/DL
ALP BLD-CCNC: 82 U/L
ALT SERPL-CCNC: 19 U/L
ANION GAP SERPL CALC-SCNC: 11 MMOL/L
AST SERPL-CCNC: 17 U/L
BILIRUB SERPL-MCNC: 0.3 MG/DL
BUN SERPL-MCNC: 14 MG/DL
CALCIUM SERPL-MCNC: 9.7 MG/DL
CHLORIDE SERPL-SCNC: 99 MMOL/L
CHOLEST SERPL-MCNC: 169 MG/DL
CO2 SERPL-SCNC: 32 MMOL/L
CREAT SERPL-MCNC: 0.49 MG/DL
EGFR: 114 ML/MIN/1.73M2
ESTIMATED AVERAGE GLUCOSE: 131 MG/DL
GLUCOSE SERPL-MCNC: 93 MG/DL
HBA1C MFR BLD HPLC: 6.2 %
HCT VFR BLD CALC: 48.4 %
HDLC SERPL-MCNC: 92 MG/DL
HGB BLD-MCNC: 13.8 G/DL
LDLC SERPL CALC-MCNC: 64 MG/DL
MCHC RBC-ENTMCNC: 23.7 PG
MCHC RBC-ENTMCNC: 28.5 G/DL
MCV RBC AUTO: 83 FL
NONHDLC SERPL-MCNC: 76 MG/DL
PLATELET # BLD AUTO: 263 K/UL
POTASSIUM SERPL-SCNC: 4.4 MMOL/L
PROT SERPL-MCNC: 7.8 G/DL
RBC # BLD: 5.83 M/UL
RBC # FLD: 15.4 %
SODIUM SERPL-SCNC: 141 MMOL/L
TRIGL SERPL-MCNC: 65 MG/DL
WBC # FLD AUTO: 6.86 K/UL

## 2024-12-26 ENCOUNTER — RX RENEWAL (OUTPATIENT)
Age: 49
End: 2024-12-26

## 2024-12-30 ENCOUNTER — TRANSCRIPTION ENCOUNTER (OUTPATIENT)
Age: 49
End: 2024-12-30

## 2025-01-13 ENCOUNTER — NON-APPOINTMENT (OUTPATIENT)
Age: 50
End: 2025-01-13

## 2025-01-13 ENCOUNTER — APPOINTMENT (OUTPATIENT)
Dept: PEDIATRIC ALLERGY IMMUNOLOGY | Facility: CLINIC | Age: 50
End: 2025-01-13
Payer: MEDICAID

## 2025-01-13 VITALS
HEART RATE: 57 BPM | DIASTOLIC BLOOD PRESSURE: 98 MMHG | SYSTOLIC BLOOD PRESSURE: 156 MMHG | WEIGHT: 235 LBS | BODY MASS INDEX: 40.12 KG/M2 | HEIGHT: 64 IN | OXYGEN SATURATION: 96 %

## 2025-01-13 DIAGNOSIS — J45.909 UNSPECIFIED ASTHMA, UNCOMPLICATED: ICD-10-CM

## 2025-01-13 DIAGNOSIS — J30.9 ALLERGIC RHINITIS, UNSPECIFIED: ICD-10-CM

## 2025-01-13 PROCEDURE — 99204 OFFICE O/P NEW MOD 45 MIN: CPT | Mod: 25

## 2025-01-13 PROCEDURE — 94010 BREATHING CAPACITY TEST: CPT

## 2025-01-15 ENCOUNTER — NON-APPOINTMENT (OUTPATIENT)
Age: 50
End: 2025-01-15

## 2025-01-15 ENCOUNTER — APPOINTMENT (OUTPATIENT)
Dept: CARDIOLOGY | Facility: CLINIC | Age: 50
End: 2025-01-15
Payer: MEDICAID

## 2025-01-15 VITALS
SYSTOLIC BLOOD PRESSURE: 126 MMHG | WEIGHT: 235 LBS | HEART RATE: 60 BPM | HEIGHT: 64 IN | OXYGEN SATURATION: 95 % | DIASTOLIC BLOOD PRESSURE: 90 MMHG | BODY MASS INDEX: 40.12 KG/M2

## 2025-01-15 VITALS — SYSTOLIC BLOOD PRESSURE: 120 MMHG | DIASTOLIC BLOOD PRESSURE: 80 MMHG

## 2025-01-15 PROCEDURE — 99214 OFFICE O/P EST MOD 30 MIN: CPT | Mod: 25

## 2025-01-15 PROCEDURE — 93000 ELECTROCARDIOGRAM COMPLETE: CPT

## 2025-01-17 ENCOUNTER — TRANSCRIPTION ENCOUNTER (OUTPATIENT)
Age: 50
End: 2025-01-17

## 2025-01-22 ENCOUNTER — APPOINTMENT (OUTPATIENT)
Dept: CARDIOLOGY | Facility: CLINIC | Age: 50
End: 2025-01-22

## 2025-01-23 ENCOUNTER — TRANSCRIPTION ENCOUNTER (OUTPATIENT)
Age: 50
End: 2025-01-23

## 2025-01-24 ENCOUNTER — TRANSCRIPTION ENCOUNTER (OUTPATIENT)
Age: 50
End: 2025-01-24

## 2025-02-04 ENCOUNTER — APPOINTMENT (OUTPATIENT)
Dept: ENDOCRINOLOGY | Facility: CLINIC | Age: 50
End: 2025-02-04

## 2025-02-04 DIAGNOSIS — E66.01 MORBID (SEVERE) OBESITY DUE TO EXCESS CALORIES: ICD-10-CM

## 2025-02-04 DIAGNOSIS — C73 MALIGNANT NEOPLASM OF THYROID GLAND: ICD-10-CM

## 2025-02-04 DIAGNOSIS — R73.09 OTHER ABNORMAL GLUCOSE: ICD-10-CM

## 2025-02-04 PROCEDURE — 99214 OFFICE O/P EST MOD 30 MIN: CPT | Mod: 95

## 2025-02-04 RX ORDER — TIRZEPATIDE 2.5 MG/.5ML
2.5 INJECTION, SOLUTION SUBCUTANEOUS
Qty: 1 | Refills: 0 | Status: ACTIVE | COMMUNITY
Start: 2025-02-04 | End: 1900-01-01

## 2025-02-06 ENCOUNTER — TRANSCRIPTION ENCOUNTER (OUTPATIENT)
Age: 50
End: 2025-02-06

## 2025-02-11 ENCOUNTER — TRANSCRIPTION ENCOUNTER (OUTPATIENT)
Age: 50
End: 2025-02-11

## 2025-02-11 ENCOUNTER — LABORATORY RESULT (OUTPATIENT)
Age: 50
End: 2025-02-11

## 2025-02-11 ENCOUNTER — NON-APPOINTMENT (OUTPATIENT)
Age: 50
End: 2025-02-11

## 2025-02-11 ENCOUNTER — APPOINTMENT (OUTPATIENT)
Dept: OBGYN | Facility: CLINIC | Age: 50
End: 2025-02-11
Payer: MEDICAID

## 2025-02-11 ENCOUNTER — OUTPATIENT (OUTPATIENT)
Dept: OUTPATIENT SERVICES | Facility: HOSPITAL | Age: 50
LOS: 1 days | End: 2025-02-11
Payer: MEDICAID

## 2025-02-11 VITALS — BODY MASS INDEX: 40.34 KG/M2 | WEIGHT: 235 LBS | DIASTOLIC BLOOD PRESSURE: 90 MMHG | SYSTOLIC BLOOD PRESSURE: 150 MMHG

## 2025-02-11 DIAGNOSIS — N94.10 UNSPECIFIED DYSPAREUNIA: ICD-10-CM

## 2025-02-11 DIAGNOSIS — N95.2 POSTMENOPAUSAL ATROPHIC VAGINITIS: ICD-10-CM

## 2025-02-11 DIAGNOSIS — Z98.51 TUBAL LIGATION STATUS: Chronic | ICD-10-CM

## 2025-02-11 DIAGNOSIS — N76.0 ACUTE VAGINITIS: ICD-10-CM

## 2025-02-11 DIAGNOSIS — Z90.710 ACQUIRED ABSENCE OF BOTH CERVIX AND UTERUS: Chronic | ICD-10-CM

## 2025-02-11 DIAGNOSIS — E89.0 POSTPROCEDURAL HYPOTHYROIDISM: Chronic | ICD-10-CM

## 2025-02-11 DIAGNOSIS — Z01.419 ENCOUNTER FOR GYNECOLOGICAL EXAMINATION (GENERAL) (ROUTINE) W/OUT ABNORMAL FINDINGS: ICD-10-CM

## 2025-02-11 PROBLEM — N89.8 VAGINAL DRYNESS: Status: ACTIVE | Noted: 2025-02-11

## 2025-02-11 PROCEDURE — 87591 N.GONORRHOEAE DNA AMP PROB: CPT

## 2025-02-11 PROCEDURE — 87481 CANDIDA DNA AMP PROBE: CPT

## 2025-02-11 PROCEDURE — 87491 CHLMYD TRACH DNA AMP PROBE: CPT

## 2025-02-11 PROCEDURE — 87661 TRICHOMONAS VAGINALIS AMPLIF: CPT

## 2025-02-11 PROCEDURE — G0444 DEPRESSION SCREEN ANNUAL: CPT | Mod: 59

## 2025-02-11 PROCEDURE — G0463: CPT

## 2025-02-11 PROCEDURE — 99213 OFFICE O/P EST LOW 20 MIN: CPT | Mod: 25

## 2025-02-11 PROCEDURE — 81513 NFCT DS BV RNA VAG FLU ALG: CPT

## 2025-02-11 RX ORDER — ESTRADIOL 0.1 MG/G
0.1 CREAM VAGINAL
Qty: 1 | Refills: 0 | Status: ACTIVE | COMMUNITY
Start: 2025-02-11 | End: 1900-01-01

## 2025-02-11 RX ORDER — GLYCERIN/MIN OIL/POLYCARBOPHIL
GEL WITH APPLICATOR (GRAM) VAGINAL
Qty: 1 | Refills: 5 | Status: ACTIVE | COMMUNITY
Start: 2025-02-11 | End: 1900-01-01

## 2025-02-12 ENCOUNTER — TRANSCRIPTION ENCOUNTER (OUTPATIENT)
Age: 50
End: 2025-02-12

## 2025-02-12 DIAGNOSIS — B37.31 ACUTE CANDIDIASIS OF VULVA AND VAGINA: ICD-10-CM

## 2025-02-12 LAB
BV BACTERIA RRNA VAG QL NAA+PROBE: SIGNIFICANT CHANGE UP
C GLABRATA RNA VAG QL NAA+PROBE: SIGNIFICANT CHANGE UP
C TRACH RRNA SPEC QL NAA+PROBE: SIGNIFICANT CHANGE UP
CANDIDA RRNA VAG QL PROBE: DETECTED
N GONORRHOEA RRNA SPEC QL NAA+PROBE: SIGNIFICANT CHANGE UP
T VAGINALIS RRNA SPEC QL NAA+PROBE: SIGNIFICANT CHANGE UP
T4 FREE SERPL-MCNC: 1.9 NG/DL
THYROGLOB AB SERPL-ACNC: >4000 IU/ML
THYROGLOB SERPL-MCNC: <0.1 NG/ML
TSH SERPL-ACNC: 0.69 UIU/ML

## 2025-02-12 RX ORDER — FLUCONAZOLE 150 MG/1
150 TABLET ORAL
Qty: 2 | Refills: 0 | Status: ACTIVE | COMMUNITY
Start: 2025-02-12 | End: 1900-01-01

## 2025-02-12 RX ORDER — TERCONAZOLE 8 MG/G
0.8 CREAM VAGINAL
Qty: 1 | Refills: 0 | Status: ACTIVE | COMMUNITY
Start: 2025-02-12 | End: 1900-01-01

## 2025-02-13 ENCOUNTER — TRANSCRIPTION ENCOUNTER (OUTPATIENT)
Age: 50
End: 2025-02-13

## 2025-02-14 ENCOUNTER — TRANSCRIPTION ENCOUNTER (OUTPATIENT)
Age: 50
End: 2025-02-14

## 2025-02-17 ENCOUNTER — OUTPATIENT (OUTPATIENT)
Dept: OUTPATIENT SERVICES | Facility: HOSPITAL | Age: 50
LOS: 1 days | End: 2025-02-17
Payer: MEDICAID

## 2025-02-17 ENCOUNTER — APPOINTMENT (OUTPATIENT)
Dept: ULTRASOUND IMAGING | Facility: CLINIC | Age: 50
End: 2025-02-17
Payer: MEDICAID

## 2025-02-17 DIAGNOSIS — Z90.710 ACQUIRED ABSENCE OF BOTH CERVIX AND UTERUS: Chronic | ICD-10-CM

## 2025-02-17 DIAGNOSIS — Z98.51 TUBAL LIGATION STATUS: Chronic | ICD-10-CM

## 2025-02-17 DIAGNOSIS — Z00.8 ENCOUNTER FOR OTHER GENERAL EXAMINATION: ICD-10-CM

## 2025-02-17 DIAGNOSIS — E89.0 POSTPROCEDURAL HYPOTHYROIDISM: Chronic | ICD-10-CM

## 2025-02-17 PROCEDURE — 76536 US EXAM OF HEAD AND NECK: CPT | Mod: 26

## 2025-02-17 PROCEDURE — 76536 US EXAM OF HEAD AND NECK: CPT

## 2025-02-18 ENCOUNTER — TRANSCRIPTION ENCOUNTER (OUTPATIENT)
Age: 50
End: 2025-02-18

## 2025-02-18 ENCOUNTER — NON-APPOINTMENT (OUTPATIENT)
Age: 50
End: 2025-02-18

## 2025-02-18 ENCOUNTER — APPOINTMENT (OUTPATIENT)
Dept: PEDIATRIC ALLERGY IMMUNOLOGY | Facility: CLINIC | Age: 50
End: 2025-02-18
Payer: MEDICAID

## 2025-02-18 VITALS
SYSTOLIC BLOOD PRESSURE: 154 MMHG | OXYGEN SATURATION: 100 % | BODY MASS INDEX: 40.12 KG/M2 | HEART RATE: 71 BPM | DIASTOLIC BLOOD PRESSURE: 91 MMHG | WEIGHT: 235 LBS | HEIGHT: 64 IN

## 2025-02-18 DIAGNOSIS — N89.8 OTHER SPECIFIED NONINFLAMMATORY DISORDERS OF VAGINA: ICD-10-CM

## 2025-02-18 DIAGNOSIS — J30.9 ALLERGIC RHINITIS, UNSPECIFIED: ICD-10-CM

## 2025-02-18 DIAGNOSIS — J45.909 UNSPECIFIED ASTHMA, UNCOMPLICATED: ICD-10-CM

## 2025-02-18 DIAGNOSIS — R94.2 ABNORMAL RESULTS OF PULMONARY FUNCTION STUDIES: ICD-10-CM

## 2025-02-18 PROCEDURE — 94010 BREATHING CAPACITY TEST: CPT

## 2025-02-18 PROCEDURE — 99214 OFFICE O/P EST MOD 30 MIN: CPT | Mod: 25

## 2025-02-18 RX ORDER — GLYCERIN/MIN OIL/POLYCARBOPHIL
GEL WITH APPLICATOR (GRAM) VAGINAL
Qty: 1 | Refills: 2 | Status: ACTIVE | COMMUNITY
Start: 2025-02-18 | End: 1900-01-01

## 2025-02-20 ENCOUNTER — TRANSCRIPTION ENCOUNTER (OUTPATIENT)
Age: 50
End: 2025-02-20

## 2025-02-20 ENCOUNTER — OUTPATIENT (OUTPATIENT)
Dept: OUTPATIENT SERVICES | Facility: HOSPITAL | Age: 50
LOS: 1 days | End: 2025-02-20
Payer: MEDICAID

## 2025-02-20 ENCOUNTER — APPOINTMENT (OUTPATIENT)
Dept: RADIOLOGY | Facility: CLINIC | Age: 50
End: 2025-02-20
Payer: MEDICAID

## 2025-02-20 DIAGNOSIS — R94.2 ABNORMAL RESULTS OF PULMONARY FUNCTION STUDIES: ICD-10-CM

## 2025-02-20 DIAGNOSIS — Z90.710 ACQUIRED ABSENCE OF BOTH CERVIX AND UTERUS: Chronic | ICD-10-CM

## 2025-02-20 DIAGNOSIS — N95.2 POSTMENOPAUSAL ATROPHIC VAGINITIS: ICD-10-CM

## 2025-02-20 DIAGNOSIS — N94.10 UNSPECIFIED DYSPAREUNIA: ICD-10-CM

## 2025-02-20 DIAGNOSIS — E89.0 POSTPROCEDURAL HYPOTHYROIDISM: Chronic | ICD-10-CM

## 2025-02-20 DIAGNOSIS — Z98.51 TUBAL LIGATION STATUS: Chronic | ICD-10-CM

## 2025-02-20 DIAGNOSIS — N89.8 OTHER SPECIFIED NONINFLAMMATORY DISORDERS OF VAGINA: ICD-10-CM

## 2025-02-20 PROCEDURE — 71046 X-RAY EXAM CHEST 2 VIEWS: CPT

## 2025-02-20 PROCEDURE — 71046 X-RAY EXAM CHEST 2 VIEWS: CPT | Mod: 26

## 2025-02-25 ENCOUNTER — TRANSCRIPTION ENCOUNTER (OUTPATIENT)
Age: 50
End: 2025-02-25

## 2025-02-26 ENCOUNTER — TRANSCRIPTION ENCOUNTER (OUTPATIENT)
Age: 50
End: 2025-02-26

## 2025-02-27 ENCOUNTER — TRANSCRIPTION ENCOUNTER (OUTPATIENT)
Age: 50
End: 2025-02-27

## 2025-03-03 ENCOUNTER — NON-APPOINTMENT (OUTPATIENT)
Age: 50
End: 2025-03-03

## 2025-03-12 NOTE — PRE-OP CHECKLIST - TEMPERATURE IN CELSIUS (DEGREES C)
Addressed in a result note.  
Patient calling back for results. She said its okay to leave results on her voicemail since she will be at work.  
36.7

## 2025-04-14 ENCOUNTER — APPOINTMENT (OUTPATIENT)
Dept: PULMONOLOGY | Facility: CLINIC | Age: 50
End: 2025-04-14

## 2025-04-29 ENCOUNTER — APPOINTMENT (OUTPATIENT)
Dept: PULMONOLOGY | Facility: CLINIC | Age: 50
End: 2025-04-29
Payer: MEDICAID

## 2025-04-29 VITALS
OXYGEN SATURATION: 96 % | TEMPERATURE: 98.1 F | WEIGHT: 293 LBS | DIASTOLIC BLOOD PRESSURE: 94 MMHG | SYSTOLIC BLOOD PRESSURE: 164 MMHG | HEIGHT: 64 IN | BODY MASS INDEX: 50.02 KG/M2 | HEART RATE: 74 BPM

## 2025-04-29 DIAGNOSIS — R93.89 ABNORMAL FINDINGS ON DIAGNOSTIC IMAGING OF OTHER SPECIFIED BODY STRUCTURES: ICD-10-CM

## 2025-04-29 DIAGNOSIS — Z85.850 PERSONAL HISTORY OF MALIGNANT NEOPLASM OF THYROID: ICD-10-CM

## 2025-04-29 DIAGNOSIS — J45.31 MILD PERSISTENT ASTHMA WITH (ACUTE) EXACERBATION: ICD-10-CM

## 2025-04-29 LAB — HEMOGLOBIN: NORMAL

## 2025-04-29 PROCEDURE — 94729 DIFFUSING CAPACITY: CPT

## 2025-04-29 PROCEDURE — 94727 GAS DIL/WSHOT DETER LNG VOL: CPT

## 2025-04-29 PROCEDURE — ZZZZZ: CPT

## 2025-04-29 PROCEDURE — 95012 NITRIC OXIDE EXP GAS DETER: CPT

## 2025-04-29 PROCEDURE — 85018 HEMOGLOBIN: CPT | Mod: QW

## 2025-04-29 PROCEDURE — 94010 BREATHING CAPACITY TEST: CPT

## 2025-04-29 PROCEDURE — 99204 OFFICE O/P NEW MOD 45 MIN: CPT | Mod: 25

## 2025-05-01 ENCOUNTER — RX RENEWAL (OUTPATIENT)
Age: 50
End: 2025-05-01

## 2025-05-02 RX ORDER — BUDESONIDE AND FORMOTEROL FUMARATE DIHYDRATE 160; 4.5 UG/1; UG/1
160-4.5 AEROSOL RESPIRATORY (INHALATION) TWICE DAILY
Qty: 1 | Refills: 0 | Status: ACTIVE | COMMUNITY
Start: 2025-05-02 | End: 1900-01-01

## 2025-05-27 ENCOUNTER — APPOINTMENT (OUTPATIENT)
Dept: PULMONOLOGY | Facility: CLINIC | Age: 50
End: 2025-05-27
Payer: MEDICAID

## 2025-05-27 VITALS
DIASTOLIC BLOOD PRESSURE: 87 MMHG | SYSTOLIC BLOOD PRESSURE: 151 MMHG | HEIGHT: 64 IN | HEART RATE: 72 BPM | BODY MASS INDEX: 50.02 KG/M2 | TEMPERATURE: 98 F | OXYGEN SATURATION: 96 % | WEIGHT: 293 LBS

## 2025-05-27 DIAGNOSIS — R93.89 ABNORMAL FINDINGS ON DIAGNOSTIC IMAGING OF OTHER SPECIFIED BODY STRUCTURES: ICD-10-CM

## 2025-05-27 DIAGNOSIS — J45.909 UNSPECIFIED ASTHMA, UNCOMPLICATED: ICD-10-CM

## 2025-05-27 PROCEDURE — 94010 BREATHING CAPACITY TEST: CPT

## 2025-05-27 PROCEDURE — 99214 OFFICE O/P EST MOD 30 MIN: CPT | Mod: 25

## 2025-05-28 ENCOUNTER — RX RENEWAL (OUTPATIENT)
Age: 50
End: 2025-05-28

## 2025-05-28 ENCOUNTER — TRANSCRIPTION ENCOUNTER (OUTPATIENT)
Age: 50
End: 2025-05-28

## 2025-06-03 ENCOUNTER — APPOINTMENT (OUTPATIENT)
Dept: ENDOCRINOLOGY | Facility: CLINIC | Age: 50
End: 2025-06-03

## 2025-06-20 ENCOUNTER — OUTPATIENT (OUTPATIENT)
Dept: OUTPATIENT SERVICES | Facility: HOSPITAL | Age: 50
LOS: 1 days | End: 2025-06-20
Payer: MEDICAID

## 2025-06-20 ENCOUNTER — APPOINTMENT (OUTPATIENT)
Dept: CT IMAGING | Facility: CLINIC | Age: 50
End: 2025-06-20

## 2025-06-20 DIAGNOSIS — E89.0 POSTPROCEDURAL HYPOTHYROIDISM: Chronic | ICD-10-CM

## 2025-06-20 DIAGNOSIS — J45.909 UNSPECIFIED ASTHMA, UNCOMPLICATED: ICD-10-CM

## 2025-06-20 DIAGNOSIS — Z00.8 ENCOUNTER FOR OTHER GENERAL EXAMINATION: ICD-10-CM

## 2025-06-20 DIAGNOSIS — Z90.710 ACQUIRED ABSENCE OF BOTH CERVIX AND UTERUS: Chronic | ICD-10-CM

## 2025-06-20 DIAGNOSIS — Z98.51 TUBAL LIGATION STATUS: Chronic | ICD-10-CM

## 2025-06-20 PROCEDURE — 71250 CT THORAX DX C-: CPT

## 2025-06-20 PROCEDURE — 71250 CT THORAX DX C-: CPT | Mod: 26

## 2025-06-24 ENCOUNTER — LABORATORY RESULT (OUTPATIENT)
Age: 50
End: 2025-06-24

## 2025-06-24 ENCOUNTER — APPOINTMENT (OUTPATIENT)
Dept: ENDOCRINOLOGY | Facility: CLINIC | Age: 50
End: 2025-06-24
Payer: MEDICAID

## 2025-06-24 VITALS
DIASTOLIC BLOOD PRESSURE: 84 MMHG | SYSTOLIC BLOOD PRESSURE: 152 MMHG | HEIGHT: 64 IN | OXYGEN SATURATION: 96 % | HEART RATE: 83 BPM

## 2025-06-24 LAB
ESTIMATED AVERAGE GLUCOSE: 128 MG/DL
HBA1C MFR BLD HPLC: 6.1 %
THYROGLOB AB SERPL-ACNC: >4000 IU/ML
THYROGLOB SERPL-MCNC: <0.1 NG/ML
TSH SERPL-ACNC: 0.24 UIU/ML

## 2025-06-24 PROCEDURE — 99214 OFFICE O/P EST MOD 30 MIN: CPT

## 2025-06-24 PROCEDURE — G2211 COMPLEX E/M VISIT ADD ON: CPT | Mod: NC

## 2025-06-24 RX ORDER — TIRZEPATIDE 2.5 MG/.5ML
2.5 INJECTION, SOLUTION SUBCUTANEOUS
Qty: 1 | Refills: 0 | Status: ACTIVE | COMMUNITY
Start: 2025-06-24 | End: 1900-01-01

## 2025-06-27 ENCOUNTER — NON-APPOINTMENT (OUTPATIENT)
Age: 50
End: 2025-06-27

## 2025-07-01 LAB
CLINICAL BIOCHEMIST REVIEW: NORMAL
THYROGLOB SERPL-MCNC: <0.2 NG/ML

## 2025-07-16 ENCOUNTER — APPOINTMENT (OUTPATIENT)
Dept: CARDIOLOGY | Facility: CLINIC | Age: 50
End: 2025-07-16

## 2025-08-15 ENCOUNTER — TRANSCRIPTION ENCOUNTER (OUTPATIENT)
Age: 50
End: 2025-08-15

## 2025-08-19 ENCOUNTER — TRANSCRIPTION ENCOUNTER (OUTPATIENT)
Age: 50
End: 2025-08-19

## 2025-09-17 ENCOUNTER — APPOINTMENT (OUTPATIENT)
Dept: CARDIOLOGY | Facility: CLINIC | Age: 50
End: 2025-09-17